# Patient Record
Sex: FEMALE | NOT HISPANIC OR LATINO | Employment: UNEMPLOYED | URBAN - METROPOLITAN AREA
[De-identification: names, ages, dates, MRNs, and addresses within clinical notes are randomized per-mention and may not be internally consistent; named-entity substitution may affect disease eponyms.]

---

## 2017-02-27 ENCOUNTER — HOSPITAL ENCOUNTER (OUTPATIENT)
Dept: ULTRASOUND IMAGING | Facility: CLINIC | Age: 27
Discharge: HOME | End: 2017-02-27
Attending: OBSTETRICS & GYNECOLOGY | Admitting: OBSTETRICS & GYNECOLOGY
Payer: MEDICAID

## 2017-02-27 ENCOUNTER — PRENATAL OFFICE VISIT (OUTPATIENT)
Dept: OBGYN | Facility: CLINIC | Age: 27
End: 2017-02-27
Payer: MEDICAID

## 2017-02-27 VITALS
HEIGHT: 61 IN | SYSTOLIC BLOOD PRESSURE: 93 MMHG | TEMPERATURE: 97.9 F | DIASTOLIC BLOOD PRESSURE: 59 MMHG | WEIGHT: 123 LBS | HEART RATE: 83 BPM | BODY MASS INDEX: 23.22 KG/M2

## 2017-02-27 DIAGNOSIS — Z34.02 PRENATAL CARE, FIRST PREGNANCY, SECOND TRIMESTER: Primary | ICD-10-CM

## 2017-02-27 DIAGNOSIS — Z34.02 PRENATAL CARE, FIRST PREGNANCY, SECOND TRIMESTER: ICD-10-CM

## 2017-02-27 PROBLEM — Z60.3 LANGUAGE BARRIER: Status: ACTIVE | Noted: 2017-02-27

## 2017-02-27 PROBLEM — Z75.8 LANGUAGE BARRIER: Status: ACTIVE | Noted: 2017-02-27

## 2017-02-27 PROBLEM — Z34.00 PRENATAL CARE, FIRST PREGNANCY: Status: ACTIVE | Noted: 2017-02-27

## 2017-02-27 LAB
ABO + RH BLD: NORMAL
ABO + RH BLD: NORMAL
ALBUMIN UR-MCNC: NEGATIVE MG/DL
AMORPH CRY #/AREA URNS HPF: ABNORMAL /HPF
APPEARANCE UR: NORMAL
BILIRUB UR QL STRIP: NEGATIVE
BLD GP AB SCN SERPL QL: NORMAL
BLOOD BANK CMNT PATIENT-IMP: NORMAL
COLOR UR AUTO: YELLOW
ERYTHROCYTE [DISTWIDTH] IN BLOOD BY AUTOMATED COUNT: 13.1 % (ref 10–15)
GLUCOSE UR STRIP-MCNC: NEGATIVE MG/DL
HCT VFR BLD AUTO: 36.8 % (ref 35–47)
HGB BLD-MCNC: 12.4 G/DL (ref 11.7–15.7)
HGB UR QL STRIP: NEGATIVE
KETONES UR STRIP-MCNC: NEGATIVE MG/DL
LEUKOCYTE ESTERASE UR QL STRIP: NEGATIVE
MCH RBC QN AUTO: 29.9 PG (ref 26.5–33)
MCHC RBC AUTO-ENTMCNC: 33.7 G/DL (ref 31.5–36.5)
MCV RBC AUTO: 89 FL (ref 78–100)
NITRATE UR QL: NEGATIVE
NON-SQ EPI CELLS #/AREA URNS LPF: ABNORMAL /LPF
PH UR STRIP: 7 PH (ref 5–7)
PLATELET # BLD AUTO: 214 10E9/L (ref 150–450)
RBC # BLD AUTO: 4.15 10E12/L (ref 3.8–5.2)
RBC #/AREA URNS AUTO: ABNORMAL /HPF (ref 0–2)
SP GR UR STRIP: 1.02 (ref 1–1.03)
SPECIMEN EXP DATE BLD: NORMAL
URN SPEC COLLECT METH UR: NORMAL
UROBILINOGEN UR STRIP-ACNC: 0.2 EU/DL (ref 0.2–1)
WBC # BLD AUTO: 8.1 10E9/L (ref 4–11)
WBC #/AREA URNS AUTO: ABNORMAL /HPF (ref 0–2)

## 2017-02-27 PROCEDURE — 87389 HIV-1 AG W/HIV-1&-2 AB AG IA: CPT | Performed by: OBSTETRICS & GYNECOLOGY

## 2017-02-27 PROCEDURE — 86780 TREPONEMA PALLIDUM: CPT | Performed by: OBSTETRICS & GYNECOLOGY

## 2017-02-27 PROCEDURE — 86900 BLOOD TYPING SEROLOGIC ABO: CPT | Performed by: OBSTETRICS & GYNECOLOGY

## 2017-02-27 PROCEDURE — 76805 OB US >/= 14 WKS SNGL FETUS: CPT

## 2017-02-27 PROCEDURE — 36415 COLL VENOUS BLD VENIPUNCTURE: CPT | Performed by: OBSTETRICS & GYNECOLOGY

## 2017-02-27 PROCEDURE — 87340 HEPATITIS B SURFACE AG IA: CPT | Performed by: OBSTETRICS & GYNECOLOGY

## 2017-02-27 PROCEDURE — 85027 COMPLETE CBC AUTOMATED: CPT | Performed by: OBSTETRICS & GYNECOLOGY

## 2017-02-27 PROCEDURE — 81001 URINALYSIS AUTO W/SCOPE: CPT | Performed by: OBSTETRICS & GYNECOLOGY

## 2017-02-27 PROCEDURE — 99202 OFFICE O/P NEW SF 15 MIN: CPT | Performed by: OBSTETRICS & GYNECOLOGY

## 2017-02-27 PROCEDURE — 86901 BLOOD TYPING SEROLOGIC RH(D): CPT | Performed by: OBSTETRICS & GYNECOLOGY

## 2017-02-27 PROCEDURE — 87491 CHLMYD TRACH DNA AMP PROBE: CPT | Performed by: OBSTETRICS & GYNECOLOGY

## 2017-02-27 PROCEDURE — 87591 N.GONORRHOEAE DNA AMP PROB: CPT | Performed by: OBSTETRICS & GYNECOLOGY

## 2017-02-27 PROCEDURE — 87086 URINE CULTURE/COLONY COUNT: CPT | Performed by: OBSTETRICS & GYNECOLOGY

## 2017-02-27 PROCEDURE — T1013 SIGN LANG/ORAL INTERPRETER: HCPCS | Mod: U3 | Performed by: OBSTETRICS & GYNECOLOGY

## 2017-02-27 PROCEDURE — 86850 RBC ANTIBODY SCREEN: CPT | Performed by: OBSTETRICS & GYNECOLOGY

## 2017-02-27 PROCEDURE — 86762 RUBELLA ANTIBODY: CPT | Performed by: OBSTETRICS & GYNECOLOGY

## 2017-02-27 PROCEDURE — G0145 SCR C/V CYTO,THINLAYER,RESCR: HCPCS | Performed by: OBSTETRICS & GYNECOLOGY

## 2017-02-27 NOTE — PROGRESS NOTES
"Mona is a 26 year old  @ 20.4 weeks here for new ob visit.  Had single U/S in [place] whebn she visited her sister, but no other significant prenatal care.  Estimated Date of Delivery: 2017 by LMP c/w 7.4 week U/S (copy available).      ROS: Ten point review of systems was reviewed and negative except the above.  Current Issues include: fatigue    OBhx: never pregnant  Gyne: Pap smears Normal  history of STD No STD history  Past Medical History   Diagnosis Date     NO ACTIVE PROBLEMS      Past Surgical History   Procedure Laterality Date     No history of surgery       Patient Active Problem List    Diagnosis Date Noted     Language barrier 2017     Priority: Medium     Speaks primarily Chinese.  Needs        Prenatal care, first pregnancy 2017     Priority: Medium      No Known Allergies    No current outpatient prescriptions on file prior to visit.  No current facility-administered medications on file prior to visit.     Past Medical History of Father of Baby:   No significant medical history    Physical Exam: BP 93/59 (BP Location: Right arm, Patient Position: Chair, Cuff Size: Adult Regular)  Pulse 83  Temp 97.9  F (36.6  C) (Oral)  Ht 5' 1.25\" (1.556 m)  Wt 123 lb (55.8 kg)  LMP 10/06/2016  Breastfeeding? No  BMI 23.05 kg/m2  General: Well developed, well nourished female  Skin: Normal  HEENT: Normal  Neck: Supple,no adenopathy,thyroid normal  Chest: Clear  Heart: Regular rate, rhythm,No murmur, rub, gallop  Breasts: Not examined   Abdomen: Benign,Soft, flat, non-tender,No masses, organomegaly,No inguinal nodes,Bowel sounds normoactive   Extremities: Normal  Neurological: Normal   Perineum: Normal   Vulva: Normal  Vagina: Normal mucosa, no discharge  Cervix: Nulliparous, closed, mobile,  no discharge  Uterus: @ Umbilicus  Adnexa: Not palpable  Rectum: deferred, Normal without lesion or mass   Bony Pelvis: Adequate     A/P 26 year old  at  20.4 weeks by LMP c/w 7 week " U/s    1. Discussed physician coverage, tertiary support, diet, exercise, weight gain, schedule of visits, routine and indicated ultrasounds, and childbirth education.    2. Options for  testing for chromosomal and birth defects were discussed with the patient including nuchal lucency/blood marker testing in the first trimester and quad screening and/or Level 2 ultrasound in the second trimester.  We discussed that these are screening tests and not diagnostic tests and that false positives and negatives are a distinct possibility.  We discussed that follow up diagnostic testing would include chorionic villus sampling or amniocentesis depending on gestational age.    3. Prenatal labs, pap, GC, Chlamydia    4. Prenatal Vitamins    5. Late prenatal care    Guadalupe Petersen M.D.

## 2017-02-27 NOTE — LETTER
[facility]  [address] [address]  [phone]      March 2, 2017    Mona Torres  [address] [address]          Dear Mona,    I am happy to inform you that your recent cervical cancer screening test (PAP smear) was normal.      Preventative screening such as this helps insure your health for years to come.  This test should be repeated in 3 years unless otherwise directed.    You will still need to return to the clinic every year for your annual exam and other preventive tests.    Please contact the clinic if you have further questions.      Sincerely,    Guadalupe Petersen MD/kt

## 2017-02-27 NOTE — NURSING NOTE
"Chief Complaint   Patient presents with     Prenatal Care     needs to get 1st ob lab drawn and 20 week ultrasound. Had early ultrasound 11/28/16 showing edc 7/14/17       Initial BP 93/59 (BP Location: Right arm, Patient Position: Chair, Cuff Size: Adult Regular)  Pulse 83  Temp 97.9  F (36.6  C) (Oral)  Ht 5' 1.25\" (1.556 m)  Wt 123 lb (55.8 kg)  LMP 10/06/2016  Breastfeeding? No  BMI 23.05 kg/m2 Estimated body mass index is 23.05 kg/(m^2) as calculated from the following:    Height as of this encounter: 5' 1.25\" (1.556 m).    Weight as of this encounter: 123 lb (55.8 kg).  Medication Reconciliation: complete   Ifrah Zuniga, FABIOLA      "

## 2017-02-27 NOTE — MR AVS SNAPSHOT
After Visit Summary   2/27/2017    Mona Torres    MRN: [id number]           Patient Information     Date Of Birth          1990        Visit Information        Provider Department      2/27/2017 9:15 AM Guadalupe Petersen MD; [employer] [employer]; [facility]        Today's Diagnoses     Prenatal care, first pregnancy, second trimester    -  1       Follow-ups after your visit        Your next 10 appointments already scheduled     Mar 28, 2017  2:45 PM CDT   LAB with [place] [facility]   [facility]    [address] [address]   [phone]           Patient must bring picture ID.  Patient should be prepared to give a urine specimen  Please do not eat 10-12 hours before your appointment if you are coming in fasting for labs on lipids, cholesterol, or glucose (sugar).  Pregnant women should follow their Care Team instructions. Water with medications is okay. Do not drink coffee or other fluids.   If you have concerns about taking  your medications, please ask at office or if scheduling via [employer], send a message by clicking on Secure Messaging, Message Your Care Team.            Mar 28, 2017  3:00 PM CDT   ESTABLISHED PRENATAL with Guadalupe Petersen MD   [facility]    [address] [address]   [phone]              Future tests that were ordered for you today     Open Future Orders        Priority Expected Expires Ordered    Glucose tolerance gest screen 1 hour Routine  7/27/2017 2/27/2017    OB hemoglobin Routine  7/27/2017 2/27/2017     OB > 14 Weeks Complete Single Routine  2/27/2018 2/27/2017            Who to contact     If you have questions or need follow up information about today's clinic visit or your schedule please contact [facility] directly at [phone].  Normal or non-critical lab and imaging results will be  "communicated to you by MyChart, letter or phone within 4 business days after the clinic has received the results. If you do not hear from us within 7 days, please contact the clinic through [employer] or phone. If you have a critical or abnormal lab result, we will notify you by phone as soon as possible.  Submit refill requests through [employer] or call your pharmacy and they will forward the refill request to us. Please allow 3 business days for your refill to be completed.          Additional Information About Your Visit        [employer] Information     [employer] lets you send messages to your doctor, view your test results, renew your prescriptions, schedule appointments and more. To sign up, go to [email or web address] . Click on \"Log in\" on the left side of the screen, which will take you to the Welcome page. Then click on \"Sign up Now\" on the right side of the page.     You will be asked to enter the access code listed below, as well as some personal information. Please follow the directions to create your username and password.     Your access code is: [email or web address]  Expires: 2017 10:59 AM     Your access code will  in 90 days. If you need help or a new code, please call your [facility] clinic or [phone].        Care EveryWhere ID     This is your Care EveryWhere ID. This could be used by other organizations to access your [facility] medical records  [id number]        Your Vitals Were     Pulse Temperature Height Last Period Breastfeeding? BMI (Body Mass Index)    83 97.9  F (36.6  C) (Oral) 5' 1.25\" (1.556 m) 10/06/2016 No 23.05 kg/m2       Blood Pressure from Last 3 Encounters:   17 93/59    Weight from Last 3 Encounters:   17 123 lb (55.8 kg)              We Performed the Following     *UA reflex to Microscopic     ABO/Rh type and screen     Anti Treponema     CBC with platelets     Chlamydia trachomatis PCR     Hepatitis B surface antigen     HIV Antigen Antibody Combo     Neisseria " gonorrhoeae PCR     Pap imaged thin layer screen reflex to HPV if ASCUS - recommend age 25 - 29     Rubella Antibody IgG Quantitative     Urine Culture Aerobic Bacterial        Primary Care Provider    None Specified       No primary provider on file.        Thank you!     Thank you for choosing [facility]  for your care. Our goal is always to provide you with excellent care. Hearing back from our patients is one way we can continue to improve our services. Please take a few minutes to complete the written survey that you may receive in the mail after your visit with us. Thank you!             Your Updated Medication List - Protect others around you: Learn how to safely use, store and throw away your medicines at www.disposemymeds.org.          This list is accurate as of: 2/27/17 10:59 AM.  Always use your most recent med list.                   Brand Name Dispense Instructions for use    FOLIC ACID PO      Take 1 mg by mouth daily

## 2017-02-28 LAB
C TRACH DNA SPEC QL NAA+PROBE: NORMAL
HBV SURFACE AG SERPL QL IA: NONREACTIVE
HIV 1+2 AB+HIV1 P24 AG SERPL QL IA: NORMAL
N GONORRHOEA DNA SPEC QL NAA+PROBE: NORMAL
RUBV IGG SERPL IA-ACNC: 29 IU/ML
SPECIMEN SOURCE: NORMAL
SPECIMEN SOURCE: NORMAL
T PALLIDUM IGG+IGM SER QL: NEGATIVE

## 2017-03-01 LAB
BACTERIA SPEC CULT: ABNORMAL
COPATH REPORT: NORMAL
MICRO REPORT STATUS: ABNORMAL
PAP: NORMAL
SPECIMEN SOURCE: ABNORMAL

## 2017-03-28 ENCOUNTER — PRENATAL OFFICE VISIT (OUTPATIENT)
Dept: OBGYN | Facility: CLINIC | Age: 27
End: 2017-03-28
Payer: MEDICAID

## 2017-03-28 VITALS
SYSTOLIC BLOOD PRESSURE: 99 MMHG | DIASTOLIC BLOOD PRESSURE: 56 MMHG | BODY MASS INDEX: 24.47 KG/M2 | HEART RATE: 81 BPM | HEIGHT: 61 IN | WEIGHT: 129.6 LBS

## 2017-03-28 DIAGNOSIS — Z34.02 PRENATAL CARE, FIRST PREGNANCY, SECOND TRIMESTER: ICD-10-CM

## 2017-03-28 DIAGNOSIS — Z34.02 PRENATAL CARE, FIRST PREGNANCY, SECOND TRIMESTER: Primary | ICD-10-CM

## 2017-03-28 LAB
GLUCOSE 1H P 50 G GLC PO SERPL-MCNC: 79 MG/DL (ref 60–129)
HGB BLD-MCNC: 12.8 G/DL (ref 11.7–15.7)

## 2017-03-28 PROCEDURE — 82950 GLUCOSE TEST: CPT | Performed by: OBSTETRICS & GYNECOLOGY

## 2017-03-28 PROCEDURE — 36415 COLL VENOUS BLD VENIPUNCTURE: CPT | Performed by: OBSTETRICS & GYNECOLOGY

## 2017-03-28 PROCEDURE — 00000218 ZZHCL STATISTIC OBHBG - HEMOGLOBIN: Performed by: OBSTETRICS & GYNECOLOGY

## 2017-03-28 PROCEDURE — 99212 OFFICE O/P EST SF 10 MIN: CPT | Performed by: OBSTETRICS & GYNECOLOGY

## 2017-03-28 NOTE — NURSING NOTE
"Chief Complaint   Patient presents with     Prenatal Care       Initial BP 99/56 (BP Location: Right arm, Patient Position: Chair, Cuff Size: Adult Regular)  Pulse 81  Ht 5' 1.25\" (1.556 m)  Wt 129 lb 9.6 oz (58.8 kg)  LMP 10/06/2016  Breastfeeding? No  BMI 24.29 kg/m2 Estimated body mass index is 24.29 kg/(m^2) as calculated from the following:    Height as of this encounter: 5' 1.25\" (1.556 m).    Weight as of this encounter: 129 lb 9.6 oz (58.8 kg).  Medication Reconciliation: complete   Ifrah Zuniga CMA      "

## 2017-03-28 NOTE — MR AVS SNAPSHOT
"              After Visit Summary   3/28/2017    Mona Torres    MRN: [id number]           Patient Information     Date Of Birth          1990        Visit Information        Provider Department      3/28/2017 3:00 PM Kalyan Pena; Guadalupe Petersen MD [facility]        Today's Diagnoses     Prenatal care, first pregnancy, second trimester    -  1       Follow-ups after your visit        Your next 10 appointments already scheduled     Apr 24, 2017 10:00 AM CDT   ESTABLISHED PRENATAL with Guadalupe Petersen MD   [facility]    [address] [address]   [phone]            May 23, 2017 11:15 AM CDT   ESTABLISHED PRENATAL with Guadalupe Petersen MD   [facility]    [address] [address]   [phone]              Who to contact     If you have questions or need follow up information about today's clinic visit or your schedule please contact [facility] directly at [phone].  Normal or non-critical lab and imaging results will be communicated to you by [employer], letter or phone within 4 business days after the clinic has received the results. If you do not hear from us within 7 days, please contact the clinic through [employer] or phone. If you have a critical or abnormal lab result, we will notify you by phone as soon as possible.  Submit refill requests through [employer] or call your pharmacy and they will forward the refill request to us. Please allow 3 business days for your refill to be completed.          Additional Information About Your Visit        [employer] Information     [employer] lets you send messages to your doctor, view your test results, renew your prescriptions, schedule appointments and more. To sign up, go to [email or web address] . Click on \"Log in\" on the left side of the screen, which will take you to the Welcome page. Then click on \"Sign up Now\" " "on the right side of the page.     You will be asked to enter the access code listed below, as well as some personal information. Please follow the directions to create your username and password.     Your access code is: [email or web address]  Expires: 2017 11:59 AM     Your access code will  in 90 days. If you need help or a new code, please call your [place] clinic or [phone].        Care EveryWhere ID     This is your Care EveryWhere ID. This could be used by other organizations to access your [place] medical records  [id number]        Your Vitals Were     Pulse Height Last Period Breastfeeding? BMI (Body Mass Index)       81 5' 1.25\" (1.556 m) 10/06/2016 No 24.29 kg/m2        Blood Pressure from Last 3 Encounters:   17 99/56   17 93/59    Weight from Last 3 Encounters:   17 129 lb 9.6 oz (58.8 kg)   17 123 lb (55.8 kg)              Today, you had the following     No orders found for display       Primary Care Provider    None Specified       No primary provider on file.        Thank you!     Thank you for choosing [facility]  for your care. Our goal is always to provide you with excellent care. Hearing back from our patients is one way we can continue to improve our services. Please take a few minutes to complete the written survey that you may receive in the mail after your visit with us. Thank you!             Your Updated Medication List - Protect others around you: Learn how to safely use, store and throw away your medicines at www.disposemymeds.org.          This list is accurate as of: 3/28/17  3:28 PM.  Always use your most recent med list.                   Brand Name Dispense Instructions for use    FOLIC ACID PO      Take 1 mg by mouth daily         "

## 2017-03-28 NOTE — PROGRESS NOTES
"CC: Here for routine prenatal visit @ 24w5d   HPI: + FM, no ctx, no LOF, no VB.  No complaints.     PE: BP 99/56 (BP Location: Right arm, Patient Position: Chair, Cuff Size: Adult Regular)  Pulse 81  Ht 5' 1.25\" (1.556 m)  Wt 129 lb 9.6 oz (58.8 kg)  LMP 10/06/2016  Breastfeeding? No  BMI 24.29 kg/m2   See OB flowsheet      U/S: WNL    A/P G1 @ 24w5d normal pregnancy    1. Routine prenatal care.  Reviewed aches/pains of pregnancy.  Reviewed interrupted sleep.  Discussed consideration for unisom or benadryl.  Declined.      RTC 4 weeks.      Guadalupe Petersen M.D.    "

## 2017-03-29 NOTE — PROGRESS NOTES
Will forward to [facility] pool for pt notification of normal result.    Renee Day   [facility]  RN

## 2017-04-11 ENCOUNTER — TELEPHONE (OUTPATIENT)
Dept: OBGYN | Facility: CLINIC | Age: 27
End: 2017-04-11

## 2017-04-11 NOTE — TELEPHONE ENCOUNTER
"Reason for call:  Patient reporting a symptom (someone on the phone with her interpreting her request)    Symptom or request: She needs to \"prove pregnancy, how many weeks\"  Requesting letter from MD - stating she was in for appt, she is pregnant and EDC.  States her  needs this.    Requesting a call when ready and she will   (Name pronounced  \"Lyn-Ah\")    Duration (how long have symptoms been present):     Have you been treated for this before? Yes    Additional comments: letter typed and given to Dr. Petersen to sign.    Phone Number patient can be reached at:  Home number on file [phone] (home)    Best Time:      Can we leave a detailed message on this number:  YES    Call taken on 4/11/2017 at 1:15 PM by Rivka Egan    "

## 2017-04-11 NOTE — TELEPHONE ENCOUNTER
Pt informed letter at  for her to .    -Rivka HAMPTON [facility]  [facility] [place]

## 2017-04-11 NOTE — LETTER
[facility]  [address] [place] [address]  [phone]    2017      RE:Mona Torres                                                                                                                                [address] [address]            To Whom It May Concern:      Mona Torres ( 1990) is a patient here at [facility].  She is currently pregnant with an estimated delivery date of 17.      Sincerely,      Guadalupe Petersen MD

## 2017-04-24 ENCOUNTER — PRENATAL OFFICE VISIT (OUTPATIENT)
Dept: OBGYN | Facility: CLINIC | Age: 27
End: 2017-04-24
Payer: COMMERCIAL

## 2017-04-24 VITALS
HEIGHT: 61 IN | HEART RATE: 85 BPM | WEIGHT: 136 LBS | SYSTOLIC BLOOD PRESSURE: 88 MMHG | BODY MASS INDEX: 25.68 KG/M2 | DIASTOLIC BLOOD PRESSURE: 52 MMHG

## 2017-04-24 DIAGNOSIS — Z34.02 PRENATAL CARE, FIRST PREGNANCY, SECOND TRIMESTER: ICD-10-CM

## 2017-04-24 PROCEDURE — 90715 TDAP VACCINE 7 YRS/> IM: CPT | Performed by: OBSTETRICS & GYNECOLOGY

## 2017-04-24 PROCEDURE — 99212 OFFICE O/P EST SF 10 MIN: CPT | Mod: 25 | Performed by: OBSTETRICS & GYNECOLOGY

## 2017-04-24 PROCEDURE — T1013 SIGN LANG/ORAL INTERPRETER: HCPCS | Mod: U3 | Performed by: OBSTETRICS & GYNECOLOGY

## 2017-04-24 PROCEDURE — 90471 IMMUNIZATION ADMIN: CPT | Performed by: OBSTETRICS & GYNECOLOGY

## 2017-04-24 NOTE — NURSING NOTE
"Chief Complaint   Patient presents with     Prenatal Care     go over diet        Initial BP (!) 88/52 (BP Location: Right arm, Patient Position: Chair, Cuff Size: Adult Regular)  Pulse 85  Ht 1.556 m (5' 1.25\")  Wt 61.7 kg (136 lb)  LMP 10/06/2016  BMI 25.49 kg/m2 Estimated body mass index is 25.49 kg/(m^2) as calculated from the following:    Height as of this encounter: 1.556 m (5' 1.25\").    Weight as of this encounter: 61.7 kg (136 lb).  Medication Reconciliation: complete     Joan Mercer CMA     "

## 2017-04-24 NOTE — PROGRESS NOTES
"CC: Here for routine prenatal visit @ 28w4d   HPI: + FM, no ctx, no LOF, no VB.  No complaints.     PE: BP (!) 88/52 (BP Location: Right arm, Patient Position: Chair, Cuff Size: Adult Regular)  Pulse 85  Ht 1.556 m (5' 1.25\")  Wt 61.7 kg (136 lb)  LMP 10/06/2016  BMI 25.49 kg/m2   See OB flowsheet    Labs WNL    A/P G1 @ 28w4d normal pregnancy    1. Routine prenatal care.  Tdap given today for prophylaxis.    RTC 2-4 weeks.      Guadalupe Petersen M.D.    "

## 2017-04-24 NOTE — MR AVS SNAPSHOT
"              After Visit Summary   4/24/2017    Mona Torres    MRN: [id number]           Patient Information     Date Of Birth          1990        Visit Information        Provider Department      4/24/2017 9:45 AM Robby Brooks Julie Lin, MD [facility]        Today's Diagnoses     Prenatal care, first pregnancy, second trimester           Follow-ups after your visit        Your next 10 appointments already scheduled     May 23, 2017 11:15 AM CDT   ESTABLISHED PRENATAL with Guadalupe Petersen MD   [facility]    [address] [address]   [phone]              Who to contact     If you have questions or need follow up information about today's clinic visit or your schedule please contact [facility] directly at [phone].  Normal or non-critical lab and imaging results will be communicated to you by MyChart, letter or phone within 4 business days after the clinic has received the results. If you do not hear from us within 7 days, please contact the clinic through MyChart or phone. If you have a critical or abnormal lab result, we will notify you by phone as soon as possible.  Submit refill requests through [employer] or call your pharmacy and they will forward the refill request to us. Please allow 3 business days for your refill to be completed.          Additional Information About Your Visit        MyChart Information     [employer] lets you send messages to your doctor, view your test results, renew your prescriptions, schedule appointments and more. To sign up, go to [email or web address] . Click on \"Log in\" on the left side of the screen, which will take you to the Welcome page. Then click on \"Sign up Now\" on the right side of the page.     You will be asked to enter the access code listed below, as well as some personal information. Please follow the directions to create your username and password.   " "  Your access code is: [email or web address]  Expires: 2017 11:59 AM     Your access code will  in 90 days. If you need help or a new code, please call your [facility] or [phone].        Care EveryWhere ID     This is your Care EveryWhere ID. This could be used by other organizations to access your [facility] medical records  [id number]        Your Vitals Were     Pulse Height Last Period BMI (Body Mass Index)          85 5' 1.25\" (1.556 m) 10/06/2016 25.49 kg/m2         Blood Pressure from Last 3 Encounters:   17 (!) 88/52   17 99/56   17 93/59    Weight from Last 3 Encounters:   17 136 lb (61.7 kg)   17 129 lb 9.6 oz (58.8 kg)   17 123 lb (55.8 kg)              We Performed the Following     TDAP VACCINE (ADACEL)     VACCINE ADMINISTRATION, INITIAL        Primary Care Provider    None Specified       No primary provider on file.        Thank you!     Thank you for choosing [facility]  for your care. Our goal is always to provide you with excellent care. Hearing back from our patients is one way we can continue to improve our services. Please take a few minutes to complete the written survey that you may receive in the mail after your visit with us. Thank you!             Your Updated Medication List - Protect others around you: Learn how to safely use, store and throw away your medicines at www.disposemymeds.org.          This list is accurate as of: 17 10:27 AM.  Always use your most recent med list.                   Brand Name Dispense Instructions for use    FOLIC ACID PO      Take 1 mg by mouth daily         "

## 2017-05-23 ENCOUNTER — PRENATAL OFFICE VISIT (OUTPATIENT)
Dept: OBGYN | Facility: CLINIC | Age: 27
End: 2017-05-23
Payer: COMMERCIAL

## 2017-05-23 VITALS
DIASTOLIC BLOOD PRESSURE: 69 MMHG | WEIGHT: 147.2 LBS | HEART RATE: 96 BPM | BODY MASS INDEX: 27.59 KG/M2 | SYSTOLIC BLOOD PRESSURE: 113 MMHG | RESPIRATION RATE: 16 BRPM

## 2017-05-23 DIAGNOSIS — Z34.02 PRENATAL CARE, FIRST PREGNANCY, SECOND TRIMESTER: ICD-10-CM

## 2017-05-23 PROCEDURE — 99207 ZZC PRENATAL VISIT: CPT | Performed by: OBSTETRICS & GYNECOLOGY

## 2017-05-23 NOTE — PROGRESS NOTES
CC: Here for routine prenatal visit @ 32w5d   HPI: + FM, no ctx, no LOF, no VB.  Having some pelvic discomfort.     PE: /69  Pulse 96  Resp 16  Wt 147 lb 3.2 oz (66.8 kg)  LMP 10/06/2016  BMI 27.59 kg/m2   See OB flowsheet    A/P G1 @ 32w5d normal pregnancy    1. Routine prenatal care.  Discussed aches/pains of pregnancy.  Pregnancy stretches given to patient.     RTC 2 weeks.      Guadalupe Petersen M.D.

## 2017-05-23 NOTE — MR AVS SNAPSHOT
After Visit Summary   5/23/2017    Mona Torres    MRN: [id number]           Patient Information     Date Of Birth          1990        Visit Information        Provider Department      5/23/2017 11:00 AM Guadalupe Petersen MD; [facility]        Today's Diagnoses     Prenatal care, first pregnancy, second trimester           Follow-ups after your visit        Your next 10 appointments already scheduled     Jun 05, 2017 10:30 AM CDT   ESTABLISHED PRENATAL with Guadalupe Petersen MD   [facility]    [address] [address]   [phone]            Jun 22, 2017  9:30 AM CDT   ESTABLISHED PRENATAL with Guadalupe Petersen MD   [facility]    [address] [address]   [phone]            Jun 27, 2017 10:00 AM CDT   ESTABLISHED PRENATAL with Guadalupe Petersen MD   [facility]    [address] [address]   [phone]            Jul 03, 2017 10:00 AM CDT   ESTABLISHED PRENATAL with Guadalupe Petersen MD   [facility]    [address] [address]   [phone]            Jul 10, 2017  9:15 AM CDT   ESTABLISHED PRENATAL with Guadalupe Petersen MD   [facility]    [address] [address]   [phone]            Jul 18, 2017 10:30 AM CDT   ESTABLISHED PRENATAL with Guadalupe Petersen MD   [facility]    [address] [address]   [phone]              Who to contact     If you have questions or need follow up information about today's clinic visit or your schedule please contact [facility] directly at [phone].  Normal or non-critical lab and imaging results will be communicated to you by  "MyChart, letter or phone within 4 business days after the clinic has received the results. If you do not hear from us within 7 days, please contact the clinic through Pol[employer] or phone. If you have a critical or abnormal lab result, we will notify you by phone as soon as possible.  Submit refill requests through Tellme or call your pharmacy and they will forward the refill request to us. Please allow 3 business days for your refill to be completed.          Additional Information About Your Visit        [facility] Information     Tellme lets you send messages to your doctor, view your test results, renew your prescriptions, schedule appointments and more. To sign up, go to [email or web address] . Click on \"Log in\" on the left side of the screen, which will take you to the Welcome page. Then click on \"Sign up Now\" on the right side of the page.     You will be asked to enter the access code listed below, as well as some personal information. Please follow the directions to create your username and password.     Your access code is: [email or web address]  Expires: 2017 11:59 AM     Your access code will  in 90 days. If you need help or a new code, please call your [place] clinic or [phone].        Care EveryWhere ID     This is your Care EveryWhere ID. This could be used by other organizations to access your [place] medical records  [id number]        Your Vitals Were     Pulse Respirations Last Period BMI (Body Mass Index)          96 16 10/06/2016 27.59 kg/m2         Blood Pressure from Last 3 Encounters:   17 113/69   17 (!) 88/52   17 99/56    Weight from Last 3 Encounters:   17 147 lb 3.2 oz (66.8 kg)   17 136 lb (61.7 kg)   17 129 lb 9.6 oz (58.8 kg)              Today, you had the following     No orders found for display       Primary Care Provider    None Specified       No primary provider on file.        Thank you!     Thank you for choosing [facility] [place]  for your care. Our goal is always to provide you with excellent care. Hearing back from our patients is one way we can continue to improve our services. Please take a few minutes to complete the written survey that you may receive in the mail after your visit with us. Thank you!             Your Updated Medication List - Protect others around you: Learn how to safely use, store and throw away your medicines at www.disposemymeds.org.          This list is accurate as of: 5/23/17 12:37 PM.  Always use your most recent med list.                   Brand Name Dispense Instructions for use    FOLIC ACID PO      Take 1 mg by mouth daily

## 2017-05-23 NOTE — NURSING NOTE
"Chief Complaint   Patient presents with     Prenatal Care     If she sits for a long time she has been getting pain in the low abdomen and in her back.       Initial /69  Pulse 96  Resp 16  Wt 147 lb 3.2 oz (66.8 kg)  LMP 10/06/2016  BMI 27.59 kg/m2 Estimated body mass index is 27.59 kg/(m^2) as calculated from the following:    Height as of 4/24/17: 5' 1.25\" (1.556 m).    Weight as of this encounter: 147 lb 3.2 oz (66.8 kg).  Medication Reconciliation: complete    "

## 2017-06-05 ENCOUNTER — PRENATAL OFFICE VISIT (OUTPATIENT)
Dept: OBGYN | Facility: CLINIC | Age: 27
End: 2017-06-05
Payer: COMMERCIAL

## 2017-06-05 VITALS
BODY MASS INDEX: 27.75 KG/M2 | WEIGHT: 147 LBS | HEART RATE: 93 BPM | HEIGHT: 61 IN | DIASTOLIC BLOOD PRESSURE: 63 MMHG | SYSTOLIC BLOOD PRESSURE: 103 MMHG

## 2017-06-05 DIAGNOSIS — Z34.03 PRENATAL CARE, FIRST PREGNANCY, THIRD TRIMESTER: ICD-10-CM

## 2017-06-05 PROCEDURE — 99207 ZZC PRENATAL VISIT: CPT | Performed by: OBSTETRICS & GYNECOLOGY

## 2017-06-05 RX ORDER — PRENATAL VIT/IRON FUM/FOLIC AC 27MG-0.8MG
1 TABLET ORAL DAILY
COMMUNITY
End: 2018-08-28

## 2017-06-05 NOTE — NURSING NOTE
"Initial /63 (BP Location: Left arm, Patient Position: Chair, Cuff Size: Adult Small)  Pulse 93  Ht 5' 1.25\" (1.556 m)  Wt 147 lb (66.7 kg)  LMP 10/06/2016  BMI 27.55 kg/m2 Estimated body mass index is 27.55 kg/(m^2) as calculated from the following:    Height as of this encounter: 5' 1.25\" (1.556 m).    Weight as of this encounter: 147 lb (66.7 kg). .      "

## 2017-06-05 NOTE — PROGRESS NOTES
"CC: Here for routine prenatal visit @ 34w4d   HPI: + FM, no ctx, no LOF, no VB.  No complaints.     PE: /63 (BP Location: Left arm, Patient Position: Chair, Cuff Size: Adult Small)  Pulse 93  Ht 5' 1.25\" (1.556 m)  Wt 147 lb (66.7 kg)  LMP 10/06/2016  BMI 27.55 kg/m2   See OB flowsheet    A/P G1 @ 34w4d normal pregnancy    1. Routine prenatal care.  Reviewed labor precautions and plans for GBS next visit.     RTC 2 weeks.      Guadalupe Petersen M.D.    "

## 2017-06-22 ENCOUNTER — PRENATAL OFFICE VISIT (OUTPATIENT)
Dept: OBGYN | Facility: CLINIC | Age: 27
End: 2017-06-22
Payer: COMMERCIAL

## 2017-06-22 VITALS
WEIGHT: 150.6 LBS | SYSTOLIC BLOOD PRESSURE: 98 MMHG | HEIGHT: 61 IN | DIASTOLIC BLOOD PRESSURE: 69 MMHG | HEART RATE: 92 BPM | BODY MASS INDEX: 28.43 KG/M2

## 2017-06-22 DIAGNOSIS — Z34.03 PRENATAL CARE, FIRST PREGNANCY, THIRD TRIMESTER: ICD-10-CM

## 2017-06-22 PROCEDURE — 99207 ZZC PRENATAL VISIT: CPT | Performed by: OBSTETRICS & GYNECOLOGY

## 2017-06-22 PROCEDURE — 87653 STREP B DNA AMP PROBE: CPT | Performed by: OBSTETRICS & GYNECOLOGY

## 2017-06-22 NOTE — PROGRESS NOTES
"CC: Here for routine prenatal visit @ 37w0d   HPI: + FM, no ctx, no LOF, no VB.  No complaints.     PE: BP 98/69 (BP Location: Left arm, Cuff Size: Adult Regular)  Pulse 92  Ht 5' 1.25\" (1.556 m)  Wt 150 lb 9.6 oz (68.3 kg)  LMP 10/06/2016  BMI 28.22 kg/m2   See OB flowsheet    GBS done    A/P G1 @ 37w0d normal pregnancy    1. Routine prenatal care.  Reviewed labor precautions    RTC 1 week    Guadalupe Petersen M.D.    "

## 2017-06-22 NOTE — NURSING NOTE
"Initial BP 98/69 (BP Location: Left arm, Cuff Size: Adult Regular)  Pulse 92  Ht 5' 1.25\" (1.556 m)  Wt 150 lb 9.6 oz (68.3 kg)  LMP 10/06/2016  BMI 28.22 kg/m2 Estimated body mass index is 28.22 kg/(m^2) as calculated from the following:    Height as of this encounter: 5' 1.25\" (1.556 m).    Weight as of this encounter: 150 lb 9.6 oz (68.3 kg). .      "

## 2017-06-22 NOTE — MR AVS SNAPSHOT
After Visit Summary   6/22/2017    Mona Torres    MRN: [id number]           Patient Information     Date Of Birth          1990        Visit Information        Provider Department      6/22/2017 9:15 AM Guadalupe Petersen MD; M[facility]IL[facility]        Today's Diagnoses     Prenatal care, first pregnancy, third trimester           Follow-ups after your visit        Your next 10 appointments already scheduled     Jun 27, 2017 10:00 AM CDT   ESTABLISHED PRENATAL with Guadalupe Petersen MD   [facility]    [address] [address]   [phone]            Jul 03, 2017 10:00 AM CDT   ESTABLISHED PRENATAL with Guadalupe Petersen MD   [facility]    [address] [address]   [phone]            Jul 10, 2017  9:15 AM CDT   ESTABLISHED PRENATAL with Guadalupe Petersen MD   [facility]    [address] [address]   [phone]            Jul 18, 2017 10:30 AM CDT   ESTABLISHED PRENATAL with Guadalupe Petersen MD   [facility]    [address] [address]   [phone]              Who to contact     If you have questions or need follow up information about today's clinic visit or your schedule please contact [facility] directly at [phone].  Normal or non-critical lab and imaging results will be communicated to you by MyChart, letter or phone within 4 business days after the clinic has received the results. If you do not hear from us within 7 days, please contact the clinic through FreshOfficehart or phone. If you have a critical or abnormal lab result, we will notify you by phone as soon as possible.  Submit refill requests through [employer] or call your pharmacy and they will forward the refill request to us. Please allow 3  "business days for your refill to be completed.          Additional Information About Your Visit        MyChart Information     [email or web address] lets you send messages to your doctor, view your test results, renew your prescriptions, schedule appointments and more. To sign up, go to [email or web address] . Click on \"Log in\" on the left side of the screen, which will take you to the Welcome page. Then click on \"Sign up Now\" on the right side of the page.     You will be asked to enter the access code listed below, as well as some personal information. Please follow the directions to create your username and password.     Your access code is: [email or web address]  Expires: 9/3/2017 11:04 AM     Your access code will  in 90 days. If you need help or a new code, please call your [place] clinic or [phone].        Care EveryWhere ID     This is your Care EveryWhere ID. This could be used by other organizations to access your [place] medical records  [id number]        Your Vitals Were     Pulse Height Last Period BMI (Body Mass Index)          92 5' 1.25\" (1.556 m) 10/06/2016 28.22 kg/m2         Blood Pressure from Last 3 Encounters:   17 98/69   17 103/63   17 113/69    Weight from Last 3 Encounters:   17 150 lb 9.6 oz (68.3 kg)   17 147 lb (66.7 kg)   17 147 lb 3.2 oz (66.8 kg)              We Performed the Following     Group B strep PCR        Primary Care Provider    None Specified       No primary provider on file.        Equal Access to Services     [facility]: Hadii titus Haas, wastevenda luqadaha, qaybciara winslowalgarcia wayne. So [facility] [phone].    ATENCIÓN: Si habla español, tiene a blankenship disposición servicios gratuitos de asistencia lingüística. Llame al [phone].    We comply with applicable federal civil rights laws and [place] laws. We do not discriminate on the basis of race, color, national origin, age, disability " sex, sexual orientation or gender identity.            Thank you!     Thank you for choosing [facility]  for your care. Our goal is always to provide you with excellent care. Hearing back from our patients is one way we can continue to improve our services. Please take a few minutes to complete the written survey that you may receive in the mail after your visit with us. Thank you!             Your Updated Medication List - Protect others around you: Learn how to safely use, store and throw away your medicines at www.disposemymeds.org.          This list is accurate as of: 6/22/17 10:24 AM.  Always use your most recent med list.                   Brand Name Dispense Instructions for use Diagnosis    FOLIC ACID PO      Take 1 mg by mouth daily        prenatal multivitamin  plus iron 27-0.8 MG Tabs per tablet      Take 1 tablet by mouth daily

## 2017-06-23 LAB
GP B STREP DNA SPEC QL NAA+PROBE: NORMAL
SPECIMEN SOURCE: NORMAL

## 2017-06-26 NOTE — PROGRESS NOTES
Will forward to [facility] pool for pt notification of normal result.    Renee Day   [facility]  RN

## 2017-06-27 ENCOUNTER — PRENATAL OFFICE VISIT (OUTPATIENT)
Dept: OBGYN | Facility: CLINIC | Age: 27
End: 2017-06-27
Payer: COMMERCIAL

## 2017-06-27 VITALS
WEIGHT: 151 LBS | HEART RATE: 91 BPM | DIASTOLIC BLOOD PRESSURE: 65 MMHG | BODY MASS INDEX: 28.51 KG/M2 | HEIGHT: 61 IN | SYSTOLIC BLOOD PRESSURE: 102 MMHG

## 2017-06-27 DIAGNOSIS — Z34.03 PRENATAL CARE, FIRST PREGNANCY, THIRD TRIMESTER: Primary | ICD-10-CM

## 2017-06-27 PROCEDURE — T1013 SIGN LANG/ORAL INTERPRETER: HCPCS | Mod: U3 | Performed by: OBSTETRICS & GYNECOLOGY

## 2017-06-27 PROCEDURE — 99207 ZZC PRENATAL VISIT: CPT | Performed by: OBSTETRICS & GYNECOLOGY

## 2017-06-27 NOTE — NURSING NOTE
"Initial /65 (BP Location: Right arm, Patient Position: Chair, Cuff Size: Adult Small)  Pulse 91  Ht 5' 1.25\" (1.556 m)  Wt 151 lb (68.5 kg)  LMP 10/06/2016  BMI 28.3 kg/m2 Estimated body mass index is 28.3 kg/(m^2) as calculated from the following:    Height as of this encounter: 5' 1.25\" (1.556 m).    Weight as of this encounter: 151 lb (68.5 kg). .      "

## 2017-06-27 NOTE — PROGRESS NOTES
"CC: Here for routine prenatal visit @ 37w5d   HPI: + FM, no ctx, no LOF, no VB.  No complaints.     PE: /65 (BP Location: Right arm, Patient Position: Chair, Cuff Size: Adult Small)  Pulse 91  Ht 5' 1.25\" (1.556 m)  Wt 151 lb (68.5 kg)  LMP 10/06/2016  BMI 28.3 kg/m2   See OB flowsheet    GBS negative    A/P G1 @ 37w5d normal pregnancy    1. Routine prenatal care.  Labor precautions reviewed    RTC 1 weeks.      Guadalupe Petersen M.D.    "

## 2017-06-27 NOTE — MR AVS SNAPSHOT
After Visit Summary   6/27/2017    Mona Torres    MRN: [id number]           Patient Information     Date Of Birth          1990        Visit Information        Provider Department      6/27/2017 10:00 AM Robby Brooks Julie Lin, MD [facility]        Today's Diagnoses     Prenatal care, first pregnancy, third trimester    -  1       Follow-ups after your visit        Your next 10 appointments already scheduled     Jul 03, 2017 10:00 AM CDT   ESTABLISHED PRENATAL with Guadalupe Petersen MD   [facility]    [address] [address]   [phone]            Jul 10, 2017  9:15 AM CDT   ESTABLISHED PRENATAL with Guadalupe Petersen MD   [facility]    [address] [address]   [phone]            Jul 18, 2017 10:30 AM CDT   ESTABLISHED PRENATAL with Guadalupe Petersen MD   [facility]    [address] [address]   [phone]              Who to contact     If you have questions or need follow up information about today's clinic visit or your schedule please contact [facility] directly at [phone].  Normal or non-critical lab and imaging results will be communicated to you by RunAlonghart, letter or phone within 4 business days after the clinic has received the results. If you do not hear from us within 7 days, please contact the clinic through MyChart or phone. If you have a critical or abnormal lab result, we will notify you by phone as soon as possible.  Submit refill requests through Glaukos or call your pharmacy and they will forward the refill request to us. Please allow 3 business days for your refill to be completed.          Additional Information About Your Visit        Glaukos Information     Glaukos lets you send messages to your doctor, view your test results, renew your  "prescriptions, schedule appointments and more. To sign up, go to [email or web address] . Click on \"Log in\" on the left side of the screen, which will take you to the Welcome page. Then click on \"Sign up Now\" on the right side of the page.     You will be asked to enter the access code listed below, as well as some personal information. Please follow the directions to create your username and password.     Your access code is: [email or web address]  Expires: 9/3/2017 11:04 AM     Your access code will  in 90 days. If you need help or a new code, please call your [place] clinic or [phone].        Care EveryWhere ID     This is your Care EveryWhere ID. This could be used by other organizations to access your [place] medical records  [id number]        Your Vitals Were     Pulse Height Last Period BMI (Body Mass Index)          91 5' 1.25\" (1.556 m) 10/06/2016 28.3 kg/m2         Blood Pressure from Last 3 Encounters:   17 102/65   17 98/69   17 103/63    Weight from Last 3 Encounters:   17 151 lb (68.5 kg)   17 150 lb 9.6 oz (68.3 kg)   17 147 lb (66.7 kg)              Today, you had the following     No orders found for display       Primary Care Provider    None Specified       No primary provider on file.        Equal Access to Services     [facility]RE[facility] : Hadii titus Haas, wastevenda john, qaoliverta kaalkana goodman, garcia villegas. So [facility] [phone].    ATENCIÓN: Si habla español, tiene a blankenship disposición servicios gratuitos de asistencia lingüística. Llame al [phone].    We comply with applicable federal civil rights laws and [place] laws. We do not discriminate on the basis of race, color, national origin, age, disability sex, sexual orientation or gender identity.            Thank you!     Thank you for choosing [facility] [place]  for your care. Our goal is always to provide you with excellent care. Hearing back from " our patients is one way we can continue to improve our services. Please take a few minutes to complete the written survey that you may receive in the mail after your visit with us. Thank you!             Your Updated Medication List - Protect others around you: Learn how to safely use, store and throw away your medicines at www.disposemymeds.org.          This list is accurate as of: 6/27/17 10:29 AM.  Always use your most recent med list.                   Brand Name Dispense Instructions for use Diagnosis    FOLIC ACID PO      Take 1 mg by mouth daily        prenatal multivitamin  plus iron 27-0.8 MG Tabs per tablet      Take 1 tablet by mouth daily

## 2017-06-30 ENCOUNTER — TELEPHONE (OUTPATIENT)
Dept: OBGYN | Facility: CLINIC | Age: 27
End: 2017-06-30

## 2017-07-01 NOTE — TELEPHONE ENCOUNTER
D: Mona Torres 1990   38w1d   Patient called due to  fluid leaking .    A: Contractions: none since  .  Fluid leaking: clear   Vaginal Bleeding: none  Fetal movement: unable   Pain:Denies  Headache:unable  R: Instructed patient to Come to [facility] for evaluation.  Difficulty with communication as patient speaks very little english. Patient stated that she didn't want to come in as she was having no pain but she would speak with her . Reinforced need to come in for evaluation.

## 2017-07-03 ENCOUNTER — PRENATAL OFFICE VISIT (OUTPATIENT)
Dept: OBGYN | Facility: CLINIC | Age: 27
End: 2017-07-03
Payer: COMMERCIAL

## 2017-07-03 ENCOUNTER — HOSPITAL ENCOUNTER (INPATIENT)
Facility: CLINIC | Age: 27
LOS: 3 days | Discharge: HOME | End: 2017-07-06
Attending: OBSTETRICS & GYNECOLOGY | Admitting: OBSTETRICS & GYNECOLOGY
Payer: COMMERCIAL

## 2017-07-03 VITALS
HEART RATE: 89 BPM | BODY MASS INDEX: 29.27 KG/M2 | HEIGHT: 61 IN | DIASTOLIC BLOOD PRESSURE: 66 MMHG | WEIGHT: 155 LBS | SYSTOLIC BLOOD PRESSURE: 102 MMHG

## 2017-07-03 DIAGNOSIS — O42.90 LEAKAGE OF AMNIOTIC FLUID: ICD-10-CM

## 2017-07-03 DIAGNOSIS — Z34.03 PRENATAL CARE, FIRST PREGNANCY, THIRD TRIMESTER: Primary | ICD-10-CM

## 2017-07-03 LAB
A1 MICROGLOB PLACENTAL VAG QL: POSITIVE
ABO + RH BLD: NORMAL
ABO + RH BLD: NORMAL
HGB BLD-MCNC: 13.1 G/DL (ref 11.7–15.7)
SPECIMEN EXP DATE BLD: NORMAL

## 2017-07-03 PROCEDURE — 12000031 ZZH R&B OB CRITICAL

## 2017-07-03 PROCEDURE — 25000125 ZZHC RX 250: Performed by: OBSTETRICS & GYNECOLOGY

## 2017-07-03 PROCEDURE — 86900 BLOOD TYPING SEROLOGIC ABO: CPT | Performed by: OBSTETRICS & GYNECOLOGY

## 2017-07-03 PROCEDURE — 84112 EVAL AMNIOTIC FLUID PROTEIN: CPT | Performed by: OBSTETRICS & GYNECOLOGY

## 2017-07-03 PROCEDURE — 10907ZC DRAINAGE OF AMNIOTIC FLUID, THERAPEUTIC FROM PRODUCTS OF CONCEPTION, VIA NATURAL OR ARTIFICIAL OPENING: ICD-10-PCS | Performed by: OBSTETRICS & GYNECOLOGY

## 2017-07-03 PROCEDURE — 99207 ZZC PRENATAL VISIT: CPT | Performed by: OBSTETRICS & GYNECOLOGY

## 2017-07-03 PROCEDURE — 85018 HEMOGLOBIN: CPT | Performed by: OBSTETRICS & GYNECOLOGY

## 2017-07-03 PROCEDURE — 86901 BLOOD TYPING SEROLOGIC RH(D): CPT | Performed by: OBSTETRICS & GYNECOLOGY

## 2017-07-03 PROCEDURE — 86780 TREPONEMA PALLIDUM: CPT | Performed by: OBSTETRICS & GYNECOLOGY

## 2017-07-03 PROCEDURE — 25000128 H RX IP 250 OP 636: Performed by: OBSTETRICS & GYNECOLOGY

## 2017-07-03 PROCEDURE — 59425 ANTEPARTUM CARE ONLY: CPT | Performed by: OBSTETRICS & GYNECOLOGY

## 2017-07-03 PROCEDURE — 36415 COLL VENOUS BLD VENIPUNCTURE: CPT | Performed by: OBSTETRICS & GYNECOLOGY

## 2017-07-03 RX ORDER — OXYCODONE AND ACETAMINOPHEN 5; 325 MG/1; MG/1
1 TABLET ORAL
Status: DISCONTINUED | OUTPATIENT
Start: 2017-07-03 | End: 2017-07-04

## 2017-07-03 RX ORDER — OXYTOCIN/0.9 % SODIUM CHLORIDE 30/500 ML
1-24 PLASTIC BAG, INJECTION (ML) INTRAVENOUS CONTINUOUS
Status: DISCONTINUED | OUTPATIENT
Start: 2017-07-03 | End: 2017-07-04

## 2017-07-03 RX ORDER — ACETAMINOPHEN 325 MG/1
650 TABLET ORAL EVERY 4 HOURS PRN
Status: DISCONTINUED | OUTPATIENT
Start: 2017-07-03 | End: 2017-07-04

## 2017-07-03 RX ORDER — IBUPROFEN 800 MG/1
800 TABLET, FILM COATED ORAL
Status: DISCONTINUED | OUTPATIENT
Start: 2017-07-03 | End: 2017-07-04

## 2017-07-03 RX ORDER — NALOXONE HYDROCHLORIDE 0.4 MG/ML
.1-.4 INJECTION, SOLUTION INTRAMUSCULAR; INTRAVENOUS; SUBCUTANEOUS
Status: DISCONTINUED | OUTPATIENT
Start: 2017-07-03 | End: 2017-07-04

## 2017-07-03 RX ORDER — ONDANSETRON 2 MG/ML
4 INJECTION INTRAMUSCULAR; INTRAVENOUS EVERY 6 HOURS PRN
Status: DISCONTINUED | OUTPATIENT
Start: 2017-07-03 | End: 2017-07-04

## 2017-07-03 RX ORDER — SODIUM CHLORIDE, SODIUM LACTATE, POTASSIUM CHLORIDE, CALCIUM CHLORIDE 600; 310; 30; 20 MG/100ML; MG/100ML; MG/100ML; MG/100ML
INJECTION, SOLUTION INTRAVENOUS CONTINUOUS
Status: DISCONTINUED | OUTPATIENT
Start: 2017-07-03 | End: 2017-07-04

## 2017-07-03 RX ORDER — OXYTOCIN/0.9 % SODIUM CHLORIDE 30/500 ML
100-340 PLASTIC BAG, INJECTION (ML) INTRAVENOUS CONTINUOUS PRN
Status: COMPLETED | OUTPATIENT
Start: 2017-07-03 | End: 2017-07-04

## 2017-07-03 RX ORDER — OXYTOCIN 10 [USP'U]/ML
10 INJECTION, SOLUTION INTRAMUSCULAR; INTRAVENOUS
Status: DISCONTINUED | OUTPATIENT
Start: 2017-07-03 | End: 2017-07-04

## 2017-07-03 RX ORDER — CARBOPROST TROMETHAMINE 250 UG/ML
250 INJECTION, SOLUTION INTRAMUSCULAR
Status: DISCONTINUED | OUTPATIENT
Start: 2017-07-03 | End: 2017-07-04

## 2017-07-03 RX ORDER — FENTANYL CITRATE 50 UG/ML
50-100 INJECTION, SOLUTION INTRAMUSCULAR; INTRAVENOUS
Status: DISCONTINUED | OUTPATIENT
Start: 2017-07-03 | End: 2017-07-04

## 2017-07-03 RX ORDER — LIDOCAINE 40 MG/G
CREAM TOPICAL
Status: DISCONTINUED | OUTPATIENT
Start: 2017-07-03 | End: 2017-07-04

## 2017-07-03 RX ORDER — METHYLERGONOVINE MALEATE 0.2 MG/ML
200 INJECTION INTRAVENOUS
Status: DISCONTINUED | OUTPATIENT
Start: 2017-07-03 | End: 2017-07-04

## 2017-07-03 RX ADMIN — FENTANYL CITRATE 50 MCG: 50 INJECTION, SOLUTION INTRAMUSCULAR; INTRAVENOUS at 16:22

## 2017-07-03 RX ADMIN — FENTANYL CITRATE 100 MCG: 50 INJECTION, SOLUTION INTRAMUSCULAR; INTRAVENOUS at 20:50

## 2017-07-03 RX ADMIN — SODIUM CHLORIDE, POTASSIUM CHLORIDE, SODIUM LACTATE AND CALCIUM CHLORIDE: 600; 310; 30; 20 INJECTION, SOLUTION INTRAVENOUS at 12:01

## 2017-07-03 RX ADMIN — OXYTOCIN-SODIUM CHLORIDE 0.9% IV SOLN 30 UNIT/500ML 1 MILLI-UNITS/MIN: 30-0.9/5 SOLUTION at 13:04

## 2017-07-03 NOTE — PROGRESS NOTES
"CC: Here for routine prenatal visit @ 38w4d   HPI: + FM, no ctx, ? LOF, no VB.  Phone note from weekend indicates she may have had ROM but did not come in for evaluation. Small amount of leaking now.  Not sure    PE: /66 (BP Location: Right arm, Patient Position: Chair, Cuff Size: Adult Small)  Pulse 89  Ht 5' 1.25\" (1.556 m)  Wt 155 lb (70.3 kg)  LMP 10/06/2016  BMI 29.05 kg/m2   See OB flowsheet    amnisure taken    A/P G1 @ 38w4d normal pregnancy    1. Routine prenatal care    RTC 1 week    Guadalupe Petersen M.D.      Addendum: positive amnisure--will send to [facility] for augmentation  "

## 2017-07-03 NOTE — H&P
[facility] Labor and Delivery History and Physical    Mona Torres MRN# [id number]   Age: 26 year old YOB: 1990     Date of Admission:  7/3/2017           Chief Complaint:   Mona Torres is a 26 year old female who is 38w4d pregnant and being admitted for SROM.  SROM on ?  amnisure positive in office.  GBS negative.           Pregnancy history:     OBSTETRIC HISTORY:    Obstetric History       T0      L0     SAB0   TAB0   Ectopic0   Multiple0   Live Births0       # Outcome Date GA Lbr Juan/2nd Weight Sex Delivery Anes PTL Lv   1 Current                   EDC: Estimated Date of Delivery: 17    Prenatal Labs:   Lab Results   Component Value Date    ABO O 2017    RH  Pos 2017    AS Neg 2017    HEPBANG Nonreactive 2017    CHPCRT  2017     Negative   Negative for C. trachomatis rRNA by transcription mediated amplification.   A negative result by transcription mediated amplification does not preclude the   presence of C. trachomatis infection because results are dependent on proper   and adequate collection, absence of inhibitors, and sufficient rRNA to be   detected.      GCPCRT  2017     Negative   Negative for N. gonorrhoeae rRNA by transcription mediated amplification.   A negative result by transcription mediated amplification does not preclude the   presence of N. gonorrhoeae infection because results are dependent on proper   and adequate collection, absence of inhibitors, and sufficient rRNA to be   detected.      TREPAB Negative 2017    HGB 12.8 2017       GBS Status:   Lab Results   Component Value Date    GBS  2017     Negative  No GBS DNA detected, presumed negative for GBS or number of bacteria may be   below the limit of detection of the assay.   Assay performed on incubated broth culture of specimen using Mc4 real-time   PCR.         Active Problem List  Patient Active Problem List   Diagnosis     Language  barrier     Prenatal care, first pregnancy     Prenatal care, first pregnancy in third trimester       Medication Prior to Admission  Prescriptions Prior to Admission   Medication Sig Dispense Refill Last Dose     Prenatal Vit-Fe Fumarate-FA (PRENATAL MULTIVITAMIN  PLUS IRON) 27-0.8 MG TABS per tablet Take 1 tablet by mouth daily   Taking     FOLIC ACID PO Take 1 mg by mouth daily   Taking   .        Maternal Past Medical History:     Past Medical History:   Diagnosis Date     NO ACTIVE PROBLEMS                        Family History:     Family History   Problem Relation Age of Onset     Hypertension Mother      Family history reviewed and updated in [facility]            Social History:     Social History   Substance Use Topics     Smoking status: Never Smoker     Smokeless tobacco: Not on file     Alcohol use No            Review of Systems:   The Review of Systems is negative other than noted in the HPI          Physical Exam:   Vitals were reviewed  Temp: 98.8  F (37.1  C) Temp src: Oral BP: 105/61 Pulse: 86   Resp: 16        Constitutional:   awake, alert, cooperative, no apparent distress, and appears stated age     Lungs:   No increased work of breathing, good air exchange, clear to auscultation bilaterally, no crackles or wheezing     Cardiovascular:   normal S1 and S2      Cervix:   Membranes: SROM   Dilation: 2   Effacement: 50%   Station:-2   Consistency: soft   Position: Posterior  Presentation:Cephalic  Fetal Heart Rate Tracing: reactive and reassuring, Tier 1 (normal)  Tocometer: external monitor                       Assessment:   Mona Torres is a 38w4d pregnant female admitted with SROM.          Plan:   Admit - see IP orders  Pain medication upon request  Labor augmentation with Pitocin. Discussed with Mona Torres, the following; indications; the agents and methods of labor augmentation, including risks, benefits, and alternative approaches; and the possible need for  birth. EFW is AGA.    The Labor  Induction:what you need to know information sheet was made available to her. Questions and concerns were addressed and patient agrees to above if necessary during the course of her labor.     services used (Mandarin Chinese) through [employer] on iPad.  Guadalupe Petersen MD

## 2017-07-03 NOTE — PROGRESS NOTES
Patient plans to feed her baby by Breast feeding . Reviewed benefits of breastfeeding including; protecting baby from ear infection, asthma, eczema, lung infections, obesity, gi infections, urinary infections and childhood diabetes. Also included mom's benefits of protecting against obesity, breast and ovarian cancer and osteoporosis. Encouraged cue based feeding to prevent encouragement, ensure a good milk supply and a contented baby.    Skin to skin contact was introduced including benefits of calming baby, regulating vital signs and infant glucose levels, bonding and facilitates breastfeeding. Encouraged to be skin to skin as often as possible with either parent as it helps to relax and comfort infant.    Reviewed rooming in practice so that she can learn baby feeding cues, how to handle and comfort her baby, enable demand feedings and allow baby to recognize her as mother.

## 2017-07-03 NOTE — IP AVS SNAPSHOT
MRN:[id number]                      After Visit Summary   7/3/2017    Mona Torres    MRN: [id number]           Thank you!     Thank you for choosing [facility] for your care. Our goal is always to provide you with excellent care. Hearing back from our patients is one way we can continue to improve our services. Please take a few minutes to complete the written survey that you may receive in the mail after you visit with us. Thank you!        Patient Information     Date Of Birth          1990        Designated Caregiver       Most Recent Value    Caregiver    Will someone help with your care after discharge? no      About your hospital stay     You were admitted on:  July 3, 2017 You last received care in the:  [facility]    You were discharged on:  July 6, 2017       Who to Call     For medical emergencies, please call 911.  For non-urgent questions about your medical care, please call your primary care provider or clinic, None          Attending Provider     Provider Specialty    Guadalupe Petersen MD OB/Gyn       Primary Care Provider    None Specified      Your next 10 appointments already scheduled     Aug 15, 2017  9:00 AM CDT   [facility]um with [facility] MD   [facility]    [address] [address]   [phone]              Further instructions from your care team       Postpartum Vaginal Delivery Instructions    Activity       Ask family and friends for help when you need it.    Do not place anything in your vagina for 6 weeks.    You are not restricted on other activities, but take it easy for a few weeks to allow your body to recover from delivery.  You are able to do any activities you feel up to that point.    No driving until you have stopped taking your pain medications (usually two weeks after delivery).     Call your health care provider if you have any of these symptoms:       Increased pain,  swelling, redness, or fluid around your stiches from an episiotomy or perineal tear.    A fever above 100.4 F (38 C) with or without chills when placing a thermometer under your tongue.    You soak a sanitary pad with blood within 1 hour, or you see blood clots larger than a golf ball.    Bleeding that lasts more than 6 weeks.    Vaginal discharge that smells bad.    Severe pain, cramping or tenderness in your lower belly area.    A need to urinate more frequently (use the toilet more often), more urgently (use the toilet very quickly), or it burns when you urinate.    Nausea and vomiting.    Redness, swelling or pain around a vein in your leg.    Problems breastfeeding or a red or painful area on your breast.    Chest pain and cough or are gasping for air.    Problems coping with sadness, anxiety, or depression.  If you have any concerns about hurting yourself or the baby, call your provider immediately.     You have questions or concerns after you return home.     Keep your hands clean:  Always wash your hands before touching your perineal area and stitches.  This helps reduce your risk of infection.  If your hands aren't dirty, you may use an alcohol hand-rub to clean your hands. Keep your nails clean and short.      If you have concerns/questions after returning home, contact the nurse triage line [phone] or your primary care clinic.      Pending Results     No orders found from 7/1/2017 to 7/4/2017.            Statement of Approval     Ordered          07/06/17 1349  I have reviewed and agree with all the recommendations and orders detailed in this document.  EFFECTIVE NOW     Approved and electronically signed by:  Isaac Arita MD             Admission Information     Date & Time Provider Department Dept. Phone    7/3/2017 Guadalupe Petersen MD [facility] [phone]      Your Vitals Were     Blood Pressure Pulse Temperature Respirations Last Period Pulse Oximetry    104/51 84  "97.8  F (36.6  C) (Oral) 18 10/06/2016 99%      MyChart Information     [employer] lets you send messages to your doctor, view your test results, renew your prescriptions, schedule appointments and more. To sign up, go to [email or web address] . Click on \"Log in\" on the left side of the screen, which will take you to the Welcome page. Then click on \"Sign up Now\" on the right side of the page.     You will be asked to enter the access code listed below, as well as some personal information. Please follow the directions to create your username and password.     Your access code is: [email or web address]  Expires: 9/3/2017 11:04 AM     Your access code will  in 90 days. If you need help or a new code, please call your [place] clinic or [phone].        Care EveryWhere ID     This is your Care EveryWhere ID. This could be used by other organizations to access your [place] medical records  [id number]        Equal Access to Services     [facility] : Hadii titus betts Soalda, wastevenda lujimmyadaha, qaybta kaalmaalexus goodman, garcia hernandez . So [facility] [phone].    ATENCIÓN: Si habla español, tiene a blankenship disposición servicios gratuitos de asistencia lingüística. Llame al [phone].    We comply with applicable federal civil rights laws and [place] laws. We do not discriminate on the basis of race, color, national origin, age, disability sex, sexual orientation or gender identity.               Review of your medicines      START taking        Dose / Directions    ibuprofen 400 MG tablet   Commonly known as:  ADVIL/MOTRIN   Used for:  Single liveborn infant delivered vaginally        Dose:  400-800 mg   Take 1-2 tablets (400-800 mg) by mouth every 6 hours as needed for other (cramping)   Quantity:  100 tablet   Refills:  0         CONTINUE these medicines which have NOT CHANGED        Dose / Directions    FOLIC ACID PO        Dose:  1 mg   Take 1 mg by mouth daily   Refills:  0       " prenatal multivitamin  plus iron 27-0.8 MG Tabs per tablet        Dose:  1 tablet   Take 1 tablet by mouth daily   Refills:  0            Where to get your medicines      These medications were sent to [facility] [place], [place] - [address]  [address] [address]     Phone:  [phone]     ibuprofen 400 MG tablet                Protect others around you: Learn how to safely use, store and throw away your medicines at www.disposemymeds.org.             Medication List: This is a list of all your medications and when to take them. Check marks below indicate your daily home schedule. Keep this list as a reference.      Medications           Morning Afternoon Evening Bedtime As Needed    FOLIC ACID PO   Take 1 mg by mouth daily                                ibuprofen 400 MG tablet   Commonly known as:  ADVIL/MOTRIN   Take 1-2 tablets (400-800 mg) by mouth every 6 hours as needed for other (cramping)   Last time this was given:  800 mg on 7/5/2017  5:19 PM                                prenatal multivitamin  plus iron 27-0.8 MG Tabs per tablet   Take 1 tablet by mouth daily   Last time this was given:  1 tablet on 7/6/2017  9:10 AM

## 2017-07-03 NOTE — PLAN OF CARE
Problem: Labor (Cervical Ripen, Induct, Augment) (Adult,Obstetrics,Pediatric)  Goal: Signs and Symptoms of Listed Potential Problems Will be Absent or Manageable (Labor)  Signs and symptoms of listed potential problems will be absent or manageable by discharge/transition of care (reference Labor (Cervical Ripen, Induct, Augment) (Adult,Obstetrics,Pediatric) CPG).   Patient requesting pain meds thru her IV, and stated that she didn't want her meds in her back yet. Called Dr. Petersen and orders received for fentanyl IV PRN.

## 2017-07-03 NOTE — NURSING NOTE
"Initial /66 (BP Location: Right arm, Patient Position: Chair, Cuff Size: Adult Small)  Pulse 89  Ht 5' 1.25\" (1.556 m)  Wt 155 lb (70.3 kg)  LMP 10/06/2016  BMI 29.05 kg/m2 Estimated body mass index is 29.05 kg/(m^2) as calculated from the following:    Height as of this encounter: 5' 1.25\" (1.556 m).    Weight as of this encounter: 155 lb (70.3 kg). .      "

## 2017-07-03 NOTE — PLAN OF CARE
Problem: Labor (Cervical Ripen, Induct, Augment) (Adult,Obstetrics,Pediatric)  Goal: Signs and Symptoms of Listed Potential Problems Will be Absent or Manageable (Labor)  Signs and symptoms of listed potential problems will be absent or manageable by discharge/transition of care (reference Labor (Cervical Ripen, Induct, Augment) (Adult,Obstetrics,Pediatric) CPG).  Assumed care of patient after introduction and bedside report received from KELVIN Michel RN. Patient comfortable and was on her cell phone.

## 2017-07-03 NOTE — PROVIDER NOTIFICATION
07/03/17 1802   Provider Notification   Provider Name/Title Dr. Kenny   Method of Notification At Bedside   Notification Reason Other (Comment)  (MD introducing herself with  nell.)

## 2017-07-03 NOTE — MR AVS SNAPSHOT
"              After Visit Summary   7/3/2017    Mona Torres    MRN: [id number]           Patient Information     Date Of Birth          1990        Visit Information        Provider Department      7/3/2017 10:00 AM Lore, Abel; Guadalupe Petersen MD [facility]        Today's Diagnoses     Prenatal care, first pregnancy, third trimester    -  1    Leakage of amniotic fluid           Follow-ups after your visit        Who to contact     If you have questions or need follow up information about today's clinic visit or your schedule please contact [facility] directly at [phone].  Normal or non-critical lab and imaging results will be communicated to you by [employer], letter or phone within 4 business days after the clinic has received the results. If you do not hear from us within 7 days, please contact the clinic through [employer] or phone. If you have a critical or abnormal lab result, we will notify you by phone as soon as possible.  Submit refill requests through [employer] or call your pharmacy and they will forward the refill request to us. Please allow 3 business days for your refill to be completed.          Additional Information About Your Visit        MyChart Information     [employer] lets you send messages to your doctor, view your test results, renew your prescriptions, schedule appointments and more. To sign up, go to [email or web address] . Click on \"Log in\" on the left side of the screen, which will take you to the Welcome page. Then click on \"Sign up Now\" on the right side of the page.     You will be asked to enter the access code listed below, as well as some personal information. Please follow the directions to create your username and password.     Your access code is: [email or web address]  Expires: 9/3/2017 11:04 AM     Your access code will  in 90 days. If you need help or a new code, please call your [facility] or [phone].        Care EveryWhere ID     " "This is your Care EveryWhere ID. This could be used by other organizations to access your [place] medical records  [id number]        Your Vitals Were     Pulse Height Last Period BMI (Body Mass Index)          89 5' 1.25\" (1.556 m) 10/06/2016 29.05 kg/m2         Blood Pressure from Last 3 Encounters:   07/03/17 102/66   06/27/17 102/65   06/22/17 98/69    Weight from Last 3 Encounters:   07/03/17 155 lb (70.3 kg)   06/27/17 151 lb (68.5 kg)   06/22/17 150 lb 9.6 oz (68.3 kg)              We Performed the Following     Rupture of membranes by Amnisure        Primary Care Provider    None Specified       No primary provider on file.        Equal Access to Services     TOM MIRELES : Hugo Haas, myron lopez, tyson winslowalkana goodman, garcia hernandez . So [facility] [phone].    ATENCIÓN: Si habla español, tiene a blankenship disposición servicios gratuitos de asistencia lingüística. Llame al [phone].    We comply with applicable federal civil rights laws and [place] laws. We do not discriminate on the basis of race, color, national origin, age, disability sex, sexual orientation or gender identity.            Thank you!     Thank you for choosing [facility]  for your care. Our goal is always to provide you with excellent care. Hearing back from our patients is one way we can continue to improve our services. Please take a few minutes to complete the written survey that you may receive in the mail after your visit with us. Thank you!             Your Updated Medication List - Protect others around you: Learn how to safely use, store and throw away your medicines at www.disposemymeds.org.          This list is accurate as of: 7/3/17 10:58 AM.  Always use your most recent med list.                   Brand Name Dispense Instructions for use Diagnosis    FOLIC ACID PO      Take 1 mg by mouth daily        prenatal multivitamin  plus iron 27-0.8 MG Tabs per tablet "      Take 1 tablet by mouth daily

## 2017-07-03 NOTE — PROGRESS NOTES
S: Admit to Inpatient for PROM and augmentation of labor  A: Vital Signs  Temp: 98.8  F (37.1  C)  Temp src: Oral  Resp: 16  Pulse: 86  BP: 105/61  Cervical Exam  Dilation: 2  Effacement (%): 50  Station: -2     FHR  Monitor: External US  Variability: Moderate  Baseline Rate (Fetus A): 140 bpm  Baseline Classification: Normal  Accelerations: Present  Decelerations: None  FHTs are category 1.  Uterine Activity  Monitor: [place]  Contraction Frequency (minutes): 3-4  Contraction Duration (seconds): 60-90  Contraction Quality: Not feeling contraction  Resting Tone Palpated: Soft  Admission on 07/03/2017   Component Date Value     ABO 07/03/2017 Pending      RH(D) 07/03/2017 Pending      Specimen Expires 07/03/2017 Pending    Prenatal Office Visit on 07/03/2017   Component Date Value     Amnisure 07/03/2017 Positive*     Dr. SHONNA Petersen informed of above and admission orders received.   R: Plan of care reviewed with patient. Oriented to room. Reviewed resource binder, [facility] book and paperwork to complete.

## 2017-07-03 NOTE — IP AVS SNAPSHOT
[facility]    [address] [address]    Phone:  [phone]    Fax:  [phone]                                       After Visit Summary   7/3/2017    Mona Torres    MRN: [id number]           After Visit Summary Signature Page     I have received my discharge instructions, and my questions have been answered. I have discussed any challenges I see with this plan with the nurse or doctor.    ..........................................................................................................................................  Patient/Patient Representative Signature      ..........................................................................................................................................  Patient Representative Print Name and Relationship to Patient    ..................................................               ................................................  Date                                            Time    ..........................................................................................................................................  Reviewed by Signature/Title    ...................................................              ..............................................  Date                                                            Time

## 2017-07-04 LAB — HGB BLD-MCNC: 9.9 G/DL (ref 11.7–15.7)

## 2017-07-04 PROCEDURE — 59410 OBSTETRICAL CARE: CPT | Performed by: OBSTETRICS & GYNECOLOGY

## 2017-07-04 PROCEDURE — 0UQGXZZ REPAIR VAGINA, EXTERNAL APPROACH: ICD-10-PCS | Performed by: OBSTETRICS & GYNECOLOGY

## 2017-07-04 PROCEDURE — 12000027 ZZH R&B OB

## 2017-07-04 PROCEDURE — 85018 HEMOGLOBIN: CPT | Performed by: OBSTETRICS & GYNECOLOGY

## 2017-07-04 PROCEDURE — 25000132 ZZH RX MED GY IP 250 OP 250 PS 637: Performed by: OBSTETRICS & GYNECOLOGY

## 2017-07-04 PROCEDURE — 72200001 ZZH LABOR CARE VAGINAL DELIVERY SINGLE

## 2017-07-04 PROCEDURE — 12000031 ZZH R&B OB CRITICAL

## 2017-07-04 PROCEDURE — 0KQM0ZZ REPAIR PERINEUM MUSCLE, OPEN APPROACH: ICD-10-PCS | Performed by: OBSTETRICS & GYNECOLOGY

## 2017-07-04 PROCEDURE — 36415 COLL VENOUS BLD VENIPUNCTURE: CPT | Performed by: OBSTETRICS & GYNECOLOGY

## 2017-07-04 PROCEDURE — 25000125 ZZHC RX 250: Performed by: OBSTETRICS & GYNECOLOGY

## 2017-07-04 RX ORDER — IBUPROFEN 400 MG/1
400-800 TABLET, FILM COATED ORAL EVERY 6 HOURS PRN
Status: DISCONTINUED | OUTPATIENT
Start: 2017-07-04 | End: 2017-07-06 | Stop reason: HOSPADM

## 2017-07-04 RX ORDER — AMOXICILLIN 250 MG
1-2 CAPSULE ORAL 2 TIMES DAILY
Status: DISCONTINUED | OUTPATIENT
Start: 2017-07-04 | End: 2017-07-06 | Stop reason: HOSPADM

## 2017-07-04 RX ORDER — BISACODYL 10 MG
10 SUPPOSITORY, RECTAL RECTAL DAILY PRN
Status: DISCONTINUED | OUTPATIENT
Start: 2017-07-06 | End: 2017-07-06 | Stop reason: HOSPADM

## 2017-07-04 RX ORDER — LANOLIN 100 %
OINTMENT (GRAM) TOPICAL
Status: DISCONTINUED | OUTPATIENT
Start: 2017-07-04 | End: 2017-07-06 | Stop reason: HOSPADM

## 2017-07-04 RX ORDER — HYDROCORTISONE 2.5 %
CREAM (GRAM) TOPICAL 3 TIMES DAILY PRN
Status: DISCONTINUED | OUTPATIENT
Start: 2017-07-04 | End: 2017-07-06 | Stop reason: HOSPADM

## 2017-07-04 RX ORDER — OXYTOCIN/0.9 % SODIUM CHLORIDE 30/500 ML
100 PLASTIC BAG, INJECTION (ML) INTRAVENOUS CONTINUOUS
Status: DISCONTINUED | OUTPATIENT
Start: 2017-07-04 | End: 2017-07-06 | Stop reason: HOSPADM

## 2017-07-04 RX ORDER — NALOXONE HYDROCHLORIDE 0.4 MG/ML
.1-.4 INJECTION, SOLUTION INTRAMUSCULAR; INTRAVENOUS; SUBCUTANEOUS
Status: DISCONTINUED | OUTPATIENT
Start: 2017-07-04 | End: 2017-07-06 | Stop reason: HOSPADM

## 2017-07-04 RX ORDER — OXYTOCIN/0.9 % SODIUM CHLORIDE 30/500 ML
340 PLASTIC BAG, INJECTION (ML) INTRAVENOUS CONTINUOUS PRN
Status: DISCONTINUED | OUTPATIENT
Start: 2017-07-04 | End: 2017-07-06 | Stop reason: HOSPADM

## 2017-07-04 RX ORDER — ACETAMINOPHEN 325 MG/1
650 TABLET ORAL EVERY 4 HOURS PRN
Status: DISCONTINUED | OUTPATIENT
Start: 2017-07-04 | End: 2017-07-06 | Stop reason: HOSPADM

## 2017-07-04 RX ORDER — PRENATAL VIT/IRON FUM/FOLIC AC 27MG-0.8MG
1 TABLET ORAL DAILY
Status: DISCONTINUED | OUTPATIENT
Start: 2017-07-04 | End: 2017-07-06 | Stop reason: HOSPADM

## 2017-07-04 RX ORDER — LIDOCAINE HYDROCHLORIDE 10 MG/ML
INJECTION, SOLUTION EPIDURAL; INFILTRATION; INTRACAUDAL; PERINEURAL
Status: DISPENSED
Start: 2017-07-04 | End: 2017-07-04

## 2017-07-04 RX ORDER — OXYTOCIN 10 [USP'U]/ML
10 INJECTION, SOLUTION INTRAMUSCULAR; INTRAVENOUS
Status: DISCONTINUED | OUTPATIENT
Start: 2017-07-04 | End: 2017-07-06 | Stop reason: HOSPADM

## 2017-07-04 RX ORDER — LIDOCAINE HYDROCHLORIDE 10 MG/ML
INJECTION, SOLUTION INFILTRATION; PERINEURAL
Status: DISPENSED
Start: 2017-07-04 | End: 2017-07-04

## 2017-07-04 RX ORDER — MISOPROSTOL 200 UG/1
400 TABLET ORAL
Status: DISCONTINUED | OUTPATIENT
Start: 2017-07-04 | End: 2017-07-06 | Stop reason: HOSPADM

## 2017-07-04 RX ADMIN — PRENATAL VIT W/ FE FUMARATE-FA TAB 27-0.8 MG 1 TABLET: 27-0.8 TAB at 08:24

## 2017-07-04 RX ADMIN — OXYTOCIN-SODIUM CHLORIDE 0.9% IV SOLN 30 UNIT/500ML 340 ML/HR: 30-0.9/5 SOLUTION at 00:31

## 2017-07-04 RX ADMIN — IBUPROFEN 800 MG: 400 TABLET ORAL at 08:24

## 2017-07-04 RX ADMIN — SENNOSIDES AND DOCUSATE SODIUM 1 TABLET: 8.6; 5 TABLET ORAL at 08:24

## 2017-07-04 NOTE — L&D DELIVERY NOTE
Delivery Summary    Mona Torres MRN# [id number]   Age: 26 year old YOB: 1990     Vaginal Delivery Summary    38w5d arrived with PROM unknown duration but >24 hours, GBS negative. cx /-2. On arrival pitocin was started for augmentation with max rate of 6 mU.  forebag was ruptured at 3cm and rapidly progressed to complete.  Had IV fentanyl x2 during labor.  Pushed for 2 hours using the  frequently to assist with coaching.  During 2nd hour of pushing, began having brisk vaginal bleeding from a laceration higher in vagina. Had 400 cc blood out prior to delivery.    Pushed to deliver viable female infant on 2017 at 0027 with spontaneous cry.   Anterior shoulder delivered with ease followed by posterior shoulder.   Put on mother's chest where delayed cord clamping was done.    Placenta spontaneous with controlled traction on the cord, intact, 3 vessels and Pitocin IV given with good tone.   EBL total of 700    Bilateral sulcus tears and a 2nd degree laceration infiltrated with 1% lidocaine and repaired using 3-0 Vicryl suture in the usual fashion.  Had arterial bleeder on pt left side and figure of X placed for hemostasis.  This was probably cause of excessive bleeding prior to delivery.  Mother and infant in stable condition.  Hemoglobin   Date Value Ref Range Status   2017 13.1 11.7 - 15.7 g/dL Final   ]  Will check repeat Hgb in am and discussed all of above with pt and spouse via ipad  after delivery.     ZOHRA JASON            Labor Event Times    Labor onset date:  7/3/17 Onset time:   8:02 PM   Dilation complete date:  7/3/17 Complete time:  10:29 PM   Start pushing date/time:  7/3/2017 2229            Labor Length    1st Stage (hrs):  2 (min):  27   2nd Stage (hrs):  1 (min):  57   3rd Stage (hrs):  0 (min):  6      Labor Events     labor?:  No    steroids:  None   Labor Type:  Augmentation   Predominate monitoring during 1st stage:   continuous electronic fetal monitoring      Antibiotics received during labor?:  No      Rupture date/time:     Rupture type:  Artificial Rupture of Membranes      Augmentation:  Oxytocin   Indications for augmentation:  Ineffective Contraction Pattern   1:1 continuous labor support provided by?:  RN Labor partogram used?:  no         Delivery/Placenta Date and Time    Delivery Date:  17 Delivery Time:  12:27 AM   Placenta Date/Time:  2017 12:33 AM   Oxytocin given at the time of delivery:  after delivery of baby      Vaginal Counts    Initial count performed by 2 team members:   Two Team Members   Juice FRAGA RN          Needles Suture [place] Sponges Instruments   Initial counts 2  5    Added to count  3 10    Final counts          Placed during labor Accounted for at the end of labor   NA    NA    NA       Final count performed by 2 team members:   Two Team Members   MIKO Villa RN         Final count correct?:  Yes         Apgars    Living status:  Living    1 Minute 5 Minute 10 Minute 15 Minute 20 Minute   Skin color: 1  1       Heart rate: 2  2       Reflex irritability: 2  2       Muscle tone: 1  2       Respiratory effort: 2  2       Total: 8  9          Apgars assigned by:  MIKO TURNER RN      Cord    Vessels:  3 Vessels Complications:  None   Cord Blood Disposition:  Lab Gases Sent?:  No         [place] Resuscitation    Methods:  None          Measurements    Weight:  106.2 oz    Head circumference:  0.33 m       Skin to Skin and Feeding Plan    Skin to skin initiation date/time: 17 0033   Skin to skin with:  Mother   Skin to skin end date/time:     How do you plan to feed your baby:  Breastfeeding      Labor Events and Shoulder Dystocia    Shoulder dystocia present?:  Neg            Delivery (Maternal) (Provider to Complete) (530831)    Episiotomy:  None   Perineal lacerations:  2nd Repaired?:  Yes   Sulcus laceration:  bilateral Repaired?:  Yes   Est. blood loss  (mL):  700         Mother's Information  Mother: Mona Torres  #[id number]    Start of Mother's Information     IO Blood Loss  07/03/17 2002 - 07/04/17 0126    Mom's I/O Activity            End of Mother's Information  Mother: Mona Torres  #[id number]            Delivery - Provider to Complete (277259)    Delivering clinician:  ZOHRA JASON   Attempted Delivery Types (Choose all that apply):  Spontaneous Vaginal Delivery   Delivery Type (Choose the 1 that will go to the Birth History):  Vaginal, Spontaneous Delivery                           Placenta    Delayed Cord Clamping:  Done   Date/Time:  7/4/2017 12:33 AM   Removal:  Spontaneous   Disposition:  Hospital disposal      Anesthesia    Method:  INTRAVENOUS REGIONAL, Local         Presentation and Position    Presentation:  Vertex    Occiput Anterior                    ZOHRA JASON MD

## 2017-07-04 NOTE — PROVIDER NOTIFICATION
07/03/17 2242   Provider Notification   Provider Name/Title Dr. Kenny    Method of Notification At Bedside   Notification Reason Other (Comment)  ( service)   Pushing with patient.

## 2017-07-04 NOTE — PROGRESS NOTES
Pt stated she did not feel the urge to use the restroom. Writer encouraged pt to keep drinking water and stay hydrated. Will continue to monitor.

## 2017-07-04 NOTE — PLAN OF CARE
Problem: Postpartum, Vaginal Delivery (Adult)  Goal: Signs and Symptoms of Listed Potential Problems Will be Absent or Manageable (Postpartum, Vaginal Delivery)  Signs and symptoms of listed potential problems will be absent or manageable by discharge/transition of care (reference Postpartum, Vaginal Delivery (Adult) CPG).  Outcome: Improving  S:Delivery  B:Spontaneous Labor,38w5d    Lab Results   Component Value Date     GBS   2017       Negative  No GBS DNA detected, presumed negative for GBS or number of bacteria may be   below the limit of detection of the assay.   Assay performed on incubated broth culture of specimen using [employer] real-time   PCR.       with antibiotic treatment not indicated 4 hours prior to delivery.  A: Patient delivered  lac 2nd degree at 0027 with Dr. Dr Escobar in attendance and baby placed on mother's abdomen for delayed cord clamping. Baby dried and stimulated. Baby placed  skin to skin @ 0027.. Apgars 8/9.  IV infusion of Oxytocin  infused. Placenta removal spontaneous. See Flowsheet for VS and PP checks. Labor care plan goals met, transition now to postpartum care.  R: Expect routine postpartum care. Anticipate first feeding within the hour or whenever infant displays feeding cues. Continue skin to skin. Prior discussion with mother indicates that feeding plan is Breast feeding . Educated mother on importance of exclusive breastfeeding, expected feeding readiness cues and encouraged her to observe for these cues while rooming in. Informed her that breastfeeding assistance would be provided.     Destiny Neely RN 2017 1:38 AM

## 2017-07-04 NOTE — PROGRESS NOTES
Now complete and pushing.  Only fentanyl x2 IV for pain medication.  Making progress.  Using  to help with pushing    EFM:   120 baseline, moderate variablility, + accels, early decelerations, Category I      Anticipate .    Maricarmen Kenny MD

## 2017-07-04 NOTE — PROVIDER NOTIFICATION
07/03/17 2002   Provider Notification   Provider Name/Title Dr. Kenny   Method of Notification At Bedside   Notification Reason SVE;Other (Comment)   Juliet  service utilized.  AROM 2007, large clear fluid noted.  Pt 3/90/-1.  Declines anything further for pain at this time.

## 2017-07-04 NOTE — PROGRESS NOTES
"At 0250 patient up to bathroom, only able to void about 25ml. Gaby cares, dermoplast and tucks used with new pads. Gown changed. Patient sitting on toilet and felt \"tired\" but was slightly dizzy. Additional NST to bathroom. Patient felt better, assisted to wheelchair and moved to post partum room 2046 in stable condition. Fundus firm at U, small bleeding no clots. Educated on comforting baby. Baby moved via crib to postpartum accompanied by RN and dad.   "

## 2017-07-04 NOTE — PLAN OF CARE
Problem: Labor (Cervical Ripen, Induct, Augment) (Adult,Obstetrics,Pediatric)  Goal: Signs and Symptoms of Listed Potential Problems Will be Absent or Manageable (Labor)  Signs and symptoms of listed potential problems will be absent or manageable by discharge/transition of care (reference Labor (Cervical Ripen, Induct, Augment) (Adult,Obstetrics,Pediatric) CPG).   Patient straight cathed right after delivery by MD

## 2017-07-04 NOTE — PLAN OF CARE
"Problem: Goal Outcome Summary  Goal: Goal Outcome Summary  Outcome: No Change  Patient up to bathroom with assist x1, needs help with marcus cares etc.  pad being used. VSS. Fundus firm at U, small bleeding. Ice, dermoplast and tucks to bottom. Patient drinking water well. Has not wanted medication for pain. Breastfeeding has been attempted and not going well as she is tired and her significant other is trying to \"help\" by moving around her breasts; RN attempts to assist. Educated on ways to soothe baby as well.      "

## 2017-07-04 NOTE — PROGRESS NOTES
Subjective:   Contractions: feeling stronger  Leakage of fluids: not much        Objective:  Patient Vitals for the past 8 hrs:   BP Temp Temp src Pulse Resp SpO2   17 1956 114/55 98.5  F (36.9  C) Oral - 16 -   17 1800 103/56 - - - 16 97 %   17 1730 109/59 - - - 16 97 %   17 1700 116/67 - - - 16 97 %   17 1630 113/64 97.7  F (36.5  C) Oral - 16 97 %   17 1600 107/61 - - 91 16 -   17 1530 111/67 - - 86 16 -   17 1400 114/57 - - - - -   17 1330 112/57 - - - - -   17 1300 110/60 - - - - -     Cervix: 3/90/-1  Membranes: AROM forebag  clear amniotic fluid    EFM:   130 baseline, moderate variablility, + accels, no decels, Category I  Tocometer: external monitor, frequency q 1-3 minutes and Pitocin @ 6 mU    Assessment:/Plan:   Labor: augmented with Pitocin  Discussed epidural PRN.  Anticipate .  Questions answered via ipad .     ZOHRA JASON

## 2017-07-04 NOTE — PROGRESS NOTES
on phone through pushing, delivery and after delivery. Pt and spouse educated on BF, safe sleep and baby safety via . Questions answered and concerns addressed. Pt declines pain medications at this time. Declines food as well.     Destiny Neely RN 7/4/2017 2:01 AM

## 2017-07-04 NOTE — PROGRESS NOTES
2315 report given to oncoming RN.   Pt continues with pushing efforts,  is at bedside.  Utilizing Flashtalking  service.

## 2017-07-05 LAB — T PALLIDUM IGG+IGM SER QL: NEGATIVE

## 2017-07-05 PROCEDURE — 25000132 ZZH RX MED GY IP 250 OP 250 PS 637: Performed by: OBSTETRICS & GYNECOLOGY

## 2017-07-05 PROCEDURE — 12000027 ZZH R&B OB

## 2017-07-05 PROCEDURE — 12000031 ZZH R&B OB CRITICAL

## 2017-07-05 RX ADMIN — PRENATAL VIT W/ FE FUMARATE-FA TAB 27-0.8 MG 1 TABLET: 27-0.8 TAB at 11:16

## 2017-07-05 RX ADMIN — IBUPROFEN 800 MG: 400 TABLET ORAL at 04:09

## 2017-07-05 RX ADMIN — IBUPROFEN 800 MG: 400 TABLET ORAL at 17:19

## 2017-07-05 RX ADMIN — SENNOSIDES AND DOCUSATE SODIUM 1 TABLET: 8.6; 5 TABLET ORAL at 11:16

## 2017-07-05 NOTE — PLAN OF CARE
Problem: Goal Outcome Summary  Goal: Goal Outcome Summary  Outcome: Improving  Pt assessment wnl, vss.  Plan: Will continue to monitor and asssess.  Pt denied need for pain medication.

## 2017-07-05 NOTE — PROGRESS NOTES
PPD # 1    S: ambulating and voiding.  No complaints  O: /67  Pulse 84  Temp 98.3  F (36.8  C) (Oral)  Resp 16  LMP 10/06/2016  SpO2 96%  Breastfeeding? Unknown   NAD  Abd: soft, nontender, fundus firm  Ext: calves nontender    Hemoglobin   Date Value Ref Range Status   2017 9.9 (L) 11.7 - 15.7 g/dL Final       A/P PPD # 1 s/p     Routine PP care.    Guadalupe Petersen M.D.

## 2017-07-05 NOTE — PLAN OF CARE
"Problem: Postpartum, Vaginal Delivery (Adult)  Goal: Signs and Symptoms of Listed Potential Problems Will be Absent or Manageable (Postpartum, Vaginal Delivery)  Signs and symptoms of listed potential problems will be absent or manageable by discharge/transition of care (reference Postpartum, Vaginal Delivery (Adult) CPG).   Outcome: Improving  Patient understands very simple English. Using mandarin Chinese  at bedside via Ipad. Spoke with  services. Plan is to have in person  come either later today or tomorrow for education and discharge teaching. Postpartum assessment WDL. VSS. Pain tolerable with Ibuprofen. Patient has been dropper feeding infant 5-10cc of formula. \"I feel like she is still hungry, I try to breastfeed but she just hoa and hoa. I want to breast feed but I don't think she is getting enough\" (via ). Encouraged to attempt to bring baby to breast before feeding any formula. Assisted patient with breastfeeding. Infant latches and de-latches frequently but does have good sucks and swallows when latched on. Encouraged patient to keep latching baby on when she comes unlatched. Reassured patient that it is normal and ok for infant to cry a little. Will continue to monitor and assist with cares as needed.          "

## 2017-07-05 NOTE — PLAN OF CARE
Problem: Postpartum, Vaginal Delivery (Adult)  Goal: Signs and Symptoms of Listed Potential Problems Will be Absent or Manageable (Postpartum, Vaginal Delivery)  Signs and symptoms of listed potential problems will be absent or manageable by discharge/transition of care (reference Postpartum, Vaginal Delivery (Adult) CPG).   Outcome: Improving  Pt speaks Mandarin and  has been used x 2 today via I pad.  She needs reassurance with her  especially with breast feeding and milk production.  It was given via .  Pt was up to void another time.  Voided large amount.  Assistance given with pericare and pad changing.  Pt is able to be up and ambulating in the room.  VSS.  Afebrile.  Pt stated that when her  arrives she will take a tub bath.   is currently getting one by Fatemeh RODRIGUEZ RN.  Mom watching.

## 2017-07-05 NOTE — PLAN OF CARE
Problem: Postpartum, Vaginal Delivery (Adult)  Goal: Signs and Symptoms of Listed Potential Problems Will be Absent or Manageable (Postpartum, Vaginal Delivery)  Signs and symptoms of listed potential problems will be absent or manageable by discharge/transition of care (reference Postpartum, Vaginal Delivery (Adult) CPG).   Outcome: No Change  VSS. Ambulates in room et doni well. Up majority of shift w/baby who cries frequently. Assist w/breastfeeding et comfort options. Able to hand express colostrum. Declines pumping. Discuss appropriate temp for baby(not too many clothes/blankets), positions when baby is gassy, et that it is normal for babies to cry sometimes. Support offered. (po) rx for perineal discomfort w/stated relief obtained.

## 2017-07-06 VITALS
HEART RATE: 84 BPM | OXYGEN SATURATION: 99 % | RESPIRATION RATE: 18 BRPM | DIASTOLIC BLOOD PRESSURE: 51 MMHG | TEMPERATURE: 97.8 F | SYSTOLIC BLOOD PRESSURE: 104 MMHG

## 2017-07-06 PROCEDURE — 25000132 ZZH RX MED GY IP 250 OP 250 PS 637: Performed by: OBSTETRICS & GYNECOLOGY

## 2017-07-06 RX ORDER — IBUPROFEN 400 MG/1
400-800 TABLET, FILM COATED ORAL EVERY 6 HOURS PRN
Qty: 100 TABLET | Refills: 0 | Status: SHIPPED | OUTPATIENT
Start: 2017-07-06 | End: 2018-08-28

## 2017-07-06 RX ADMIN — SENNOSIDES AND DOCUSATE SODIUM 2 TABLET: 8.6; 5 TABLET ORAL at 09:10

## 2017-07-06 RX ADMIN — PRENATAL VIT W/ FE FUMARATE-FA TAB 27-0.8 MG 1 TABLET: 27-0.8 TAB at 09:10

## 2017-07-06 NOTE — DISCHARGE SUMMARY
[facility] Discharge Summary    Mona Torres MRN# [id number]   Age: 26 year old YOB: 1990     Date of Admission:  7/3/2017  Date of Discharge::  7/6/2017  Admitting Physician:  Guadalupe Petersen MD  Discharge Physician:  Isaac Arita MD     Home clinic: [facility]          Admission Diagnoses:   Pregnancy  Prenatal care, first pregnancy in third trimester          Discharge Diagnosis:   Normal spontaneous vaginal delivery  Intrauterine pregnancy at 38+5 weeks gestation          Procedures:   Procedure(s): No additional procedures performed       No other procedures performed during this admission           Medications Prior to Admission:     Prescriptions Prior to Admission   Medication Sig Dispense Refill Last Dose     Prenatal Vit-Fe Fumarate-FA (PRENATAL MULTIVITAMIN  PLUS IRON) 27-0.8 MG TABS per tablet Take 1 tablet by mouth daily   7/2/2017 at pm     FOLIC ACID PO Take 1 mg by mouth daily   7/2/2017 at pm             Discharge Medications:     Current Discharge Medication List      START taking these medications    Details   ibuprofen (ADVIL/MOTRIN) 400 MG tablet Take 1-2 tablets (400-800 mg) by mouth every 6 hours as needed for other (cramping)  Qty: 100 tablet, Refills: 0    Associated Diagnoses: Single liveborn infant delivered vaginally         CONTINUE these medications which have NOT CHANGED    Details   Prenatal Vit-Fe Fumarate-FA (PRENATAL MULTIVITAMIN  PLUS IRON) 27-0.8 MG TABS per tablet Take 1 tablet by mouth daily      FOLIC ACID PO Take 1 mg by mouth daily                   Consultations:   No consultations were requested during this admission          Brief History of Labor:     Mona Torres MRN# [id number]   Age: 26 year old YOB: 1990      Vaginal Delivery Summary     38w5d arrived with PROM unknown duration but >24 hours, GBS negative. cx 2/50/-2. On arrival pitocin was started for augmentation with max rate of 6 mU.  forebag was ruptured at 3cm  and rapidly progressed to complete.  Had IV fentanyl x2 during labor.  Pushed for 2 hours using the  frequently to assist with coaching.  During 2nd hour of pushing, began having brisk vaginal bleeding from a laceration higher in vagina. Had 400 cc blood out prior to delivery.     Pushed to deliver viable female infant on July 4, 2017 at 0027 with spontaneous cry.   Anterior shoulder delivered with ease followed by posterior shoulder.   Put on mother's chest where delayed cord clamping was done.     Placenta spontaneous with controlled traction on the cord, intact, 3 vessels and Pitocin IV given with good tone.   EBL total of 700     Bilateral sulcus tears and a 2nd degree laceration infiltrated with 1% lidocaine and repaired using 3-0 Vicryl suture in the usual fashion.  Had arterial bleeder on pt left side and figure of X placed for hemostasis.  This was probably cause of excessive bleeding prior to delivery.  Mother and infant in stable condition.             Hospital Course:   The patient's hospital course was unremarkable.  On discharge, her pain was well controlled. Vaginal bleeding is similar to peak menstrual flow.  Voiding without difficulty.  Ambulating well and tolerating a normal diet.  No fever.  Breastfeeding well.  Infant is stable.  No bowel movement yet.*  She was discharged on post-partum day #2.  /51  Pulse 84  Temp 97.8  F (36.6  C) (Oral)  Resp 18  LMP 10/06/2016  SpO2 99%  Breastfeeding? Unknown  Exam:  Constitutional: healthy, alert and no distress  Head: Normocephalic. No masses, lesions, tenderness or abnormalities  Gastrointestinal: Abdomen soft, non-tender. BS normal. No masses, organomegaly  FF@U-3  : Deferred  Musculoskeletal: extremities normal- no gross deformities noted, gait normal and normal muscle tone  Skin: no suspicious lesions or rashes  Neurologic: Gait normal. Reflexes normal and symmetric. Sensation grossly WNL.  Psychiatric: mentation appears normal  and affect normal/bright        Post-partum hemoglobin:   Hemoglobin   Date Value Ref Range Status   07/04/2017 9.9 (L) 11.7 - 15.7 g/dL Final             Discharge Instructions and Follow-Up:   Discharge diet: Regular   Discharge activity: Pelvic rest: abstain from intercourse and do not use tampons for 6 week(s)   Discharge follow-up: Follow up with   in 6 weeks   Wound care: Drink plenty of fluids           Discharge Disposition:   Discharged to home      Attestation:  I have reviewed today's vital signs, notes, medications, labs and imaging.  Amount of time performed on this discharge summary: 15 minutes.   used    Isaac Arita MD

## 2017-07-06 NOTE — DISCHARGE INSTRUCTIONS

## 2017-07-06 NOTE — PLAN OF CARE
Problem: Discharge Planning  Goal: Discharge Planning (Adult, OB, Behavioral, Peds)  Outcome: Adequate for Discharge Date Met:  17  Pt discharged to home with . Interpretor via I pad was used to review discharge teaching.  Discharge instructions given reviewed and signed.  Pt verbalized understanding.  Appropriate questions asked.  Id's verified with 's.  Pt was able to ambulate to the door accompanied by staff.  Rx's were picked up at out patient pharmacy on the way to the car.

## 2017-07-06 NOTE — PLAN OF CARE
Problem: Postpartum, Vaginal Delivery (Adult)  Goal: Signs and Symptoms of Listed Potential Problems Will be Absent or Manageable (Postpartum, Vaginal Delivery)  Signs and symptoms of listed potential problems will be absent or manageable by discharge/transition of care (reference Postpartum, Vaginal Delivery (Adult) CPG).   Outcome: Improving  Data: Vital signs within normal limits. Postpartum checks within normal limits - see flow record. Patient eating and drinking normally. Patient able to empty bladder independently. Patient ambulating independently. No apparent signs of infection. perineum healing well. Patient Is performing self cares and Is able to care for infant. Positive attachment behaviors are observed with infant. Support persons are present.  Action:  Pain plan was discussed. Pt is not using pain medication at this time. Patient education done about hand hygiene, formula feeding,  cares and postpartum cares. See flow record.  Response:   Patient stated no pain during the night.  Plan: Anticipate discharge on 2017     .

## 2017-07-06 NOTE — PLAN OF CARE
Problem: Postpartum, Vaginal Delivery (Adult)  Goal: Signs and Symptoms of Listed Potential Problems Will be Absent or Manageable (Postpartum, Vaginal Delivery)  Signs and symptoms of listed potential problems will be absent or manageable by discharge/transition of care (reference Postpartum, Vaginal Delivery (Adult) CPG).   Outcome: Therapy, progress toward functional goals as expected  Postpartum assessment WDL. VSS. Pain tolerable. Ibuprofen offered but refused. Education on self-care and infant care given to patient and  with  via I-pad. Questions answered and encouraged. Bottle feeding baby every 3-4 hours and on demand. Will continue to monitor and assist with cares as needed.

## 2017-08-15 ENCOUNTER — PRENATAL OFFICE VISIT (OUTPATIENT)
Dept: OBGYN | Facility: CLINIC | Age: 27
End: 2017-08-15
Payer: COMMERCIAL

## 2017-08-15 VITALS
DIASTOLIC BLOOD PRESSURE: 76 MMHG | TEMPERATURE: 97.4 F | WEIGHT: 146.6 LBS | BODY MASS INDEX: 27.68 KG/M2 | HEART RATE: 78 BPM | HEIGHT: 61 IN | SYSTOLIC BLOOD PRESSURE: 110 MMHG

## 2017-08-15 DIAGNOSIS — R21 RASH AND NONSPECIFIC SKIN ERUPTION: ICD-10-CM

## 2017-08-15 PROBLEM — Z34.03 PRENATAL CARE, FIRST PREGNANCY IN THIRD TRIMESTER: Status: RESOLVED | Noted: 2017-07-03 | Resolved: 2017-08-15

## 2017-08-15 PROBLEM — Z34.00 PRENATAL CARE, FIRST PREGNANCY: Status: RESOLVED | Noted: 2017-02-27 | Resolved: 2017-08-15

## 2017-08-15 PROCEDURE — 99207 ZZC POST PARTUM EXAM: CPT | Performed by: OBSTETRICS & GYNECOLOGY

## 2017-08-15 RX ORDER — NYSTATIN AND TRIAMCINOLONE ACETONIDE 100000; 1 [USP'U]/G; MG/G
OINTMENT TOPICAL 2 TIMES DAILY
Qty: 15 G | Refills: 1 | Status: SHIPPED | OUTPATIENT
Start: 2017-08-15 | End: 2018-08-28

## 2017-08-15 ASSESSMENT — PATIENT HEALTH QUESTIONNAIRE - PHQ9: SUM OF ALL RESPONSES TO PHQ QUESTIONS 1-9: 0

## 2017-08-15 NOTE — PROGRESS NOTES
"Mona is here for a 6-week postpartum checkup.    She had a  of a liveborn baby girl, weight 6 pounds 10 oz.  The delivery was uncomplicated.  Since delivery, she has been breast feeding but also supplementing.  She has not had a normal menses.  She has not had intercourse.  Patient screened for postpartum depression and complaints are NEGATIVE. Screening has also been completed for intimate partner violence. She would like to discuss rash on her hands--not itchy.    Her last pap was  and was Normal    EXAM: /76 (BP Location: Right arm, Patient Position: Chair, Cuff Size: Adult Regular)  Pulse 78  Temp 97.4  F (36.3  C) (Oral)  Ht 5' 1.25\" (1.556 m)  Wt 146 lb 9.6 oz (66.5 kg)  LMP 10/06/2016  Breastfeeding? No  BMI 27.47 kg/m2    HEENT: grossly normal.  NECK: no lymphadenopathy or thyromegaly.  ABDOMEN: soft, non tender, good bowel sounds, without masses, rebound, guarding or tenderness.  EXTREMITIES: Warm to touch, no ankle edema or calf tenderness.    PELVIC:    External genitalia: normal without lesion, perineum well healed   Vagina: normal mucosa and rugae, normal discharge.  Cervix: normal without lesion.  Uterus: small, mobile, nontender.  Adnexa: no masses, no tenderness  Rectal: deferred, external hemorrhoids absent    PHQ-9 ([employer]) 8/15/2017   1.  Little interest or pleasure in doing things 0   2.  Feeling down, depressed, or hopeless 0   3.  Trouble falling or staying asleep, or sleeping too much 0   4.  Feeling tired or having little energy 0   5.  Poor appetite or overeating 0   6.  Feeling bad about yourself 0   7.  Trouble concentrating 0   8.  Moving slowly or restless 0   9.  Suicidal or self-harm thoughts 0   PHQ-9 Total Score 0     A/P  Routine Postpartum    1. Contraception: condoms  2. Annual due 2018    Guadalupe Petersen M.D.   "

## 2017-08-15 NOTE — MR AVS SNAPSHOT
"              After Visit Summary   8/15/2017    Mona Torrse    MRN: [id number]           Patient Information     Date Of Birth          1990        Visit Information        Provider Department      8/15/2017 8:45 AM Lore, Abel; Guadalupe Petersen MD [facility]        Today's Diagnoses     Routine postpartum follow-up    -  1    Rash and nonspecific skin eruption           Follow-ups after your visit        Who to contact     If you have questions or need follow up information about today's clinic visit or your schedule please contact [facility] directly at [phone].  Normal or non-critical lab and imaging results will be communicated to you by [employer], letter or phone within 4 business days after the clinic has received the results. If you do not hear from us within 7 days, please contact the clinic through [employer] or phone. If you have a critical or abnormal lab result, we will notify you by phone as soon as possible.  Submit refill requests through [employer] or call your pharmacy and they will forward the refill request to us. Please allow 3 business days for your refill to be completed.          Additional Information About Your Visit        MyChart Information     [employer] lets you send messages to your doctor, view your test results, renew your prescriptions, schedule appointments and more. To sign up, go to [email or web address] . Click on \"Log in\" on the left side of the screen, which will take you to the Welcome page. Then click on \"Sign up Now\" on the right side of the page.     You will be asked to enter the access code listed below, as well as some personal information. Please follow the directions to create your username and password.     Your access code is: [email or web address]  Expires: 9/3/2017 11:04 AM     Your access code will  in 90 days. If you need help or a new code, please call your [facility] or [phone].        Care EveryWhere ID     This is " "your Care EveryWhere ID. This could be used by other organizations to access your [facility] medical records  [id number]        Your Vitals Were     Pulse Temperature Height Last Period Breastfeeding? BMI (Body Mass Index)    78 97.4  F (36.3  C) (Oral) 5' 1.25\" (1.556 m) 10/06/2016 No 27.47 kg/m2       Blood Pressure from Last 3 Encounters:   08/15/17 110/76   07/06/17 104/51   07/03/17 102/66    Weight from Last 3 Encounters:   08/15/17 146 lb 9.6 oz (66.5 kg)   07/03/17 155 lb (70.3 kg)   06/27/17 151 lb (68.5 kg)              Today, you had the following     No orders found for display         Today's Medication Changes          These changes are accurate as of: 8/15/17  9:40 AM.  If you have any questions, ask your nurse or doctor.               Start taking these medicines.        Dose/Directions    nystatin-triamcinolone ointment   Commonly known as:  MYCOLOG   Used for:  Rash and nonspecific skin eruption   Started by:  Guadalupe Petersen MD        Apply topically 2 times daily   Quantity:  15 g   Refills:  1            Where to get your medicines      These medications were sent to [facility] [place] [id number] [facility]  [address] [place] [id number]     Phone:  [phone]     nystatin-triamcinolone ointment                Primary Care Provider    None Specified       No primary provider on file.        Equal Access to Services     TOM MIRELES : Hugo Haas, myron lopez, qayonny kaalgarcia [address]ne. So [facility] [phone].    ATENCIÓN: Si habla español, tiene a blankenship disposición servicios gratuitos de asistencia lingüística. Llame al [phone].    We comply with applicable federal civil rights laws and [place] laws. We do not discriminate on the basis of race, color, national origin, age, disability sex, sexual orientation or gender identity.            Thank you!     Thank you for choosing [facility] [place]  for your care. Our goal is always to provide you with excellent care. Hearing back from our patients is one way we can continue to improve our services. Please take a few minutes to complete the written survey that you may receive in the mail after your visit with us. Thank you!             Your Updated Medication List - Protect others around you: Learn how to safely use, store and throw away your medicines at www.disposemymeds.org.          This list is accurate as of: 8/15/17  9:40 AM.  Always use your most recent med list.                   Brand Name Dispense Instructions for use Diagnosis    FOLIC ACID PO      Take 1 mg by mouth daily        ibuprofen 400 MG tablet    ADVIL/MOTRIN    100 tablet    Take 1-2 tablets (400-800 mg) by mouth every 6 hours as needed for other (cramping)    Single liveborn infant delivered vaginally       nystatin-triamcinolone ointment    MYCOLOG    15 g    Apply topically 2 times daily    Rash and nonspecific skin eruption       prenatal multivitamin plus iron 27-0.8 MG Tabs per tablet      Take 1 tablet by mouth daily

## 2017-08-15 NOTE — NURSING NOTE
"Chief Complaint   Patient presents with     Post Partum Exam       Initial /76 (BP Location: Right arm, Patient Position: Chair, Cuff Size: Adult Regular)  Pulse 78  Temp 97.4  F (36.3  C) (Oral)  Ht 5' 1.25\" (1.556 m)  Wt 146 lb 9.6 oz (66.5 kg)  LMP 10/06/2016  Breastfeeding? No  BMI 27.47 kg/m2 Estimated body mass index is 27.47 kg/(m^2) as calculated from the following:    Height as of this encounter: 5' 1.25\" (1.556 m).    Weight as of this encounter: 146 lb 9.6 oz (66.5 kg).  Medication Reconciliation: complete   Ifrah Zuniga CMA      "

## 2018-08-23 ENCOUNTER — OFFICE VISIT (OUTPATIENT)
Dept: OBGYN | Facility: CLINIC | Age: 28
End: 2018-08-23
Payer: COMMERCIAL

## 2018-08-23 VITALS
SYSTOLIC BLOOD PRESSURE: 103 MMHG | WEIGHT: 136.2 LBS | DIASTOLIC BLOOD PRESSURE: 66 MMHG | HEART RATE: 74 BPM | BODY MASS INDEX: 25.53 KG/M2 | OXYGEN SATURATION: 99 %

## 2018-08-23 DIAGNOSIS — Z32.00 PREGNANCY EXAMINATION OR TEST, PREGNANCY UNCONFIRMED: Primary | ICD-10-CM

## 2018-08-23 DIAGNOSIS — N92.6 IRREGULAR MENSTRUAL CYCLE: ICD-10-CM

## 2018-08-23 LAB
B-HCG SERPL-ACNC: 43 IU/L (ref 0–5)
BETA HCG QUAL IFA URINE: NEGATIVE

## 2018-08-23 PROCEDURE — 99213 OFFICE O/P EST LOW 20 MIN: CPT | Performed by: NURSE PRACTITIONER

## 2018-08-23 PROCEDURE — 36415 COLL VENOUS BLD VENIPUNCTURE: CPT | Performed by: NURSE PRACTITIONER

## 2018-08-23 PROCEDURE — 84703 CHORIONIC GONADOTROPIN ASSAY: CPT | Performed by: NURSE PRACTITIONER

## 2018-08-23 PROCEDURE — 84702 CHORIONIC GONADOTROPIN TEST: CPT | Performed by: NURSE PRACTITIONER

## 2018-08-23 NOTE — PROGRESS NOTES
SUBJECTIVE:   Mona Torres is a 28 year old female who presents to clinic today for the following health issues:    Irregular menstrual bleeding and positive pregnancy test.  Patient had a  approximately 1 year ago. Once cycles resumed after delivery, they were regular. May 2 began having spotting daily through the entire month.  had a normal period flow for a few days and then all bleeding resolved.  or , began bleeding lightly again and on , passed a clot or piece of tissue-patient saved a picture on her phone. Was a round and somewhat elongated large clot that took up her entire palm.  Bled for a few days after that and then it all resolved. Bleeding again began . Had a positive pregnancy test x 2 this week. Denies any dizziness, heavy bleeding, cramping or pelvic pain, nausea, breast tenderness.  Not using contraception, pregnancy would be desired and welcome.  She is Rh positive    Problem list and histories reviewed & adjusted, as indicated.  Additional history: as documented    Patient Active Problem List   Diagnosis     Language barrier     Past Surgical History:   Procedure Laterality Date     NO HISTORY OF SURGERY         Social History   Substance Use Topics     Smoking status: Never Smoker     Smokeless tobacco: Never Used     Alcohol use No     Family History   Problem Relation Age of Onset     Hypertension Mother            Reviewed and updated as needed this visit by clinical staff  Tobacco  Allergies  Meds  Problems  Med Hx  Surg Hx  Fam Hx  Soc Hx        Reviewed and updated as needed this visit by Provider  Tobacco  Allergies  Meds  Problems  Med Hx  Surg Hx  Fam Hx  Soc Hx          ROS:  Constitutional, HEENT, cardiovascular, pulmonary, gi and gu systems are negative, except as otherwise noted.    OBJECTIVE:     /66  Pulse 74  Wt 136 lb 3.2 oz (61.8 kg)  SpO2 99%  Breastfeeding? No  BMI 25.53 kg/m2  Body mass index is 25.53 kg/(m^2).  GENERAL:  healthy, alert and no distress  RESP: lungs clear to auscultation - no rales, rhonchi or wheezes  CV: regular rate and rhythm, normal S1 S2, no S3 or S4, no murmur, click or rub, no peripheral edema and peripheral pulses strong  ABDOMEN: soft, nontender, no hepatosplenomegaly, no masses and bowel sounds normal  MS: no gross musculoskeletal defects noted, no edema  SKIN: no suspicious lesions or rashes  PSYCH: mentation appears normal, affect normal/bright    Diagnostic Test Results:  Results for orders placed or performed in visit on 08/23/18 (from the past 24 hour(s))   Beta HCG qual IFA urine   Result Value Ref Range    Beta HCG Qual IFA Urine Negative NEG^Negative          ASSESSMENT/PLAN:   1. Pregnancy examination or test, pregnancy unconfirmed  See below  - Beta HCG qual IFA urine  - HCG Quantitative Pregnancy, Blood ([id number])    2. Irregular menstrual cycle  Discussed negative UPT in clinic with patient and her . Reviewed her menstrual bleeding over the last few months. Plan Quant HCG today. If positive will plan to repeat in a few days to determine if it is rising or decreasing and then follow up accordingly.  If HCG negative today, consider pelvic ultrasound for evaluation of her bleeding. Warning signs to monitor for and report immediately discussed with patient and she verbalizes understanding. Patient is given an opportunity to ask questions and have them answered.  - HCG Quantitative Pregnancy, Blood ([id number])    SANTOS [facility]

## 2018-08-23 NOTE — MR AVS SNAPSHOT
After Visit Summary   8/23/2018    Mona Torres    MRN: [id number]           Patient Information     Date Of Birth          1990        Visit Information        Provider Department      8/23/2018 8:15 AM Fidelina Castro APRN CNP; PHONE,  [facility]        Today's Diagnoses     Pregnancy examination or test, pregnancy unconfirmed    -  1    Irregular menstrual cycle           Follow-ups after your visit        Your next 10 appointments already scheduled     Aug 28, 2018 11:30 AM CDT   SHORT with Sukumar Logan MD   [facility]    [address] [facility] [address] [phone]              Who to contact     If you have questions or need follow up information about today's clinic visit or your schedule please contact [facility] directly at [phone].  Normal or non-critical lab and imaging results will be communicated to you by MyChart, letter or phone within 4 business days after the clinic has received the results. If you do not hear from us within 7 days, please contact the clinic through MyChart or phone. If you have a critical or abnormal lab result, we will notify you by phone as soon as possible.  Submit refill requests through Cloopen or call your pharmacy and they will forward the refill request to us. Please allow 3 business days for your refill to be completed.          Additional Information About Your Visit        Care EveryWhere ID     This is your Care EveryWhere ID. This could be used by other organizations to access your [place] medical records  [id number]        Your Vitals Were     Pulse Pulse Oximetry Breastfeeding? BMI (Body Mass Index)          74 99% No 25.53 kg/m2         Blood Pressure from Last 3 Encounters:   08/23/18 103/66   08/15/17 110/76   07/06/17 104/51    Weight from Last 3 Encounters:   08/23/18 136 lb 3.2 oz (61.8 kg)   08/15/17 146 lb 9.6 oz (66.5 kg)    07/03/17 155 lb (70.3 kg)              We Performed the Following     Beta HCG qual IFA urine     HCG Quantitative Pregnancy, Blood ([id number])        Primary Care Provider Office Phone # Fax #    [facility] [phone] [address] [facility] [address]        Equal Access to Services     TOM MIRELES : Hadii aad ku hadasho Soomaali, waaxda luqadaha, qaybta kaalmada adeegyada, garcia samayoain petern ademay philipmiriam hernandez . So [facility] [phone].    ATENCIÓN: Si habla español, tiene a blankenship disposición servicios gratuitos de asistencia lingüística. Llame al [phone].    We comply with applicable federal civil rights laws and [place] laws. We do not discriminate on the basis of race, color, national origin, age, disability, sex, sexual orientation, or gender identity.            Thank you!     Thank you for choosing [facility]  for your care. Our goal is always to provide you with excellent care. Hearing back from our patients is one way we can continue to improve our services. Please take a few minutes to complete the written survey that you may receive in the mail after your visit with us. Thank you!             Your Updated Medication List - Protect others around you: Learn how to safely use, store and throw away your medicines at www.disposemymeds.org.          This list is accurate as of 8/23/18  9:08 AM.  Always use your most recent med list.                   Brand Name Dispense Instructions for use Diagnosis    FOLIC ACID PO      Take 1 mg by mouth daily        ibuprofen 400 MG tablet    ADVIL/MOTRIN    100 tablet    Take 1-2 tablets (400-800 mg) by mouth every 6 hours as needed for other (cramping)    Single liveborn infant delivered vaginally       nystatin-triamcinolone ointment    MYCOLOG    15 g    Apply topically 2 times daily    Rash and nonspecific skin eruption       prenatal multivitamin plus iron 27-0.8 MG Tabs per tablet      Take 1 tablet by mouth  daily

## 2018-08-24 ENCOUNTER — TELEPHONE (OUTPATIENT)
Dept: OBGYN | Facility: CLINIC | Age: 28
End: 2018-08-24

## 2018-08-24 NOTE — TELEPHONE ENCOUNTER
Notes Recorded by Fidelina aCstro APRN CNP on 8/24/2018 at 8:06 AM  Please call patient and notify her the HCG was positive, but low. As we had discussed at her visit, I would like to repeat this test-ideally tomorrow, but if she is not able to come to [place] or [place] tomorrow/Sunday, needs lab appointment Monday    Phone call placed to patient using [employer], client # [id number]. Gave results and orders per SANTOS Land, CNP. Patient agreed to follow plan. Scheduled for [place] lab on 08-25-18 at 1215. Maria Alejandra Mccain RN, BAN

## 2018-08-25 DIAGNOSIS — Z32.00 PREGNANCY EXAMINATION OR TEST, PREGNANCY UNCONFIRMED: ICD-10-CM

## 2018-08-25 PROCEDURE — 84702 CHORIONIC GONADOTROPIN TEST: CPT | Performed by: NURSE PRACTITIONER

## 2018-08-25 PROCEDURE — 36415 COLL VENOUS BLD VENIPUNCTURE: CPT | Performed by: NURSE PRACTITIONER

## 2018-08-27 ENCOUNTER — TELEPHONE (OUTPATIENT)
Dept: FAMILY MEDICINE | Facility: CLINIC | Age: 28
End: 2018-08-27

## 2018-08-27 LAB — B-HCG SERPL-ACNC: 37 IU/L (ref 0–5)

## 2018-08-27 NOTE — TELEPHONE ENCOUNTER
Notes Recorded by Fidelina Castro APRN CNP on 8/27/2018 at 1:17 PM  Please call patient and notify her that her pregnancy hormone level decreased from 43 to 37. Unfortunately, this is concerning for a pregnancy that is not viable/progressing normally as we discussed was a possibility at her visit. I would like to repeat this again in the next 1-2 days with a lab appointment and if it continues to drop, we should monitor it weekly until it is under 5.    Phone call placed to patient using [employer], client # [id number]. Gave results and orders per SANTOS Land CNP. This RN spent > 18 mins on the phone with patient attempting to explain to the best of my ability. Noted patient had an appt with Dr. Carson tomorrow morning for pregnancy test. Explained patient does not need to be seen by Dr. Logan but if she wants to see SANTOS Land CNP to discuss test results further, this RN can schedule. Patient said several times that she does not need anymore labs. Patient agreed to be seen by SANTOS Land CNP to discuss further and then have labs as ordered. Scheduled with SANTOS Land CNP at 1010 tomorrow.   Will route to SANTOS Land CNP as an FYI. Maria Alejandra Mccain RN, BAN

## 2018-08-28 ENCOUNTER — OFFICE VISIT (OUTPATIENT)
Dept: OBGYN | Facility: CLINIC | Age: 28
End: 2018-08-28
Payer: COMMERCIAL

## 2018-08-28 VITALS
SYSTOLIC BLOOD PRESSURE: 99 MMHG | HEIGHT: 62 IN | BODY MASS INDEX: 24.66 KG/M2 | HEART RATE: 81 BPM | WEIGHT: 134 LBS | OXYGEN SATURATION: 97 % | DIASTOLIC BLOOD PRESSURE: 68 MMHG | TEMPERATURE: 97.5 F

## 2018-08-28 DIAGNOSIS — O03.9 MISCARRIAGE: Primary | ICD-10-CM

## 2018-08-28 PROCEDURE — 99214 OFFICE O/P EST MOD 30 MIN: CPT | Performed by: NURSE PRACTITIONER

## 2018-08-28 PROCEDURE — 36415 COLL VENOUS BLD VENIPUNCTURE: CPT | Performed by: NURSE PRACTITIONER

## 2018-08-28 PROCEDURE — 84702 CHORIONIC GONADOTROPIN TEST: CPT | Performed by: NURSE PRACTITIONER

## 2018-08-28 ASSESSMENT — PAIN SCALES - GENERAL: PAINLEVEL: NO PAIN (0)

## 2018-08-28 NOTE — PROGRESS NOTES
"  SUBJECTIVE:   Mona Torres is a 28 year old female who presents to clinic today for the following health issu    Missed  follow up    Patient was seen last week for OB confirmation, urine HCG was negative. She had passed a large blood clot in early July, had some light bleeding for a few days and then again started bleeding 18 and has had some intermittent bleeding since then. Quant HCG was 43, repeated 2 days later and down to 37. Patient had many questions regarding results and appointment was scheduled.  Has had minimal spotting in the last few days. Denies camping, nausea, pelvic pain/cramping. She is Rh positive.     Via phone interpretor, patient's multiple questions reviewed including what if she could have done anything to prevent this miscarriage, if she can have another child, how she can prevent a miscarriage, when will her bleeding resolve, when can she start trying again, did dying her hair cause the miscarriage, should she start taking chinese herbal supplements to prevent miscarriage, etc.  She is Rh positive    Problem list and histories reviewed & adjusted, as indicated.  Additional history: as documented    Patient Active Problem List   Diagnosis     Language barrier     Past Surgical History:   Procedure Laterality Date     NO HISTORY OF SURGERY         Social History   Substance Use Topics     Smoking status: Never Smoker     Smokeless tobacco: Never Used     Alcohol use No     Family History   Problem Relation Age of Onset     Hypertension Mother            Reviewed and updated as needed this visit by clinical staff       Reviewed and updated as needed this visit by Provider         ROS:  Constitutional, HEENT, cardiovascular, pulmonary, gi and gu systems are negative, except as otherwise noted.    OBJECTIVE:     BP 99/68  Pulse 81  Temp 97.5  F (36.4  C) (Oral)  Ht 5' 2\" (1.575 m)  Wt 134 lb (60.8 kg)  SpO2 97%  BMI 24.51 kg/m2  Body mass index is 24.51 kg/(m^2).  GENERAL: " healthy, alert and no distress  RESP: lungs clear to auscultation - no rales, rhonchi or wheezes  CV: regular rate and rhythm, normal S1 S2, no S3 or S4, no murmur, click or rub, no peripheral edema and peripheral pulses strong  ABDOMEN: soft, nontender, no hepatosplenomegaly, no masses and bowel sounds normal   (female): declined by patient   MS: no gross musculoskeletal defects noted, no edema  SKIN: no suspicious lesions or rashes  PSYCH: mentation appears normal, affect normal/bright    ASSESSMENT/PLAN:   1. Miscarriage  Via phone interpretor, we had a lengthy discussion regarding all her questions. They were reviewed until they were all answered to her understanding. Patient reassured there were nothing she did to cause this or could have done to prevent this. Discussed miscarriage rate, likely causes for first trimester miscarriage. Check HCG today and if it continues decreasing, check weekly until normal. Until then we discussed pelvic rest, when she can start trying again.  Warning signs to monitor for and report such as heavy vaginal bleeding, abdominal pain discussed and patient verbalizes understand. Patient is given an opportunity to ask questions and have them answered. To call if her bleeding persists after her HCG is normal or if it becomes heavy at any time and then consider imaging if appropriate.  - HCG quantitative pregnancy    Approximately 25 minutes face to face time was spent with the patient today with greater than 50% spent in education and counseling regarding miscarriage.    SANTOS [facility]

## 2018-08-28 NOTE — NURSING NOTE
"Chief Complaint   Patient presents with     Miscarriage       Initial BP 99/68  Pulse 81  Temp 97.5  F (36.4  C) (Oral)  Ht 5' 2\" (1.575 m)  Wt 134 lb (60.8 kg)  SpO2 97%  BMI 24.51 kg/m2 Estimated body mass index is 24.51 kg/(m^2) as calculated from the following:    Height as of this encounter: 5' 2\" (1.575 m).    Weight as of this encounter: 134 lb (60.8 kg)..  BP completed using cuff size: stan Emmanuel CMA    "

## 2018-08-28 NOTE — MR AVS SNAPSHOT
"              After Visit Summary   8/28/2018    Mona Torres    MRN: [id number]           Patient Information     Date Of Birth          1990        Visit Information        Provider Department      8/28/2018 10:00 AM Fidelina Castro APRN CNP; PHONE,  [facility]        Today's Diagnoses     Miscarriage    -  1       Follow-ups after your visit        Who to contact     If you have questions or need follow up information about today's clinic visit or your schedule please contact [facility] directly at [phone].  Normal or non-critical lab and imaging results will be communicated to you by MyChart, letter or phone within 4 business days after the clinic has received the results. If you do not hear from us within 7 days, please contact the clinic through MyChart or phone. If you have a critical or abnormal lab result, we will notify you by phone as soon as possible.  Submit refill requests through [employer] or call your pharmacy and they will forward the refill request to us. Please allow 3 business days for your refill to be completed.          Additional Information About Your Visit        Care EveryWhere ID     This is your Care EveryWhere ID. This could be used by other organizations to access your [place] medical records  [id number]        Your Vitals Were     Pulse Temperature Height Pulse Oximetry BMI (Body Mass Index)       81 97.5  F (36.4  C) (Oral) 5' 2\" (1.575 m) 97% 24.51 kg/m2        Blood Pressure from Last 3 Encounters:   08/28/18 99/68   08/23/18 103/66   08/15/17 110/76    Weight from Last 3 Encounters:   08/28/18 134 lb (60.8 kg)   08/23/18 136 lb 3.2 oz (61.8 kg)   08/15/17 146 lb 9.6 oz (66.5 kg)              We Performed the Following     HCG quantitative pregnancy          Today's Medication Changes          These changes are accurate as of 8/28/18 10:53 AM.  If you have any questions, ask your nurse or doctor.               Stop taking these " medicines if you haven't already. Please contact your care team if you have questions.     FOLIC ACID PO   Stopped by:  Fidelina Castro APRN CNP           ibuprofen 400 MG tablet   Commonly known as:  ADVIL/MOTRIN   Stopped by:  Fidelina Castro APRN CNP           nystatin-triamcinolone ointment   Commonly known as:  MYCOLOG   Stopped by:  Fidelina Castro APRN CNP           prenatal multivitamin plus iron 27-0.8 MG Tabs per tablet   Stopped by:  Fidelina Castro APRN CNP                    Primary Care Provider Office Phone # Fax #    [facility] [phone] [address] [facility] [address]        Equal Access to Services     TOM MIRELES : Hugo persaudo Waldo, waaxda luqadaha, qaybta kaalmada adeegyada, garcia hernandez . So [facility] [phone].    ATENCIÓN: Si habla español, tiene a blankenship disposición servicios gratuitos de asistencia lingüística. [facility] [phone].    We comply with applicable federal civil rights laws and [place] laws. We do not discriminate on the basis of race, color, national origin, age, disability, sex, sexual orientation, or gender identity.            Thank you!     Thank you for choosing [facility]  for your care. Our goal is always to provide you with excellent care. Hearing back from our patients is one way we can continue to improve our services. Please take a few minutes to complete the written survey that you may receive in the mail after your visit with us. Thank you!             Your Updated Medication List - Protect others around you: Learn how to safely use, store and throw away your medicines at www.disposemymeds.org.      Notice  As of 8/28/2018 10:53 AM    You have not been prescribed any medications.

## 2018-08-29 LAB — B-HCG SERPL-ACNC: 34 IU/L (ref 0–5)

## 2018-09-05 ENCOUNTER — TELEPHONE (OUTPATIENT)
Dept: OBGYN | Facility: CLINIC | Age: 28
End: 2018-09-05

## 2018-09-05 DIAGNOSIS — O02.1 MISSED AB: Primary | ICD-10-CM

## 2018-09-05 DIAGNOSIS — Z32.00 PREGNANCY EXAMINATION OR TEST, PREGNANCY UNCONFIRMED: ICD-10-CM

## 2018-09-05 LAB — B-HCG SERPL-ACNC: 31 IU/L (ref 0–5)

## 2018-09-05 PROCEDURE — 36415 COLL VENOUS BLD VENIPUNCTURE: CPT | Performed by: NURSE PRACTITIONER

## 2018-09-05 PROCEDURE — 84702 CHORIONIC GONADOTROPIN TEST: CPT | Performed by: NURSE PRACTITIONER

## 2018-09-05 NOTE — TELEPHONE ENCOUNTER
Phone call placed to   [phone].  Ultrasound appointment scheduled for Sept 11th at 0800 per pt's request.  Pt requesting  to be on site.  Informed pt that we were not sure one would be on site but one will be available either via phone or on site. Pt informed to arrive with a full bladder. Pt verbalizes understanding via .  Appointment scheduled with SANTOS Land, CNP on Sept 13th at 0850. Both appointments scheduled for [place] [facility]. Pt verbalizes understanding via . Yesenia Araiza RN on 9/5/2018 at 4:11 PM

## 2018-09-11 ENCOUNTER — RADIANT APPOINTMENT (OUTPATIENT)
Dept: ULTRASOUND IMAGING | Facility: CLINIC | Age: 28
End: 2018-09-11
Attending: NURSE PRACTITIONER
Payer: COMMERCIAL

## 2018-09-11 ENCOUNTER — TELEPHONE (OUTPATIENT)
Dept: OBGYN | Facility: CLINIC | Age: 28
End: 2018-09-11

## 2018-09-11 DIAGNOSIS — O02.1 MISSED AB: ICD-10-CM

## 2018-09-11 LAB — RADIOLOGIST FLAGS: ABNORMAL

## 2018-09-11 PROCEDURE — 76801 OB US < 14 WKS SINGLE FETUS: CPT

## 2018-09-11 NOTE — TELEPHONE ENCOUNTER
Telephone call from Dr. Dougherty in Radiology with patient's ultrasound results-presence of right adnexal mass. Appears separate from ovary. Concerning for a possible right ectopic pregnancy. Nothing seen in the uterus.    Patient's HCG results have been slowly dropping. Last was 31.     Telephone call to patient using  interpretor services-[facility] interpretor. Explained her ultrasound results, reviewed her slowly decreasing HCG levels. Denies current pain or vaginal bleeding. Patient had multiple questions and these were answered for her.  Recommend we move up her follow up appointment to today, can work her in, but patient does not have transportation today. Requests appointment with MD tomorrow. We discussed follow up for possible ectopic pregnancy-patient cannot come in today and prefers to meet with provider before proceeding with any treatment or intervention. Unable to even come in for repeat HCG today.   If she does have transportation that becomes available today, can call and we can try to work her in. We reviewed all precautions to monitor for and follow. We reviewed symptoms for which she would need to proceed to ED immediately. Questions were answered and instructed to call back if she has further questions. Fidelina GRAHAM CNP

## 2018-09-12 ENCOUNTER — OFFICE VISIT (OUTPATIENT)
Dept: OBGYN | Facility: CLINIC | Age: 28
End: 2018-09-12
Payer: COMMERCIAL

## 2018-09-12 VITALS
DIASTOLIC BLOOD PRESSURE: 67 MMHG | BODY MASS INDEX: 24.87 KG/M2 | HEART RATE: 89 BPM | SYSTOLIC BLOOD PRESSURE: 101 MMHG | OXYGEN SATURATION: 97 % | WEIGHT: 136 LBS

## 2018-09-12 DIAGNOSIS — Z23 NEED FOR PROPHYLACTIC VACCINATION AND INOCULATION AGAINST INFLUENZA: ICD-10-CM

## 2018-09-12 DIAGNOSIS — O00.90 ECTOPIC PREGNANCY WITHOUT INTRAUTERINE PREGNANCY, UNSPECIFIED LOCATION: Primary | ICD-10-CM

## 2018-09-12 DIAGNOSIS — O03.9 MISCARRIAGE: ICD-10-CM

## 2018-09-12 LAB — B-HCG SERPL-ACNC: 20 IU/L (ref 0–5)

## 2018-09-12 PROCEDURE — 90471 IMMUNIZATION ADMIN: CPT | Performed by: OBSTETRICS & GYNECOLOGY

## 2018-09-12 PROCEDURE — 36415 COLL VENOUS BLD VENIPUNCTURE: CPT | Performed by: OBSTETRICS & GYNECOLOGY

## 2018-09-12 PROCEDURE — 99214 OFFICE O/P EST MOD 30 MIN: CPT | Mod: 25 | Performed by: OBSTETRICS & GYNECOLOGY

## 2018-09-12 PROCEDURE — 84702 CHORIONIC GONADOTROPIN TEST: CPT | Performed by: OBSTETRICS & GYNECOLOGY

## 2018-09-12 PROCEDURE — 90686 IIV4 VACC NO PRSV 0.5 ML IM: CPT | Performed by: OBSTETRICS & GYNECOLOGY

## 2018-09-12 NOTE — MR AVS SNAPSHOT
After Visit Summary   9/12/2018    Mona Torres    MRN: [id number]           Patient Information     Date Of Birth          1990        Visit Information        Provider Department      9/12/2018 9:15 AM Ronald Caballero MD; LANGUAGE [facility]        Today's Diagnoses     Ectopic pregnancy without intrauterine pregnancy, unspecified location    -  1       Follow-ups after your visit        Your next 10 appointments already scheduled     Sep 19, 2018  9:15 AM CDT   [facility] with [facility] LAB   [facility]    [address] [facility] [address]   [phone]           Please do not eat 10-12 hours before your appointment if you are coming in fasting for labs on lipids, cholesterol, or glucose (sugar). This does not apply to pregnant women. Water, hot tea and black coffee (with nothing added) are okay. Do not drink other fluids, diet soda or chew gum.            Sep 26, 2018  9:30 AM CDT   LAB with AN LAB   [facility]    [address] [facility] [address]   [phone]           Please do not eat 10-12 hours before your appointment if you are coming in fasting for labs on lipids, cholesterol, or glucose (sugar). This does not apply to pregnant women. Water, hot tea and black coffee (with nothing added) are okay. Do not drink other fluids, diet soda or chew gum.            Sep 28, 2018  8:30 AM CDT   Office Visit with Ronald Caballero MD   [facility] ([facility])    [address] [place] [address]   [phone]           Bring a current list of meds and any records pertaining to this visit. For Physicals, please bring immunization records and any forms needing to be filled out. Please arrive 10 minutes early to complete paperwork.              Future tests that were ordered for you today     Open Standing Orders        Priority Remaining  Interval Expires Ordered    HCG quantitative pregnancy Routine 6/6 weekly 9/12/2019 9/12/2018            Who to contact     If you have questions or need follow up information about today's clinic visit or your schedule please contact [facility] directly at [phone].  Normal or non-critical lab and imaging results will be communicated to you by MyChart, letter or phone within 4 business days after the clinic has received the results. If you do not hear from us within 7 days, please contact the clinic through MyChart or phone. If you have a critical or abnormal lab result, we will notify you by phone as soon as possible.  Submit refill requests through [employer] or call your pharmacy and they will forward the refill request to us. Please allow 3 business days for your refill to be completed.          Additional Information About Your Visit        Care EveryWhere ID     This is your Care EveryWhere ID. This could be used by other organizations to access your [place] medical records  [id number]        Your Vitals Were     Pulse Pulse Oximetry BMI (Body Mass Index)             89 97% 24.87 kg/m2          Blood Pressure from Last 3 Encounters:   09/12/18 101/67   08/28/18 99/68   08/23/18 103/66    Weight from Last 3 Encounters:   09/12/18 136 lb (61.7 kg)   08/28/18 134 lb (60.8 kg)   08/23/18 136 lb 3.2 oz (61.8 kg)              We Performed the Following     HCG quantitative pregnancy        Primary Care Provider Office Phone # Fax #    Fidelina SANTOS [facility] [phone] [address] [facility] [address]        Equal Access to Services     [facility]: Hadii titus lua hadasho Soalda, waaxda luqadaha, qaybta kaalmada garcia goodman . So [facility] [phone].    ATENCIÓN: Si habla español, tiene a blankenship disposición servicios gratuitos de asistencia lingüística. Llame al [phone].    We comply with applicable federal civil rights laws  and [place] laws. We do not discriminate on the basis of race, color, national origin, age, disability, sex, sexual orientation, or gender identity.            Thank you!     Thank you for choosing [facility]  for your care. Our goal is always to provide you with excellent care. Hearing back from our patients is one way we can continue to improve our services. Please take a few minutes to complete the written survey that you may receive in the mail after your visit with us. Thank you!             Your Updated Medication List - Protect others around you: Learn how to safely use, store and throw away your medicines at www.disposemymeds.org.      Notice  As of 9/12/2018 10:50 AM    You have not been prescribed any medications.

## 2018-09-12 NOTE — PROGRESS NOTES

## 2018-09-12 NOTE — PROGRESS NOTES
Mona Torres is a 28 year old female, . She presents today for possible ectopic pregnancy with her  (who is also interpreting).  She had some bleeding 2018, followed by intermittent bleeding since then.   She had quant HCG levels checked and the quant levels have been low with modest decrease in the levels.  She has not had any pelvic pain.  She has not passed any tissue, to her knowledge.   She had the pelvic ultrasound yesterday and she was told that she had an ectopic pregnancy.      The patient and I reviewed the following ultrasound report and the associated films.     ULTRASOUND OBSTETRIC LESS THAN 14 WEEKS SINGLE 2018 8:44 AM  HISTORY: Persistent low HCG levels.  TECHNIQUE: Transabdominal imaging only was performed. The patient refused transvaginal imaging. A telephone  was used during the exam.  COMPARISON: None.  FINDINGS: No intrauterine gestational sac is identified. No embryonic pole or embryonic cardiac activity is identified. The endometrial stripe measures 0.9 cm, and is within normal limits for a premenopausal patient. The ovaries are unremarkable. Blood flow is identified within both ovaries. There is a heterogeneous right adnexal mass measuring 4.5 x 4.8 x 4.3 cm, which appears to be separate from the right ovary. No free fluid is identified within the pelvis.  IMPRESSION:   1. No intrauterine pregnancy is identified.   2. A 4.8 cm heterogeneous right adnexal mass appears to be separate from the right ovary. Findings are considered suspicious for an ectopic pregnancy. Please clinically correlate. An additional differential consideration would be a spontaneous  in progress.      She has had low quant HCG levels.  Her quant HCG levels are as noted:    Component      Latest Ref Rng & Units 2018   HCG Quantitative Serum      0 - 5 IU/L 43 (H) 37 (H) 34 (H) 31 (H)       I attempted to reviewed the ultrasoud films with past  films.   The past pregnancy's ultrasound films are not able to be reviewed, but the report has been scanned into the EPIC chart.  The 20 week pregnancy films do not show any findings, but the 7 week ultrasound from that pregnancy reports a 3 cm posterior uterine fibroid.        Past Medical History:   Diagnosis Date     NO ACTIVE PROBLEMS        Past Surgical History:   Procedure Laterality Date     NO HISTORY OF SURGERY         Obstetric History       T1      L1     SAB1   TAB0   Ectopic0   Multiple0   Live Births1       # Outcome Date GA Lbr Juan/2nd Weight Sex Delivery Anes PTL Lv   2 SAB 18           1 Term 17 38w5d 02:27 :57 6 lb 10.2 oz (3.01 kg) F Vag-Spont [facility],Local N [place]      Name: JASON,BABYTara VIKRAM      Complications: Other Excessive Bleeding,Prolonged PROM (>18 hours)      Apgar1:  8                Apgar5: 9           No Known Allergies      No current outpatient prescriptions on file.       Social History     Social History     Marital status:      Spouse name: N/A     Number of children: 1     Years of education: N/A     Occupational History     Not on file.     Social History Main Topics     Smoking status: Never Smoker     Smokeless tobacco: Never Used     Alcohol use No     Drug use: No     Sexual activity: Yes     Partners: Male     Other Topics Concern     Not on file     Social History Narrative       Family History   Problem Relation Age of Onset     Hypertension Mother        Review of Systems:  10 point ROS of systems including Constitutional, Eyes, Respiratory, Cardiovascular, Gastroenterology, Genitourinary, Integumentary, Muscularskeletal, Psychiatric were all negative except for pertinent positives noted in my HPI and in the PMH.      Exam  /67 (BP Location: Right arm, Cuff Size: Adult Regular)  Pulse 89  Wt 136 lb (61.7 kg)  LMP 2018  SpO2 97%  BMI 24.87 kg/m2  General:  WNWD female, NAD  Alert  Oriented x 3  Gait:  Normal  Skin:   Normal skin turgor  HEENT:  NC/AT, EOMI  Abdomen:  Non-tender, non-distended.  Pelvic exam:  Not performed  Extremities:  No clubbing, no cyanosis and no edema.      Assessment  Miscarriage, uncertain type  Possible ectopic pregnancy      Plan  We discussed the ultrasound result and the quant HCG levels.  It is possible that she has an ectopic pregnancy, but the complex ovarian cystic structure might also be a corpus luteal cyst.   She has not had any pelvic pain.  She does not have known risk factors that have been associated with ectopic pregnancies.   With the quant HCG levels decreasing slowly, it is an option to watch the symptoms without surgical intervention.  We reviewed the medical and surgical managements of ectopic pregnancies.  We also reviewed the possible continued observational management and then intervene, if symptoms or if quants increase.   The quant level is low, so it is possible that the D&C and the laparoscopy might not show trophoblastic tissue with pathology evaluation.  An additional ultrasound might not show any changes from this past ultrasound (based on slowly decreasing hcg values).  We reviewed the associated risks and benefits and goals and limitations.   We reviewed that miscarriage occurs ~ 1 in 5 pregnancy events and that there was no direct event or prevention  that the patient could have avoided or performed.  Discussed that there are many etiologies for miscarriage, the most common being a genetic anomaly.  Reviewed that risk of miscarriage for next pregnancy is not elevated by the current event.  After the discussion, she has elected to proceed with observational management.  Ectopic pregnancy precautions reviewed.    Quant HCG levels ordered for future lab draws.   RTC 2 weeks for follow up, sooner if symptoms.     TT 30-35 min  CT and review of records, greater than 80%, as noted above in the HPI and in the Plan.     Ronald Caballero MD

## 2018-09-19 DIAGNOSIS — O00.90 ECTOPIC PREGNANCY WITHOUT INTRAUTERINE PREGNANCY, UNSPECIFIED LOCATION: ICD-10-CM

## 2018-09-19 LAB — B-HCG SERPL-ACNC: 11 IU/L (ref 0–5)

## 2018-09-19 PROCEDURE — 84702 CHORIONIC GONADOTROPIN TEST: CPT | Performed by: OBSTETRICS & GYNECOLOGY

## 2018-09-19 PROCEDURE — 36415 COLL VENOUS BLD VENIPUNCTURE: CPT | Performed by: OBSTETRICS & GYNECOLOGY

## 2018-09-26 DIAGNOSIS — O00.90 ECTOPIC PREGNANCY WITHOUT INTRAUTERINE PREGNANCY, UNSPECIFIED LOCATION: ICD-10-CM

## 2018-09-26 LAB — B-HCG SERPL-ACNC: 6 IU/L (ref 0–5)

## 2018-09-26 PROCEDURE — 36415 COLL VENOUS BLD VENIPUNCTURE: CPT | Performed by: OBSTETRICS & GYNECOLOGY

## 2018-09-26 PROCEDURE — 84702 CHORIONIC GONADOTROPIN TEST: CPT | Performed by: OBSTETRICS & GYNECOLOGY

## 2018-09-28 ENCOUNTER — OFFICE VISIT (OUTPATIENT)
Dept: OBGYN | Facility: CLINIC | Age: 28
End: 2018-09-28
Payer: COMMERCIAL

## 2018-09-28 VITALS
HEART RATE: 81 BPM | DIASTOLIC BLOOD PRESSURE: 70 MMHG | OXYGEN SATURATION: 98 % | BODY MASS INDEX: 25.17 KG/M2 | SYSTOLIC BLOOD PRESSURE: 106 MMHG | WEIGHT: 137.6 LBS

## 2018-09-28 DIAGNOSIS — O03.9 COMPLETE ABORTION: Primary | ICD-10-CM

## 2018-09-28 LAB — BETA HCG QUAL IFA URINE: NEGATIVE

## 2018-09-28 PROCEDURE — 99213 OFFICE O/P EST LOW 20 MIN: CPT | Performed by: OBSTETRICS & GYNECOLOGY

## 2018-09-28 PROCEDURE — 84703 CHORIONIC GONADOTROPIN ASSAY: CPT | Performed by: OBSTETRICS & GYNECOLOGY

## 2018-09-28 NOTE — MR AVS SNAPSHOT
After Visit Summary   2018    Mona Torres    MRN: [id number]           Patient Information     Date Of Birth          1990        Visit Information        Provider Department      2018 8:15 AM Ronald Caballero MD; L[facility]        Today's Diagnoses     Complete     -  1       Follow-ups after your visit        Follow-up notes from your care team     Return if symptoms worsen or fail to improve.      Who to contact     If you have questions or need follow up information about today's clinic visit or your schedule please contact [facility] directly at [phone].  Normal or non-critical lab and imaging results will be communicated to you by MyChart, letter or phone within 4 business days after the clinic has received the results. If you do not hear from us within 7 days, please contact the clinic through MyChart or phone. If you have a critical or abnormal lab result, we will notify you by phone as soon as possible.  Submit refill requests through [facility] or call your pharmacy and they will forward the refill request to us. Please allow 3 business days for your refill to be completed.          Additional Information About Your Visit        Care EveryWhere ID     This is your Care EveryWhere ID. This could be used by other organizations to access your [facility] medical records  [id number]        Your Vitals Were     Pulse Last Period Pulse Oximetry BMI (Body Mass Index)          81 2018 98% 25.17 kg/m2         Blood Pressure from Last 3 Encounters:   18 106/70   18 101/67   18 99/68    Weight from Last 3 Encounters:   18 137 lb 9.6 oz (62.4 kg)   18 136 lb (61.7 kg)   18 134 lb (60.8 kg)              We Performed the Following     Beta HCG qual IFA urine        Primary Care Provider Office Phone # Fax #    SANTOS Love [facility] [phone] [address] [address]  [facility] [address]        Equal Access to Services     [facility] : Hadii aad ku hadelisaleena Haas, wastevenalexus lopez, brittagarcia vincent. So [facility] [phone].    ATENCIÓN: Si habla español, tiene a blankenship disposición servicios gratuitos de asistencia lingüística. Llame al [phone].    We comply with applicable federal civil rights laws and [place] laws. We do not discriminate on the basis of race, color, national origin, age, disability, sex, sexual orientation, or gender identity.            Thank you!     Thank you for choosing [facility]  for your care. Our goal is always to provide you with excellent care. Hearing back from our patients is one way we can continue to improve our services. Please take a few minutes to complete the written survey that you may receive in the mail after your visit with us. Thank you!             Your Updated Medication List - Protect others around you: Learn how to safely use, store and throw away your medicines at www.disposemymeds.org.      Notice  As of 9/28/2018 11:59 PM    You have not been prescribed any medications.

## 2018-10-29 NOTE — PROGRESS NOTES
Mona Rosario is a 28 year old female, .  She presents today for follow up of possible ectopic or miscarriage.  She has had quant HCG values checked and they have been decreasing.  Her last quant on 2018 was 6.  She has not had any pelvic pain.  She has not passed tissue.    Her quant HCG are reviewed.     Component      Latest Ref Rng & Units 7/3/2017 2018 2018 2018   ABO       O      RH(D)       Pos      Specimen Expires       2017      HCG Quantitative Serum      0 - 5 IU/L  43 (H) 37 (H) 34 (H)     Component      Latest Ref Rng & Units 2018   ABO             RH(D)             Specimen Expires             HCG Quantitative Serum      0 - 5 IU/L 31 (H) 20 (H) 11 (H) 6 (H)     The patient's medical history, social history and family history are reviewed and no updates at this time.      Review of Systems:  10 point ROS of systems including Constitutional, Eyes, Respiratory, Cardiovascular, Gastroenterology, Genitourinary, Integumentary, Muscularskeletal, Psychiatric were all negative except for pertinent positives noted in my HPI and in the PMH.    Exam  /70 (BP Location: Right arm, Cuff Size: Adult Regular)  Pulse 81  Wt 137 lb 9.6 oz (62.4 kg)  LMP 2018  SpO2 98%  BMI 25.17 kg/m2  General:  WNWD female, NAD  Alert  Oriented x 3  Gait:  Normal  Skin:  Normal skin turgor  HEENT:  NC/AT, EOMI  Abdomen:  Non-tender, non-distended.  Pelvic exam:  Not performed  Extremities:  No clubbing, no cyanosis and no edema.      UPT today is negative      Assessment  Complete miscarriage.       Plan  The upt is ordered for today as she has not used a home pregnancy test.  The urine pregnancy test today is negative.   We reviewed information again, that we reviewed previously.  Miscarriages occurs ~ 1 in 5 pregnancy events and that there was no direct event or prevention  that the patient could have avoided or performed.  Discussed that there are many  etiologies for miscarriage, the most common being a genetic anomaly.  Reviewed that risk of miscarriage for next pregnancy is not elevated by the current event.  I would advise to wait 2 cycles before attempting pregnancy.  She might need longer for emotional healing.    Continue with folic acid supplementation (or prenatal vitamins), to help reduce NTD.       Ronald Caballero MD

## 2019-02-11 ENCOUNTER — OFFICE VISIT (OUTPATIENT)
Dept: FAMILY MEDICINE | Facility: CLINIC | Age: 29
End: 2019-02-11
Payer: COMMERCIAL

## 2019-02-11 VITALS
DIASTOLIC BLOOD PRESSURE: 64 MMHG | WEIGHT: 134 LBS | OXYGEN SATURATION: 99 % | HEART RATE: 79 BPM | TEMPERATURE: 98.1 F | SYSTOLIC BLOOD PRESSURE: 103 MMHG | RESPIRATION RATE: 18 BRPM | BODY MASS INDEX: 24.51 KG/M2

## 2019-02-11 DIAGNOSIS — N91.2 AMENORRHEA: Primary | ICD-10-CM

## 2019-02-11 LAB — BETA HCG QUAL IFA URINE: POSITIVE

## 2019-02-11 PROCEDURE — 84703 CHORIONIC GONADOTROPIN ASSAY: CPT | Performed by: FAMILY MEDICINE

## 2019-02-11 PROCEDURE — 99214 OFFICE O/P EST MOD 30 MIN: CPT | Performed by: FAMILY MEDICINE

## 2019-02-11 ASSESSMENT — PAIN SCALES - GENERAL: PAINLEVEL: NO PAIN (0)

## 2019-02-11 NOTE — PROGRESS NOTES
SUBJECTIVE:  28 year old.The patient has a history of    Patient's last menstrual period was 2018 (exact date). not sure of exact date    Periods are regular Patient is unsure of date.  Last bcp was more than a year.  OBJECTIVE:  no apparent distress  /64   Pulse 79   Temp 98.1  F (36.7  C) (Oral)   Resp 18   Wt 60.8 kg (134 lb)   LMP 2018 (Exact Date)   SpO2 99%   Breastfeeding? No   BMI 24.51 kg/m     preg test is pos  ASSESSMENT:   Pregnancy  PLAN: disccussed cats, travel,work, wt gain,medications, call messeges, nutrition, genetic testing and prenatal visit.  Re check  First ob   Over  half of theTotal time 25 minutes spent in cordination care. Discussed diagnoses, prognoses and treatment.

## 2019-02-11 NOTE — NURSING NOTE
"Chief Complaint   Patient presents with     Confirmation Of Pregnancy       Initial /64   Pulse 79   Temp 98.1  F (36.7  C) (Oral)   Resp 18   Wt 60.8 kg (134 lb)   LMP 09/04/2018   SpO2 99%   BMI 24.51 kg/m   Estimated body mass index is 24.51 kg/m  as calculated from the following:    Height as of 8/28/18: 1.575 m (5' 2\").    Weight as of this encounter: 60.8 kg (134 lb).  Medication Reconciliation: complete  Joy Calle M.A.    "

## 2019-02-11 NOTE — LETTER
[facility]        Positive Pregnancy Test Confirmation     Date: 2/11/2019    Name: Kyra Torres  [address] [place] [place] [facility] [address] [phone] (home)   YOB: 1990    Patient's last menstrual period was 12/26/2018 (exact date).     Date of Conception: 1/9/2018    First Day of the 3rd Trimester: 7/8/2019    Expected Date of Delivery: 10/3/219    Date of Positive Pregnancy Test:  No results found for: PREGTEST positive        Provider Signature:                                                                                               Sukumar Logan MD

## 2019-03-11 ENCOUNTER — PRENATAL OFFICE VISIT (OUTPATIENT)
Dept: OBGYN | Facility: CLINIC | Age: 29
End: 2019-03-11
Payer: COMMERCIAL

## 2019-03-11 ENCOUNTER — ANCILLARY PROCEDURE (OUTPATIENT)
Dept: ULTRASOUND IMAGING | Facility: CLINIC | Age: 29
End: 2019-03-11
Attending: NURSE PRACTITIONER
Payer: COMMERCIAL

## 2019-03-11 VITALS
HEART RATE: 95 BPM | WEIGHT: 132 LBS | OXYGEN SATURATION: 98 % | DIASTOLIC BLOOD PRESSURE: 72 MMHG | BODY MASS INDEX: 24.14 KG/M2 | TEMPERATURE: 97.6 F | SYSTOLIC BLOOD PRESSURE: 109 MMHG

## 2019-03-11 DIAGNOSIS — Z34.80 SUPERVISION OF OTHER NORMAL PREGNANCY, ANTEPARTUM: ICD-10-CM

## 2019-03-11 LAB
ABO + RH BLD: NORMAL
ABO + RH BLD: NORMAL
BASOPHILS # BLD AUTO: 0 10E9/L (ref 0–0.2)
BASOPHILS NFR BLD AUTO: 0.1 %
BLD GP AB SCN SERPL QL: NORMAL
BLOOD BANK CMNT PATIENT-IMP: NORMAL
DIFFERENTIAL METHOD BLD: NORMAL
EOSINOPHIL # BLD AUTO: 0.1 10E9/L (ref 0–0.7)
EOSINOPHIL NFR BLD AUTO: 0.6 %
ERYTHROCYTE [DISTWIDTH] IN BLOOD BY AUTOMATED COUNT: 13.3 % (ref 10–15)
HBV SURFACE AG SERPL QL IA: NONREACTIVE
HCT VFR BLD AUTO: 43.5 % (ref 35–47)
HGB BLD-MCNC: 14.9 G/DL (ref 11.7–15.7)
HIV 1+2 AB+HIV1 P24 AG SERPL QL IA: NONREACTIVE
LYMPHOCYTES # BLD AUTO: 1.7 10E9/L (ref 0.8–5.3)
LYMPHOCYTES NFR BLD AUTO: 21.8 %
MCH RBC QN AUTO: 28.9 PG (ref 26.5–33)
MCHC RBC AUTO-ENTMCNC: 34.3 G/DL (ref 31.5–36.5)
MCV RBC AUTO: 84 FL (ref 78–100)
MONOCYTES # BLD AUTO: 0.4 10E9/L (ref 0–1.3)
MONOCYTES NFR BLD AUTO: 5 %
NEUTROPHILS # BLD AUTO: 5.8 10E9/L (ref 1.6–8.3)
NEUTROPHILS NFR BLD AUTO: 72.5 %
PLATELET # BLD AUTO: 292 10E9/L (ref 150–450)
RBC # BLD AUTO: 5.16 10E12/L (ref 3.8–5.2)
RUBV IGG SERPL IA-ACNC: 26 IU/ML
SPECIMEN EXP DATE BLD: NORMAL
T PALLIDUM AB SER QL: NONREACTIVE
WBC # BLD AUTO: 8 10E9/L (ref 4–11)

## 2019-03-11 PROCEDURE — 86901 BLOOD TYPING SEROLOGIC RH(D): CPT | Performed by: NURSE PRACTITIONER

## 2019-03-11 PROCEDURE — 86850 RBC ANTIBODY SCREEN: CPT | Performed by: NURSE PRACTITIONER

## 2019-03-11 PROCEDURE — 87491 CHLMYD TRACH DNA AMP PROBE: CPT | Performed by: NURSE PRACTITIONER

## 2019-03-11 PROCEDURE — 99213 OFFICE O/P EST LOW 20 MIN: CPT | Performed by: NURSE PRACTITIONER

## 2019-03-11 PROCEDURE — 85025 COMPLETE CBC W/AUTO DIFF WBC: CPT | Performed by: NURSE PRACTITIONER

## 2019-03-11 PROCEDURE — 0064U ANTB TP TOTAL&RPR IA QUAL: CPT | Performed by: NURSE PRACTITIONER

## 2019-03-11 PROCEDURE — 86900 BLOOD TYPING SEROLOGIC ABO: CPT | Performed by: NURSE PRACTITIONER

## 2019-03-11 PROCEDURE — 36415 COLL VENOUS BLD VENIPUNCTURE: CPT | Performed by: NURSE PRACTITIONER

## 2019-03-11 PROCEDURE — 87591 N.GONORRHOEAE DNA AMP PROB: CPT | Performed by: NURSE PRACTITIONER

## 2019-03-11 PROCEDURE — 86762 RUBELLA ANTIBODY: CPT | Performed by: NURSE PRACTITIONER

## 2019-03-11 PROCEDURE — 87389 HIV-1 AG W/HIV-1&-2 AB AG IA: CPT | Performed by: NURSE PRACTITIONER

## 2019-03-11 PROCEDURE — 87086 URINE CULTURE/COLONY COUNT: CPT | Performed by: NURSE PRACTITIONER

## 2019-03-11 PROCEDURE — G0499 HEPB SCREEN HIGH RISK INDIV: HCPCS | Performed by: NURSE PRACTITIONER

## 2019-03-11 PROCEDURE — 76801 OB US < 14 WKS SINGLE FETUS: CPT

## 2019-03-11 NOTE — PROGRESS NOTES
Kyra is a 28 year old  @ likely 10 weeks here with  and interpretor for new OB visit. LMP was late December, unsure of exact date.    See OB questionnaire for pertinent components of HPI.    OBhx:  x 1  Possible ectopic x [id number] naturally  Gyne: Pap smears Normal  Past Medical History:   Diagnosis Date     NO ACTIVE PROBLEMS      Past Surgical History:   Procedure Laterality Date     NO HISTORY OF SURGERY       Patient Active Problem List    Diagnosis Date Noted     Supervision of other normal pregnancy, antepartum 2019     Priority: Medium     Language barrier 2017     Priority: Medium     Speaks primarily Chinese.  Needs         No Known Allergies  Current Outpatient Medications   Medication Sig Dispense Refill     FOLIC ACID PO Take 1 mg by mouth daily         Review of Systems:     CONSTITUTIONAL:NEGATIVE for fever, chills, change in weight  INTEGUMENTARY/SKIN: NEGATIVE for worrisome rashes, moles or lesions  EYES: NEGATIVE for vision changes or irritation  ENT/MOUTH: NEGATIVE for ear, mouth and throat problems  RESP:NEGATIVE for significant cough or SOB  BREAST: NEGATIVE for masses, tenderness or discharge  CV: NEGATIVE for chest pain, palpitations or peripheral edema  GI: NEGATIVE for abdominal pain, heartburn, or change in bowel habits and POSITIVE for nausea and vomiting-vomiting about 1-2 times daily. Overall keeping down solids and fluids. Usually has emesis either first thing in AM, sometimes after dinner.  :  Denies current vaginal bleeding, abnormal vaginal discharge  NEURO: NEGATIVE for weakness, dizziness or paresthesias  HEME/ALLERGY/IMMUNE: NEGATIVE for bleeding problems  PSYCHIATRIC: NEGATIVE for changes in mood or affect      Past Medical History of Father of Baby: No significant medical history  History Since Last Menstrual Period: Nausea and Vomiting    Physical Exam: /72   Pulse 95   Temp 97.6  F (36.4  C) (Oral)   Wt 59.9 kg (132  lb)   LMP 2018 (Exact Date)   SpO2 98%   BMI 24.14 kg/m    General: Well developed, well nourished female  Skin: Normal  HEENT: Normal  Neck: Supple,no adenopathy,thyroid normal  Chest: Clear to auscultation  Heart: Regular rate, rhythm,No murmur, rub, gallop  Abdomen: Benign and No masses, organomegaly    Extremities: Normal  Neurological: Normal   Perineum: Intact   Vulva: Normal genitalia  Vagina: Normal mucosa, no discharge  Cervix: Parous, closed, mobile, no discharge  Uterus: 10 weeks, Normal shape, position and consistency   Adnexa: Normal  Bony Pelvis: Adequate     A/P 28 year old  at approximately 10 weeks by dates  Discussed physician coverage, tertiary support, diet, exercise, weight gain, schedule of visits, routine and indicated ultrasounds, and childbirth education.  Prenatal labs: Prenatal Panel, GC, Chlamydia, Urine Culture.  Was taking folic acid supplement only, will change to prenatal vitamins  Discussed maternal quad screening between 15-20 weeks and reviewed benefits and limitations.  Ultrasound for dating.  Declines need for treatment of nausea and vomiting at this time. We did review relief measures she can try at home.     Fidelina GRAHAM CNP

## 2019-03-12 LAB
BACTERIA SPEC CULT: NORMAL
C TRACH DNA SPEC QL NAA+PROBE: NEGATIVE
N GONORRHOEA DNA SPEC QL NAA+PROBE: NEGATIVE
SPECIMEN SOURCE: NORMAL

## 2019-04-08 ENCOUNTER — PRENATAL OFFICE VISIT (OUTPATIENT)
Dept: OBGYN | Facility: CLINIC | Age: 29
End: 2019-04-08
Payer: COMMERCIAL

## 2019-04-08 VITALS
BODY MASS INDEX: 25.25 KG/M2 | WEIGHT: 137.2 LBS | DIASTOLIC BLOOD PRESSURE: 62 MMHG | TEMPERATURE: 98 F | SYSTOLIC BLOOD PRESSURE: 95 MMHG | OXYGEN SATURATION: 96 % | HEIGHT: 62 IN | HEART RATE: 83 BPM

## 2019-04-08 DIAGNOSIS — Z34.80 SUPERVISION OF OTHER NORMAL PREGNANCY, ANTEPARTUM: Primary | ICD-10-CM

## 2019-04-08 PROCEDURE — 99212 OFFICE O/P EST SF 10 MIN: CPT | Performed by: NURSE PRACTITIONER

## 2019-04-08 ASSESSMENT — PAIN SCALES - GENERAL: PAINLEVEL: NO PAIN (0)

## 2019-04-08 ASSESSMENT — MIFFLIN-ST. JEOR: SCORE: 1295.59

## 2019-04-08 NOTE — PROGRESS NOTES
Patient presents for routine prenatal visit. Prenatal flowsheet reviewed and updated as needed. Visit completed today with aid of telephone interpretor.  Denies vaginal bleeding, loss of fluid, contractions or cramping.  Patient without complaint. Screening ultrasound ordered. Discussed quad screening on return to clinic if desired.  Advice as per Anticipatory Guidance/Checklist updated.  PE: See OB vitals    Questions asked and answered. Next OB visit in 4 week(s) with Dr. Harris.    Fidelina GRAHAM CNP

## 2019-04-14 ENCOUNTER — MEDICAL CORRESPONDENCE (OUTPATIENT)
Dept: HEALTH INFORMATION MANAGEMENT | Facility: CLINIC | Age: 29
End: 2019-04-14

## 2019-05-06 ENCOUNTER — PRENATAL OFFICE VISIT (OUTPATIENT)
Dept: OBGYN | Facility: CLINIC | Age: 29
End: 2019-05-06
Payer: COMMERCIAL

## 2019-05-06 VITALS
SYSTOLIC BLOOD PRESSURE: 100 MMHG | HEART RATE: 81 BPM | WEIGHT: 139.2 LBS | DIASTOLIC BLOOD PRESSURE: 65 MMHG | BODY MASS INDEX: 25.46 KG/M2 | TEMPERATURE: 97.5 F | OXYGEN SATURATION: 98 %

## 2019-05-06 DIAGNOSIS — Z34.80 SUPERVISION OF OTHER NORMAL PREGNANCY, ANTEPARTUM: Primary | ICD-10-CM

## 2019-05-06 PROCEDURE — 81511 FTL CGEN ABNOR FOUR ANAL: CPT | Mod: 90 | Performed by: NURSE PRACTITIONER

## 2019-05-06 PROCEDURE — 99000 SPECIMEN HANDLING OFFICE-LAB: CPT | Performed by: NURSE PRACTITIONER

## 2019-05-06 PROCEDURE — 36415 COLL VENOUS BLD VENIPUNCTURE: CPT | Performed by: NURSE PRACTITIONER

## 2019-05-06 PROCEDURE — 99212 OFFICE O/P EST SF 10 MIN: CPT | Performed by: NURSE PRACTITIONER

## 2019-05-06 NOTE — PROGRESS NOTES
Patient presents for routine prenatal visit. Prenatal flowsheet reviewed and updated as needed.  Denies vaginal bleeding, loss of fluid, contractions or cramping.  Patient without complaint. Has not scheduled screening ultrasound-will call. I discussed with the patient the option of maternal serum screening for chromosomal abnormalities (such as Trisomy 18 and 21) and neural tube defects.  We discussed the screening nature of this test and the potential for false negative and false positive results and the possible need for additional testing including Level 2 ultrasound and amniocentesis. She is given the opportunity to ask questions and have them answered. She accepts testing.    Advice as per Anticipatory Guidance/Checklist updated.  PE: See OB vitals    Questions asked and answered. Next OB visit in 4 week(s) with Dr. Harris.    Fidelina GRAHAM CNP

## 2019-05-06 NOTE — LETTER
[facility]  [address] [facility] [address]  Phone: [phone]    05/08/19    Kyra Torres  [address] [place] [address]      Dear Kyra-    Your maternal quad screening test is negative. This means there is no increased risk for open neural tube defects, trisomy 18 or 21. Please let me know if you have any questions.     Sincerely,      SANTOS Love CNP

## 2019-05-08 LAB
# FETUSES US: NORMAL
# FETUSES: NORMAL
AFP ADJ MOM AMN: 0.54
AFP SERPL-MCNC: 27 NG/ML
AGE - REPORTED: 31.2 YR
CURRENT SMOKER: NO
CURRENT SMOKER: NO
DIABETES STATUS PATIENT: NO
FAMILY MEMBER DISEASES HX: NO
FAMILY MEMBER DISEASES HX: NO
GA METHOD: NORMAL
GA METHOD: NORMAL
GA: NORMAL WK
HCG MOM SERPL: 0.93
HCG SERPL-ACNC: NORMAL IU/L
HX OF HEREDITARY DISORDERS: NO
IDDM PATIENT QL: NO
INHIBIN A MOM SERPL: 0.57
INHIBIN A SERPL-MCNC: 94 PG/ML
INTEGRATED SCN PATIENT-IMP: NORMAL
IVF PREGNANCY: NO
LMP START DATE: NORMAL
MONOCHORIONIC TWINS: NO
PATHOLOGY STUDY: NORMAL
PREV FETUS DEFECT: NO
SERVICE CMNT-IMP: NO
SPECIMEN DRAWN SERPL: NORMAL
U ESTRIOL MOM SERPL: 0.87
U ESTRIOL SERPL-MCNC: 1.52 NG/ML
VALPROIC/CARBAMAZEPINE STATUS: NO
WEIGHT UNITS: NORMAL

## 2019-05-14 ENCOUNTER — ANCILLARY PROCEDURE (OUTPATIENT)
Dept: ULTRASOUND IMAGING | Facility: CLINIC | Age: 29
End: 2019-05-14
Attending: NURSE PRACTITIONER
Payer: COMMERCIAL

## 2019-05-14 DIAGNOSIS — Z34.80 SUPERVISION OF OTHER NORMAL PREGNANCY, ANTEPARTUM: ICD-10-CM

## 2019-05-14 PROCEDURE — 76805 OB US >/= 14 WKS SNGL FETUS: CPT

## 2019-05-20 ENCOUNTER — TELEPHONE (OUTPATIENT)
Dept: OBGYN | Facility: CLINIC | Age: 29
End: 2019-05-20

## 2019-05-20 ENCOUNTER — TRANSFERRED RECORDS (OUTPATIENT)
Dept: HEALTH INFORMATION MANAGEMENT | Facility: CLINIC | Age: 29
End: 2019-05-20

## 2019-05-21 PROBLEM — O35.9XX0 KNOWN FETAL ANOMALY, ANTEPARTUM: Status: ACTIVE | Noted: 2019-05-21

## 2019-05-23 ENCOUNTER — TRANSFERRED RECORDS (OUTPATIENT)
Dept: HEALTH INFORMATION MANAGEMENT | Facility: CLINIC | Age: 29
End: 2019-05-23

## 2019-05-24 ENCOUNTER — TRANSFERRED RECORDS (OUTPATIENT)
Dept: HEALTH INFORMATION MANAGEMENT | Facility: CLINIC | Age: 29
End: 2019-05-24

## 2019-06-11 ENCOUNTER — TRANSFERRED RECORDS (OUTPATIENT)
Dept: HEALTH INFORMATION MANAGEMENT | Facility: CLINIC | Age: 29
End: 2019-06-11

## 2019-06-18 ENCOUNTER — HOSPITAL ENCOUNTER (OUTPATIENT)
Dept: MRI IMAGING | Facility: CLINIC | Age: 29
Discharge: HOME | End: 2019-06-18
Attending: OBSTETRICS & GYNECOLOGY | Admitting: OBSTETRICS & GYNECOLOGY
Payer: COMMERCIAL

## 2019-06-18 ENCOUNTER — TELEPHONE (OUTPATIENT)
Dept: MATERNAL FETAL MEDICINE | Facility: CLINIC | Age: 29
End: 2019-06-18

## 2019-06-18 DIAGNOSIS — O35.9XX0 SUSPECTED FETAL ABNORMALITY AFFECTING MANAGEMENT OF MOTHER, SINGLE OR UNSPECIFIED FETUS: Primary | ICD-10-CM

## 2019-06-18 DIAGNOSIS — Q89.7 MULTIPLE CONGENITAL ANOMALIES: ICD-10-CM

## 2019-06-18 PROCEDURE — 74712 MRI FETAL SNGL/1ST GESTATION: CPT

## 2019-06-18 NOTE — TELEPHONE ENCOUNTER
Dr. Engle reviewed MRI results and would like patient seen in the next week or 2 for a consult to discuss MRI results. Patient called and offered appointment on 6/27/19, as Dr. Engle will be in clinic. She states that she can only come early in the morning. Appointment scheduled for 0800 on 6/27/19. Patient asked for map to be emailed to her at [email or web address]. Telephone conversation was performed using Mandarin  - [phone].

## 2019-06-20 ENCOUNTER — TRANSFERRED RECORDS (OUTPATIENT)
Dept: HEALTH INFORMATION MANAGEMENT | Facility: CLINIC | Age: 29
End: 2019-06-20

## 2019-06-20 ENCOUNTER — TELEPHONE (OUTPATIENT)
Dept: OBGYN | Facility: CLINIC | Age: 29
End: 2019-06-20

## 2019-06-20 NOTE — TELEPHONE ENCOUNTER
Await MFM report and then we can try to contact patient for follow up. She does have appointment next week scheduled with MFM as of now. Fidelina GRAHAM CNP

## 2019-06-20 NOTE — TELEPHONE ENCOUNTER
Update-    Torres's baby is abnormal weighing. Had MRI 2 days ago some facials with hypoplastic face.   They did not f/u with any appt for MFM- patient did not want to schedule.     Next they would like us to call patient to see what they are thinking for the pregnancy if she is planning on terminating or keeping baby.     I spoke with Fidelina to inform of phone call.    Anisa Shafer  Women's Health

## 2019-06-20 NOTE — TELEPHONE ENCOUNTER
Attempted to reach the number listed and it was not valid.  Attempted a different number for [facility] and just got a voice message.  Left message for someone to return call to the woman's clinic for updates on pt.    Vanessa Oropeza RN

## 2019-06-20 NOTE — TELEPHONE ENCOUNTER
[facility] Would like to give updates on patient    Please call to discuss

## 2019-06-21 ENCOUNTER — TELEPHONE (OUTPATIENT)
Dept: OBGYN | Facility: CLINIC | Age: 29
End: 2019-06-21

## 2019-06-21 NOTE — TELEPHONE ENCOUNTER
Did speak with Krysta at [facility]. She has given the patient scheduling information with a clinic in [place] and with patient's permission, she has sent the patient's records to this clinic.   Will give the patient the weekend to process her decision and I will then touch base with her early next week to see if she has any additional questions or concerns we can address for her. Fidelina GRAHAM CNP

## 2019-06-21 NOTE — TELEPHONE ENCOUNTER
RN called and spoke with patient through  services (#[id number]), I relayed Fidelina's message below. Patient states that her and her  have not yet made any decisions regarding termination of their pregnancy. Patient states she didn't know she had an appt with MFM next week and asked if I would cancel it. I said I could cancel it but then recommend that she schedule in clinic with us to discuss options. Patient opted to keep appt with MFM at this time and will continue to talk things through with her .     Patient verbalized understanding and agreed to plan.     Monica Villagomez RN on 6/21/2019 at 9:41 AM

## 2019-06-21 NOTE — TELEPHONE ENCOUNTER
Telephone call to patient via [facility] interpretor. Patient was confused as she thought that seeing MFM replaced her need for routine prenatal care. Explained they manage the higher risk part of the pregnancy, but as long as she remains pregnant, still needs routine care. She had multiple questions related to the tests completed, results. They are still undecided on how to proceed with pregnancy. She had multiple questions. Time on the phone spent answering the questions was over 20 minutes. Strongly encouraged her schedule appointment with our OB group and also follow up appointment with MFM per their recommendations and offered multiple times to assist in scheduling both appointments. At the end of the conversation, patient declined to schedule any follow up appointments and abruptly ended the conversation. Fidelina GRAHAM CNP

## 2019-06-21 NOTE — TELEPHONE ENCOUNTER
Writer faxed over vitals from last office visit to Krysta at [facility],    Patti Thorpe RN on 6/21/2019 at 2:18 PM

## 2019-06-21 NOTE — TELEPHONE ENCOUNTER
Fidelina, I received a call from Krysta, a genetic counselor with [facility].  Patient's fetus has multiple abnormalities.  After going over this with the patient, the patient has decided to terminate her pregnancy.  [facility] gave patient a phone number to call in [place] as patient is too far along to have one here.  If you have any questions, please call Krysta at [phone].  Lesley Hammond, [facility]  June 21, 2019 3:33 PM

## 2019-06-21 NOTE — TELEPHONE ENCOUNTER
I apologize-I should have noted this sooner-can we please confirm with patient that she is aware appointment with MFM is [place] (same area she had her fetal MRI) and not at  location? Thank you. Fidelina GRAHAM CNP

## 2019-06-21 NOTE — TELEPHONE ENCOUNTER
Can we attempt to contact patient regarding follow up? Looks like she has an appointment next week with MFM? Otherwise, would need to be seen by OB MD next week. Based on message I received, they had discussed termination with patient and  seemed to want to proceed in that direction but patient was unsure. They had discussed that at this gestational age, we would need to refer her out of state if they chose to proceed. MFM usually best to help with information on places they can turn to for this. Thank you. Fidelina GRAHAM CNP

## 2019-06-21 NOTE — TELEPHONE ENCOUNTER
Krysta from fetal medicine requesting the Most recent BP reading from last office visit    Please fax [phone]    Thank you

## 2019-06-21 NOTE — TELEPHONE ENCOUNTER
RN spoke with patient using Mandarin  services to verify that patient is aware that her appointment with MFM is at [place] and not [place]. Patient is requesting to speak to provider at this time to discuss this. RN tried to explain why she needs to follow up with MFM. Patient would like a call back from  services. Told her I would reach out to Fidelina to see if she is available to speak to her today.     Patient verbalized understanding and agreed to plan.       Monica Villagomez RN on 6/21/2019 at 10:27 AM

## 2019-06-24 NOTE — TELEPHONE ENCOUNTER
Telephone call to patient via  Interpretor services-[facility] interpretor. Patient did not answer, but via interpretor, message was left to please call if she had any questions we could help answer. We would not continue to call and would respect her need for grieving time and to process everything, but we are available if she needs anything. Fidelina GRAHAM CNP

## 2019-06-25 ENCOUNTER — TELEPHONE (OUTPATIENT)
Dept: MATERNAL FETAL MEDICINE | Facility: CLINIC | Age: 29
End: 2019-06-25

## 2019-06-25 NOTE — TELEPHONE ENCOUNTER
Notes in chart state that patient wishes to terminate her pregnancy (see previous notes). I called patient with Mandarin  to discuss. Patient sounded very teary. It was difficult to follow patients story but states she is planning a termination this week. Her  got on the phone and states they are at a clinic but couldn't state what clinic. I asked if they wanted to come on Thursday still for her appointment and her  said they wouldn't be in. I asked if she had follow up with a doctor and she said she will call if and when they want to follow up. I gave patients  PCC number and encouraged them to call with any questions.

## 2019-06-27 ENCOUNTER — TRANSFERRED RECORDS (OUTPATIENT)
Dept: HEALTH INFORMATION MANAGEMENT | Facility: CLINIC | Age: 29
End: 2019-06-27

## 2019-06-28 ENCOUNTER — TELEPHONE (OUTPATIENT)
Dept: OBGYN | Facility: CLINIC | Age: 29
End: 2019-06-28

## 2019-06-28 NOTE — TELEPHONE ENCOUNTER
Telephone call from Carmen Landau at  [facility] in [place], [place]. Patient was there this week for termination of her pregnancy. She ran me through patient's experience, which started with the first visit on 6/25/19 and relayed she had an uncomplicated delivery on 6/26/19. D&C was done after delivery to ensure no retained products.   Patient was seen the next day again for a check out visit and seemed to be recovering normally. She spent time with the baby and was able to bathe the baby. They recommend they try to wait 6 months before attempting pregnancy again. They instructed her to follow up here in clinic in 1-2 weeks. Patient traveling home today. She has mailed a copy of the visit to the clinic here. Fidelina GRAHAM CNP

## 2019-07-10 ENCOUNTER — OFFICE VISIT (OUTPATIENT)
Dept: OBGYN | Facility: CLINIC | Age: 29
End: 2019-07-10
Payer: COMMERCIAL

## 2019-07-10 VITALS
HEIGHT: 62 IN | SYSTOLIC BLOOD PRESSURE: 107 MMHG | OXYGEN SATURATION: 96 % | BODY MASS INDEX: 26.94 KG/M2 | TEMPERATURE: 97.9 F | WEIGHT: 146.4 LBS | HEART RATE: 93 BPM | DIASTOLIC BLOOD PRESSURE: 71 MMHG

## 2019-07-10 PROBLEM — Z34.80 SUPERVISION OF OTHER NORMAL PREGNANCY, ANTEPARTUM: Status: RESOLVED | Noted: 2019-03-11 | Resolved: 2019-07-10

## 2019-07-10 ASSESSMENT — MIFFLIN-ST. JEOR: SCORE: 1332.32

## 2019-07-10 ASSESSMENT — PAIN SCALES - GENERAL: PAINLEVEL: NO PAIN (0)

## 2019-07-10 ASSESSMENT — PATIENT HEALTH QUESTIONNAIRE - PHQ9: SUM OF ALL RESPONSES TO PHQ QUESTIONS 1-9: 3

## 2019-07-10 NOTE — PROGRESS NOTES
Subjective     Visit completed with aid of phone interpretor.  Kyra Torres is a 29 year old female who presents to clinic today for the following health issues:    HPI   Patient had an elective termination at 26 weeks in [place] 6/26/19. 20 screening ultrasound showed multiple anomalies including significant microcephalus. Patient underwent testing with [facility] including amniocentesis, labs, fetal MRI and ultimately chose to terminate the pregnancy. Due to her gestational age, patient was sent by [facility] to [place].   Provider there called me with update after patient was there and delivery was uncomplicated, patient did have a D&C afterwards as part of their routine care.   She is here today for a follow up/postpartum care.  She is having minimal bleeding.  Last week, passed one larger clot, approximately walnut sized and had an increase in bleeding for a few hours, this has resolved.  Denies fever, pelvic pain.  Feels her moods are ok. She still feels she is grieving, has a lot of questions about future pregnancy.    Declines contraception at this time. Aware her provider in [place] did recommend waiting 6 months before they attempt pregnancy.     Patient Active Problem List   Diagnosis     Language barrier     Known fetal anomaly, antepartum-See MFM report     Past Surgical History:   Procedure Laterality Date     D & C      Termination 6/2019-Delivery and then D&C       Social History     Tobacco Use     Smoking status: Never Smoker     Smokeless tobacco: Never Used   Substance Use Topics     Alcohol use: No     Family History   Problem Relation Age of Onset     Hypertension Mother          Reviewed and updated as needed this visit by Provider         Review of Systems   ROS COMP: Constitutional, HEENT, cardiovascular, pulmonary, gi and gu systems are negative, except as otherwise noted.      Objective    /71 (BP Location: Right arm, Patient Position: Sitting, Cuff Size: Adult Regular)   Pulse 93   Temp  "97.9  F (36.6  C) (Oral)   Ht 1.575 m (5' 2\")   Wt 66.4 kg (146 lb 6.4 oz)   LMP 12/26/2018 (Exact Date)   SpO2 96%   Breastfeeding? Unknown   BMI 26.78 kg/m       Physical Exam   GENERAL: healthy, alert and no distress  RESP: lungs clear to auscultation - no rales, rhonchi or wheezes  CV: regular rate and rhythm, normal S1 S2, no S3 or S4, no murmur, click or rub, no peripheral edema and peripheral pulses strong  ABDOMEN: soft, nontender, no hepatosplenomegaly, no masses and bowel sounds normal   (female): normal female external genitalia, normal urethral meatus, vaginal mucosa, normal cervix/adnexa/uterus without masses or discharge  MS: no gross musculoskeletal defects noted, no edema  SKIN: no suspicious lesions or rashes  PSYCH: mentation appears normal, affect normal/bright  PHQ-9: 3    Assessment & Plan     1. Postpartum exam  We reviewed her delivery, answered her multitude of questions related to future pregnancy. They do plan to start trying again at the end of the year. Discussed her PHQ-9, she feels she has been grieving appropriately, mourning her loss. Declines need for counseling services. She will continue on her PNV. We discussed activities now and when to follow up in clinic.    [facility]      "

## 2019-07-10 NOTE — Clinical Note
Penelope-I billed this as a postpartum visit-not sure if it should be an office visit instead? Thanks.

## 2020-07-06 ENCOUNTER — CARE COORDINATION (OUTPATIENT)
Dept: MATERNAL FETAL MEDICINE | Facility: CLINIC | Age: 30
End: 2020-07-06

## 2020-07-06 ENCOUNTER — PRENATAL OFFICE VISIT (OUTPATIENT)
Dept: OBGYN | Facility: CLINIC | Age: 30
End: 2020-07-06
Payer: MEDICAID

## 2020-07-06 ENCOUNTER — TRANSFERRED RECORDS (OUTPATIENT)
Dept: HEALTH INFORMATION MANAGEMENT | Facility: CLINIC | Age: 30
End: 2020-07-06

## 2020-07-06 ENCOUNTER — TELEPHONE (OUTPATIENT)
Dept: MATERNAL FETAL MEDICINE | Facility: CLINIC | Age: 30
End: 2020-07-06

## 2020-07-06 VITALS
RESPIRATION RATE: 18 BRPM | HEART RATE: 96 BPM | BODY MASS INDEX: 26.5 KG/M2 | SYSTOLIC BLOOD PRESSURE: 110 MMHG | DIASTOLIC BLOOD PRESSURE: 66 MMHG | HEIGHT: 62 IN | TEMPERATURE: 98.5 F | WEIGHT: 144 LBS

## 2020-07-06 DIAGNOSIS — Z34.81 ENCOUNTER FOR SUPERVISION OF OTHER NORMAL PREGNANCY IN FIRST TRIMESTER: Primary | ICD-10-CM

## 2020-07-06 DIAGNOSIS — O35.2XX0 PREVIOUS CHILD WITH CONGENITAL ANOMALY, CURRENTLY PREGNANT, ANTEPARTUM, NOT APPLICABLE OR UNSPECIFIED FETUS: ICD-10-CM

## 2020-07-06 PROBLEM — O35.9XX0 KNOWN FETAL ANOMALY, ANTEPARTUM: Status: RESOLVED | Noted: 2019-05-21 | Resolved: 2020-07-06

## 2020-07-06 LAB
ABO + RH BLD: NORMAL
ABO + RH BLD: NORMAL
ALBUMIN UR-MCNC: NEGATIVE MG/DL
AMPHETAMINES UR QL: NOT DETECTED NG/ML
APPEARANCE UR: CLEAR
BARBITURATES UR QL SCN: NOT DETECTED NG/ML
BENZODIAZ UR QL SCN: NOT DETECTED NG/ML
BILIRUB UR QL STRIP: NEGATIVE
BLD GP AB SCN SERPL QL: NORMAL
BLOOD BANK CMNT PATIENT-IMP: NORMAL
BUPRENORPHINE UR QL: NOT DETECTED NG/ML
CANNABINOIDS UR QL: NOT DETECTED NG/ML
COCAINE UR QL SCN: NOT DETECTED NG/ML
COLOR UR AUTO: YELLOW
D-METHAMPHET UR QL: NOT DETECTED NG/ML
ERYTHROCYTE [DISTWIDTH] IN BLOOD BY AUTOMATED COUNT: 12.8 % (ref 10–15)
GLUCOSE UR STRIP-MCNC: NEGATIVE MG/DL
HCT VFR BLD AUTO: 38.7 % (ref 35–47)
HGB BLD-MCNC: 13.5 G/DL (ref 11.7–15.7)
HGB UR QL STRIP: ABNORMAL
KETONES UR STRIP-MCNC: NEGATIVE MG/DL
LEUKOCYTE ESTERASE UR QL STRIP: NEGATIVE
MCH RBC QN AUTO: 30.3 PG (ref 26.5–33)
MCHC RBC AUTO-ENTMCNC: 34.9 G/DL (ref 31.5–36.5)
MCV RBC AUTO: 87 FL (ref 78–100)
METHADONE UR QL SCN: NOT DETECTED NG/ML
NITRATE UR QL: NEGATIVE
NON-SQ EPI CELLS #/AREA URNS LPF: ABNORMAL /LPF
OPIATES UR QL SCN: NOT DETECTED NG/ML
OXYCODONE UR QL SCN: NOT DETECTED NG/ML
PCP UR QL SCN: NOT DETECTED NG/ML
PH UR STRIP: 6.5 PH (ref 5–7)
PLATELET # BLD AUTO: 266 10E9/L (ref 150–450)
PROPOXYPH UR QL: NOT DETECTED NG/ML
RBC # BLD AUTO: 4.46 10E12/L (ref 3.8–5.2)
RBC #/AREA URNS AUTO: ABNORMAL /HPF
SOURCE: ABNORMAL
SP GR UR STRIP: 1.01 (ref 1–1.03)
SPECIMEN EXP DATE BLD: NORMAL
TRICYCLICS UR QL SCN: NOT DETECTED NG/ML
UROBILINOGEN UR STRIP-ACNC: 0.2 EU/DL (ref 0.2–1)
WBC # BLD AUTO: 7.4 10E9/L (ref 4–11)
WBC #/AREA URNS AUTO: ABNORMAL /HPF

## 2020-07-06 PROCEDURE — 86780 TREPONEMA PALLIDUM: CPT | Performed by: OBSTETRICS & GYNECOLOGY

## 2020-07-06 PROCEDURE — 80306 DRUG TEST PRSMV INSTRMNT: CPT | Performed by: OBSTETRICS & GYNECOLOGY

## 2020-07-06 PROCEDURE — 87491 CHLMYD TRACH DNA AMP PROBE: CPT | Performed by: OBSTETRICS & GYNECOLOGY

## 2020-07-06 PROCEDURE — 82105 ALPHA-FETOPROTEIN SERUM: CPT | Mod: 90 | Performed by: OBSTETRICS & GYNECOLOGY

## 2020-07-06 PROCEDURE — 81001 URINALYSIS AUTO W/SCOPE: CPT | Mod: 59 | Performed by: OBSTETRICS & GYNECOLOGY

## 2020-07-06 PROCEDURE — 86901 BLOOD TYPING SEROLOGIC RH(D): CPT | Performed by: OBSTETRICS & GYNECOLOGY

## 2020-07-06 PROCEDURE — 36415 COLL VENOUS BLD VENIPUNCTURE: CPT | Performed by: OBSTETRICS & GYNECOLOGY

## 2020-07-06 PROCEDURE — 87591 N.GONORRHOEAE DNA AMP PROB: CPT | Performed by: OBSTETRICS & GYNECOLOGY

## 2020-07-06 PROCEDURE — 99000 SPECIMEN HANDLING OFFICE-LAB: CPT | Performed by: OBSTETRICS & GYNECOLOGY

## 2020-07-06 PROCEDURE — 86850 RBC ANTIBODY SCREEN: CPT | Performed by: OBSTETRICS & GYNECOLOGY

## 2020-07-06 PROCEDURE — 99213 OFFICE O/P EST LOW 20 MIN: CPT | Mod: 25 | Performed by: OBSTETRICS & GYNECOLOGY

## 2020-07-06 PROCEDURE — G0499 HEPB SCREEN HIGH RISK INDIV: HCPCS | Performed by: OBSTETRICS & GYNECOLOGY

## 2020-07-06 PROCEDURE — 87389 HIV-1 AG W/HIV-1&-2 AB AG IA: CPT | Performed by: OBSTETRICS & GYNECOLOGY

## 2020-07-06 PROCEDURE — 40000791 ZZHCL STATISTIC VERIFI PRENATAL TRISOMY 21,18,13: Mod: 90 | Performed by: OBSTETRICS & GYNECOLOGY

## 2020-07-06 PROCEDURE — 86900 BLOOD TYPING SEROLOGIC ABO: CPT | Performed by: OBSTETRICS & GYNECOLOGY

## 2020-07-06 PROCEDURE — 86762 RUBELLA ANTIBODY: CPT | Performed by: OBSTETRICS & GYNECOLOGY

## 2020-07-06 PROCEDURE — 76815 OB US LIMITED FETUS(S): CPT | Performed by: OBSTETRICS & GYNECOLOGY

## 2020-07-06 PROCEDURE — 87086 URINE CULTURE/COLONY COUNT: CPT | Performed by: OBSTETRICS & GYNECOLOGY

## 2020-07-06 PROCEDURE — 85027 COMPLETE CBC AUTOMATED: CPT | Performed by: OBSTETRICS & GYNECOLOGY

## 2020-07-06 ASSESSMENT — MIFFLIN-ST. JEOR: SCORE: 1321.43

## 2020-07-06 NOTE — PROGRESS NOTES
Due to language barrier, interview held with chinese  services on line.Discussed physician coverage, tertiary support, diet, exercise, weight gain, schedule of visits, routine and indicated ultrasounds, childbirth education and antepartum testing for certain birth defects.  Encouraged patient to review contents of Prenatal Breastfeeding Education Toolkit. Offered opportunity to answer questions regarding the importance of skin to skin contact, early initiation of exclusive breastfeeding for the first six months and rooming in while in the hospital.    Discussed previous pregnancy with microcephaly and other anomalies incompatible with life, s/p termination at 26 weeks in [place]; pt very worried; taking Folic Acid 1mg daily; discussed NIPT with gender, AFP test and Level 2 sonogram    Group call support for deliveries was discussed, as well as tertiary support in event of high risk issues within [facility] system of [facility].    Discussed current state of COVID-19 in pregnancy, when to seek out emergency care, how to self care at home with milder symptoms.        Options for  testing for birth defects were discussed with the patient, generally including CVS testing, Quad screen serum testing, nuchal lucency/blood marker testing, genetic amniocentesis, Verifi testing  and/or level 2 ultrasound.    Current issues include: nausea, moderate    Past medical, surgical, social and family histories reviewed on OB questionaire and included on episode summary.  Pertinent review of systems items stated above. See OB questionaire for pertinent components of HPI.    Past Medical History:   Diagnosis Date     NO ACTIVE PROBLEMS      See epic chart    Past Surgical History:   Procedure Laterality Date     D & C      Termination 2019-Delivery and then D&C     See epic chart    Patient Active Problem List    Diagnosis Date Noted     Previous child with congenital anomaly, currently pregnant,  "antepartum, not applicable or unspecified fetus 07/06/2020     Priority: Medium     Encounter for supervision of other normal pregnancy in first trimester 03/11/2019     Priority: Medium     Last pregnancy complicated by microcephaly--underwent elective termination of pregnancy at 26 weeks in [place]       Language barrier 02/27/2017     Priority: Medium     Speaks primarily Chinese.  Needs          OBJECTIVE: /66 (BP Location: Left arm, Patient Position: Chair, Cuff Size: Adult Regular)   Pulse 96   Temp 98.5  F (36.9  C) (Tympanic)   Resp 18   Ht 1.575 m (5' 2\")   Wt 65.3 kg (144 lb)   LMP 03/20/2020 (LMP Unknown)   BMI 26.34 kg/m      GENERAL APPEARANCE: healthy, alert and no distress     ABDOMEN: uterus palpalble approx 16 weeks size  PELVIC:  EGBUS:  within normal limits  CERVIX:  Old blood in vault; closed  UTERUS:  Enlarged, 15 week size  ADNEXAE:  non-tender, no masses palpable, no cul de sac nodularity     NECK: no adenopathy, no asymmetry, masses, or scars and thyroid normal to palpation     RESP: lungs clear to auscultation , respiratory effort WNL     CV: regular rates and rhythm, normal S1 S2, no S3 or S4 and no murmur, click or rub -     SKIN: no suspicious lesions or rashes     PSYCH: mentation appears normal. and affect normal/bright, oriented to person/place/time     LYMPHATICS: No axillary, cervical, inguinal, or supraclavicular nodes    Transabdominal ultrasound performed;  Single viable intrauterine pregnancy demonstrated; anterior placenta without previa; fetal head appears normal; no obvious anomalies seen    CRL=9.13 cm c/w 15 weeks gestation    JIS=602 bpm    EDC by LMP 12/30/20  EDC by ultrasound 12/28/20    Fetal heart motion was visualized.    Reji unavailable to intake images so photos to be scanned into chart separately    Karishma Curry MD  [facility]          ASSESSMENT:    ICD-10-CM    1. Encounter for supervision of other normal pregnancy in first " trimester  Z34.81 Chlamydia trachomatis PCR     Neisseria gonorrhoeae PCR     CBC with platelets     ABO/Rh type and screen     Hepatitis B surface antigen     Rubella Antibody IgG Quantitative     Urine Culture Aerobic Bacterial     *UA reflex to Microscopic     HIV Antigen Antibody Combo     US OB <14 Weeks w Transvaginal Single     Urine Drugs of Abuse Screen Panel 13     Treponema Abs w Reflex to RPR and Titer     Alpha fetoprotein maternal screen   2. Previous child with congenital anomaly, currently pregnant, antepartum, not applicable or unspecified fetus  O35.2XX0 Alpha fetoprotein maternal screen         PLAN: see orders and OB problem list annotations  Recommend continue the Folic Acid 1mg daily  NIPT with gender, AFP test, level 2 sonogram  reassurred pt that on this preliminary scan, I see no evidence of microcephaly    Karishma Curry MD

## 2020-07-06 NOTE — NURSING NOTE
"Initial /66 (BP Location: Left arm, Patient Position: Chair, Cuff Size: Adult Regular)   Pulse 96   Temp 98.5  F (36.9  C) (Tympanic)   Resp 18   Ht 1.575 m (5' 2\")   Wt 65.3 kg (144 lb)   LMP 03/20/2020 (LMP Unknown)   BMI 26.34 kg/m   Estimated body mass index is 26.34 kg/m  as calculated from the following:    Height as of this encounter: 1.575 m (5' 2\").    Weight as of this encounter: 65.3 kg (144 lb). .      "

## 2020-07-06 NOTE — TELEPHONE ENCOUNTER
Phone call to Kyra with [employer]arin  ID [id number] regarding referral from Dr. Curry due to prior OB history. Patient agrees to schedule L2 US on 8/3 215pm. Address and phone number given over the phone.      Nadia Jimenez RN

## 2020-07-07 LAB
C TRACH DNA SPEC QL NAA+PROBE: NEGATIVE
HBV SURFACE AG SERPL QL IA: NONREACTIVE
HIV 1+2 AB+HIV1 P24 AG SERPL QL IA: NONREACTIVE
N GONORRHOEA DNA SPEC QL NAA+PROBE: NEGATIVE
RUBV IGG SERPL IA-ACNC: 23 IU/ML
SPECIMEN SOURCE: NORMAL
SPECIMEN SOURCE: NORMAL
T PALLIDUM AB SER QL: NONREACTIVE

## 2020-07-08 ENCOUNTER — TRANSFERRED RECORDS (OUTPATIENT)
Dept: HEALTH INFORMATION MANAGEMENT | Facility: CLINIC | Age: 30
End: 2020-07-08

## 2020-07-08 LAB
# FETUSES US: NORMAL
# FETUSES: NORMAL
AFP ADJ MOM AMN: 0.55
AFP SERPL-MCNC: 17 NG/ML
AGE - REPORTED: 32.5 YR
BACTERIA SPEC CULT: NORMAL
CURRENT SMOKER: NO
CURRENT SMOKER: NO
DIABETES STATUS PATIENT: NO
FAMILY MEMBER DISEASES HX: NO
FAMILY MEMBER DISEASES HX: NO
GA METHOD: NORMAL
GA METHOD: NORMAL
GA: NORMAL WK
IDDM PATIENT QL: NO
INTEGRATED SCN PATIENT-IMP: NORMAL
LMP START DATE: NORMAL
Lab: NORMAL
MONOCHORIONIC TWINS: NO
SERVICE CMNT-IMP: NO
SPECIMEN DRAWN SERPL: NORMAL
SPECIMEN SOURCE: NORMAL
VALPROIC/CARBAMAZEPINE STATUS: NO
WEIGHT UNITS: NORMAL

## 2020-07-13 ENCOUNTER — TELEPHONE (OUTPATIENT)
Dept: OBGYN | Facility: CLINIC | Age: 30
End: 2020-07-13

## 2020-07-13 NOTE — TELEPHONE ENCOUNTER
Had called interpretor service for Mandarin speaking pt.    Results received from [facility] testing in [place] triage.      Testing done:  Innatal Prenatal Screen    Action:  Spoke to patient and gave NORMAL results and routed to provider.     Gender:  Will ask patient if they wish to know the gender. Pt wishes to know the gender of her baby, this was given via this call.    Rosemarie Christianson RN

## 2020-07-24 LAB — NON INVASIVE PRENATAL TEST CELL FREE DNA: NORMAL

## 2020-08-03 ENCOUNTER — PRENATAL OFFICE VISIT (OUTPATIENT)
Dept: OBGYN | Facility: CLINIC | Age: 30
End: 2020-08-03

## 2020-08-03 VITALS
DIASTOLIC BLOOD PRESSURE: 66 MMHG | SYSTOLIC BLOOD PRESSURE: 106 MMHG | TEMPERATURE: 97.7 F | BODY MASS INDEX: 26.89 KG/M2 | WEIGHT: 147 LBS | HEART RATE: 94 BPM

## 2020-08-03 DIAGNOSIS — Z34.92 PRENATAL CARE, SECOND TRIMESTER: Primary | ICD-10-CM

## 2020-08-03 PROCEDURE — 99207 ZZC PRENATAL VISIT: CPT | Performed by: OBSTETRICS & GYNECOLOGY

## 2020-08-03 NOTE — NURSING NOTE
"Initial /66 (BP Location: Right arm, Patient Position: Chair, Cuff Size: Adult Regular)   Pulse 94   Temp 97.7  F (36.5  C) (Tympanic)   Wt 66.7 kg (147 lb)   LMP 03/20/2020 (LMP Unknown)   Breastfeeding No   BMI 26.89 kg/m   Estimated body mass index is 26.89 kg/m  as calculated from the following:    Height as of 7/6/20: 1.575 m (5' 2\").    Weight as of this encounter: 66.7 kg (147 lb). .    Tahmina Black MA    "

## 2020-08-03 NOTE — PROGRESS NOTES
"[facility]   OB/GYN Clinic    CC: Return OB     Subjective:    Kyra is a 30 year old  at 19w3d who presents for return OB visit. She reports feeling well. Denies uterine cramping, vaginal bleeding or leaking, dysuria. +fetal movement.     Objective:  /66 (BP Location: Right arm, Patient Position: Chair, Cuff Size: Adult Regular)   Pulse 94   Temp 97.7  F (36.5  C) (Tympanic)   Wt 66.7 kg (147 lb)   LMP 2020 (LMP Unknown)   Breastfeeding No   BMI 26.89 kg/m      Estimated body mass index is 26.89 kg/m  as calculated from the following:    Height as of 20: 1.575 m (5' 2\").    Weight as of this encounter: 66.7 kg (147 lb).    Physical Exam:  Gen: Pleasant, talkative female in no apparent distress   Respiratory: breathing comfortably on room air   Cardiac: Warm and well-perfused.   GI: Abd soft and non-tender, gravid  MSK: Grossly normal movement of all four extremities  Psych: mood and affect bright   Lower extremity: edema not present     Fetal dop tones: 140s bpm      Assessment/Plan:   30 year old  at 19w3d who presents for follow-up OB visit.   1) New OB lab; T&S, CBC, HIV, RPR, HepBsAg, Hep B antibody, rubella, GC/Chlam. Third tri labs at 28w  2) Genetics testing: NIPT WNL  3) anatomy scan scheduled with MFM consult given hx of termination at 26w for microcephaly, on folic acid 1 mg daily  4) Medication review: no changes, continue prenatal vitamin   5) Immunizations: flu shot when new available, Tdap at 28wks    Return to clinic in 4 weeks.     Edith Hammond MD   8/3/2020 10:20 AM     "

## 2020-08-07 ENCOUNTER — PRE VISIT (OUTPATIENT)
Dept: MATERNAL FETAL MEDICINE | Facility: CLINIC | Age: 30
End: 2020-08-07

## 2020-08-10 ENCOUNTER — HOSPITAL ENCOUNTER (OUTPATIENT)
Dept: ULTRASOUND IMAGING | Facility: CLINIC | Age: 30
End: 2020-08-10
Attending: OBSTETRICS & GYNECOLOGY
Payer: COMMERCIAL

## 2020-08-10 ENCOUNTER — OFFICE VISIT (OUTPATIENT)
Dept: MATERNAL FETAL MEDICINE | Facility: CLINIC | Age: 30
End: 2020-08-10
Attending: OBSTETRICS & GYNECOLOGY
Payer: COMMERCIAL

## 2020-08-10 DIAGNOSIS — Z36.89 ENCOUNTER FOR FETAL ANATOMIC SURVEY: Primary | ICD-10-CM

## 2020-08-10 DIAGNOSIS — Z36.89 ENCOUNTER FOR ULTRASOUND TO CHECK FETAL GROWTH: ICD-10-CM

## 2020-08-10 DIAGNOSIS — O26.90 PREGNANCY RELATED CONDITION, ANTEPARTUM: ICD-10-CM

## 2020-08-10 PROCEDURE — 76811 OB US DETAILED SNGL FETUS: CPT

## 2020-08-10 NOTE — PROGRESS NOTES
"Please see \"Imaging\" tab under \"Chart Review\" for details of today's US.    Shaista Sheldon, DO    "

## 2020-08-31 ENCOUNTER — PRENATAL OFFICE VISIT (OUTPATIENT)
Dept: OBGYN | Facility: CLINIC | Age: 30
End: 2020-08-31
Payer: COMMERCIAL

## 2020-08-31 VITALS
RESPIRATION RATE: 16 BRPM | WEIGHT: 154.4 LBS | HEART RATE: 92 BPM | SYSTOLIC BLOOD PRESSURE: 104 MMHG | HEIGHT: 62 IN | DIASTOLIC BLOOD PRESSURE: 63 MMHG | BODY MASS INDEX: 28.41 KG/M2

## 2020-08-31 DIAGNOSIS — Z34.82 PRENATAL CARE, SUBSEQUENT PREGNANCY IN SECOND TRIMESTER: Primary | ICD-10-CM

## 2020-08-31 PROCEDURE — 99207 ZZC PRENATAL VISIT: CPT | Performed by: OBSTETRICS & GYNECOLOGY

## 2020-08-31 ASSESSMENT — MIFFLIN-ST. JEOR: SCORE: 1363.6

## 2020-08-31 NOTE — PROGRESS NOTES
"[facility]   OB/GYN Clinic     CC: Return OB      Subjective:     Kyra is a 30 year old  at 23w3d who presents for return OB visit. She reports feeling well. Denies uterine cramping, vaginal bleeding or leaking, dysuria. +fetal movement.      Objective:  /63 (BP Location: Right arm, Patient Position: Chair, Cuff Size: Adult Regular)   Pulse 92   Resp 16   Ht 1.575 m (5' 2\")   Wt 70 kg (154 lb 6.4 oz)   LMP 2020 (LMP Unknown)   BMI 28.24 kg/m       Physical Exam:  Gen: Pleasant, talkative female in no apparent distress   Respiratory: breathing comfortably on room air   Cardiac: Warm and well-perfused.   GI: Abd soft and non-tender, gravid  MSK: Grossly normal movement of all four extremities  Psych: mood and affect bright   Lower extremity: edema not present      See OB flowsheet      Assessment/Plan:   30 year old  at 23w3d who presents for follow-up OB visit.   1) New OB labs nl. Plan 3rd tri labs at next visit.   2) Genetics testing: NIPT WNL, AFP nl   3) s/p L2 with MFM consult given hx of termination at 26w for microcephaly, on folic acid 1 mg daily  4) Medication review: no changes, continue prenatal vitamin   5) Immunizations: flu shot when new available, Tdap at 28wks  6) Marginal cord; serial growth planned, scheduled with MFM     Return to clinic in 4 weeks.      Cathleen Nagy MD  OB/GYN      "

## 2020-08-31 NOTE — PROGRESS NOTES
"Initial /63 (BP Location: Right arm, Patient Position: Chair, Cuff Size: Adult Regular)   Pulse 92   Resp 16   Ht 1.575 m (5' 2\")   Wt 70 kg (154 lb 6.4 oz)   LMP 03/20/2020 (LMP Unknown)   BMI 28.24 kg/m   Estimated body mass index is 28.24 kg/m  as calculated from the following:    Height as of this encounter: 1.575 m (5' 2\").    Weight as of this encounter: 70 kg (154 lb 6.4 oz). .      "

## 2020-09-21 ENCOUNTER — HOSPITAL ENCOUNTER (OUTPATIENT)
Dept: ULTRASOUND IMAGING | Facility: CLINIC | Age: 30
End: 2020-09-21
Attending: OBSTETRICS & GYNECOLOGY
Payer: COMMERCIAL

## 2020-09-21 ENCOUNTER — OFFICE VISIT (OUTPATIENT)
Dept: MATERNAL FETAL MEDICINE | Facility: CLINIC | Age: 30
End: 2020-09-21
Attending: OBSTETRICS & GYNECOLOGY
Payer: COMMERCIAL

## 2020-09-21 DIAGNOSIS — O43.199 MARGINAL INSERTION OF UMBILICAL CORD AFFECTING MANAGEMENT OF MOTHER: Primary | ICD-10-CM

## 2020-09-21 DIAGNOSIS — Z36.89 ENCOUNTER FOR ULTRASOUND TO CHECK FETAL GROWTH: ICD-10-CM

## 2020-09-21 PROCEDURE — 76816 OB US FOLLOW-UP PER FETUS: CPT

## 2020-09-21 NOTE — PROGRESS NOTES
"Please see \"Imaging\" tab under \"Chart Review\" for details of today's US at the [facility].    Bairon Jones MD  Maternal-Fetal Medicine      "

## 2020-09-28 ENCOUNTER — PRENATAL OFFICE VISIT (OUTPATIENT)
Dept: OBGYN | Facility: CLINIC | Age: 30
End: 2020-09-28
Payer: COMMERCIAL

## 2020-09-28 VITALS
SYSTOLIC BLOOD PRESSURE: 108 MMHG | DIASTOLIC BLOOD PRESSURE: 66 MMHG | RESPIRATION RATE: 18 BRPM | BODY MASS INDEX: 29.08 KG/M2 | WEIGHT: 159 LBS | TEMPERATURE: 96.9 F | HEART RATE: 101 BPM

## 2020-09-28 DIAGNOSIS — Z34.83 ENCOUNTER FOR SUPERVISION OF OTHER NORMAL PREGNANCY IN THIRD TRIMESTER: Primary | ICD-10-CM

## 2020-09-28 DIAGNOSIS — Z23 NEED FOR PROPHYLACTIC VACCINATION AND INOCULATION AGAINST INFLUENZA: ICD-10-CM

## 2020-09-28 DIAGNOSIS — Z34.92 PRENATAL CARE, SECOND TRIMESTER: ICD-10-CM

## 2020-09-28 LAB
ERYTHROCYTE [DISTWIDTH] IN BLOOD BY AUTOMATED COUNT: 13 % (ref 10–15)
GLUCOSE 1H P 50 G GLC PO SERPL-MCNC: 145 MG/DL (ref 60–129)
HCT VFR BLD AUTO: 38.3 % (ref 35–47)
HGB BLD-MCNC: 12.8 G/DL (ref 11.7–15.7)
MCH RBC QN AUTO: 29.7 PG (ref 26.5–33)
MCHC RBC AUTO-ENTMCNC: 33.4 G/DL (ref 31.5–36.5)
MCV RBC AUTO: 89 FL (ref 78–100)
PLATELET # BLD AUTO: 244 10E9/L (ref 150–450)
RBC # BLD AUTO: 4.31 10E12/L (ref 3.8–5.2)
WBC # BLD AUTO: 6.7 10E9/L (ref 4–11)

## 2020-09-28 PROCEDURE — 90715 TDAP VACCINE 7 YRS/> IM: CPT | Performed by: OBSTETRICS & GYNECOLOGY

## 2020-09-28 PROCEDURE — 86780 TREPONEMA PALLIDUM: CPT | Performed by: OBSTETRICS & GYNECOLOGY

## 2020-09-28 PROCEDURE — 90472 IMMUNIZATION ADMIN EACH ADD: CPT | Performed by: OBSTETRICS & GYNECOLOGY

## 2020-09-28 PROCEDURE — 90686 IIV4 VACC NO PRSV 0.5 ML IM: CPT | Performed by: OBSTETRICS & GYNECOLOGY

## 2020-09-28 PROCEDURE — 85027 COMPLETE CBC AUTOMATED: CPT | Performed by: OBSTETRICS & GYNECOLOGY

## 2020-09-28 PROCEDURE — 90471 IMMUNIZATION ADMIN: CPT | Performed by: OBSTETRICS & GYNECOLOGY

## 2020-09-28 PROCEDURE — 36415 COLL VENOUS BLD VENIPUNCTURE: CPT | Performed by: OBSTETRICS & GYNECOLOGY

## 2020-09-28 PROCEDURE — 82950 GLUCOSE TEST: CPT | Performed by: OBSTETRICS & GYNECOLOGY

## 2020-09-28 PROCEDURE — 99207 ZZC PRENATAL VISIT: CPT | Performed by: OBSTETRICS & GYNECOLOGY

## 2020-09-28 NOTE — NURSING NOTE
Prior to immunization administration, verified patients identity using patient s name and date of birth. Please see Immunization Activity for additional information.     Screening Questionnaire for Adult Immunization    Are you sick today?   No   Do you have allergies to medications, food, a vaccine component or latex?   No   Have you ever had a serious reaction after receiving a vaccination?   No   Do you have a long-term health problem with heart, lung, kidney, or metabolic disease (e.g., diabetes), asthma, a blood disorder, no spleen, complement component deficiency, a cochlear implant, or a spinal fluid leak?  Are you on long-term aspirin therapy?   No   Do you have cancer, leukemia, HIV/AIDS, or any other immune system problem?   No   Do you have a parent, brother, or sister with an immune system problem?   No   In the past 3 months, have you taken medications that affect  your immune system, such as prednisone, other steroids, or anticancer drugs; drugs for the treatment of rheumatoid arthritis, Crohn s disease, or psoriasis; or have you had radiation treatments?   No   Have you had a seizure, or a brain or other nervous system problem?   No   During the past year, have you received a transfusion of blood or blood    products, or been given immune (gamma) globulin or antiviral drug?   No   For women: Are you pregnant or is there a chance you could become       pregnant during the next month?   yes   Have you received any vaccinations in the past 4 weeks?   No     Immunization questionnaire answers were all negative.        Per orders of Dr. ryan, injection of adacel given by Meka Nguyen CMA. Patient instructed to remain in clinic for 15 minutes afterwards, and to report any adverse reaction to me immediately.       Screening performed by Meka Nguyen CMA on 9/28/2020 at 11:38 AM.

## 2020-09-28 NOTE — NURSING NOTE
"Initial /66 (BP Location: Right arm, Patient Position: Chair, Cuff Size: Adult Regular)   Pulse 101   Temp 96.9  F (36.1  C) (Tympanic)   Resp 18   Wt 72.1 kg (159 lb)   LMP 03/20/2020 (LMP Unknown)   BMI 29.08 kg/m   Estimated body mass index is 29.08 kg/m  as calculated from the following:    Height as of 8/31/20: 1.575 m (5' 2\").    Weight as of this encounter: 72.1 kg (159 lb). .      "

## 2020-09-28 NOTE — RESULT ENCOUNTER NOTE
Call to pt to notify of below.  Unable to reach.  Left message through interpretor services for pt to call back .    Renee Day   Ob/Gyn Clinic  RN

## 2020-09-29 LAB — T PALLIDUM AB SER QL: NONREACTIVE

## 2020-10-01 DIAGNOSIS — Z34.81 ENCOUNTER FOR SUPERVISION OF OTHER NORMAL PREGNANCY IN FIRST TRIMESTER: Primary | ICD-10-CM

## 2020-10-01 NOTE — RESULT ENCOUNTER NOTE
Pt notified of below through interpretor.  Pt reports understanding.  All questions answered.  Future lab order placed.  Reviewed testing guidelines and expectations.  Pt conveys understanding.  Pt scheduled appointment 10/12/2020    Renee Day   Ob/Gyn Clinic  [facility]

## 2020-10-12 ENCOUNTER — PRENATAL OFFICE VISIT (OUTPATIENT)
Dept: OBGYN | Facility: CLINIC | Age: 30
End: 2020-10-12
Payer: COMMERCIAL

## 2020-10-12 VITALS
DIASTOLIC BLOOD PRESSURE: 59 MMHG | TEMPERATURE: 97.6 F | SYSTOLIC BLOOD PRESSURE: 103 MMHG | BODY MASS INDEX: 29.08 KG/M2 | HEART RATE: 103 BPM | WEIGHT: 159 LBS

## 2020-10-12 DIAGNOSIS — Z34.93 PRENATAL CARE, THIRD TRIMESTER: Primary | ICD-10-CM

## 2020-10-12 DIAGNOSIS — Z34.81 ENCOUNTER FOR SUPERVISION OF OTHER NORMAL PREGNANCY IN FIRST TRIMESTER: ICD-10-CM

## 2020-10-12 LAB
GLUCOSE 1H P 100 G GLC PO SERPL-MCNC: 166 MG/DL (ref 60–179)
GLUCOSE 2H P 100 G GLC PO SERPL-MCNC: 124 MG/DL (ref 60–154)
GLUCOSE 3H P 100 G GLC PO SERPL-MCNC: 159 MG/DL (ref 60–139)
GLUCOSE P FAST SERPL-MCNC: 93 MG/DL (ref 60–94)

## 2020-10-12 PROCEDURE — 82952 GTT-ADDED SAMPLES: CPT | Performed by: OBSTETRICS & GYNECOLOGY

## 2020-10-12 PROCEDURE — 82951 GLUCOSE TOLERANCE TEST (GTT): CPT | Performed by: OBSTETRICS & GYNECOLOGY

## 2020-10-12 PROCEDURE — 99207 PR PRENATAL VISIT: CPT | Performed by: OBSTETRICS & GYNECOLOGY

## 2020-10-12 NOTE — NURSING NOTE
"Initial /59 (BP Location: Right arm, Patient Position: Chair, Cuff Size: Adult Regular)   Pulse 103   Temp 97.6  F (36.4  C) (Tympanic)   Wt 72.1 kg (159 lb)   LMP 03/20/2020 (LMP Unknown)   Breastfeeding No   BMI 29.08 kg/m   Estimated body mass index is 29.08 kg/m  as calculated from the following:    Height as of 8/31/20: 1.575 m (5' 2\").    Weight as of this encounter: 72.1 kg (159 lb). .    Tahmina Black MA    "

## 2020-10-12 NOTE — RESULT ENCOUNTER NOTE
Pt notified of below through interpretor.  Pt reports understanding.  Pt did not have further questions or concerns.    Renee Day   Ob/Gyn Clinic  [facility]

## 2020-10-12 NOTE — PROGRESS NOTES
[facility]   OB/GYN Clinic     CC: Return OB      Subjective:     Kyra is a 30 year old  at 29w3d who presents for return OB visit. She reports feeling well. Denies uterine cramping, vaginal bleeding or leaking, dysuria. +fetal movement.      Objective:  /59 (BP Location: Right arm, Patient Position: Chair, Cuff Size: Adult Regular)   Pulse 103   Temp 97.6  F (36.4  C) (Tympanic)   Wt 72.1 kg (159 lb)   LMP 2020 (LMP Unknown)   Breastfeeding No   BMI 29.08 kg/m       Physical Exam:  Gen: Pleasant, talkative female in no apparent distress   Respiratory: breathing comfortably on room air   Cardiac: Warm and well-perfused.   GI: Abd soft and non-tender, gravid  MSK: Grossly normal movement of all four extremities  Psych: mood and affect bright   Lower extremity: edema not present      Fetal dop tones: 140s bpm  FH 29      Assessment/Plan:   30 year old  at 29w3d who presents for follow-up OB visit.   1) New OB lab; T&S, CBC, HIV, RPR, HepBsAg, Hep B antibody, rubella, GC/Chlam WNL . Third tri labs with elevated GCT, GTT in process today  2) Genetics testing: NIPT WNL  3) anatomy scan with marginal cord, repeat growth WNL and marginal cord resolved  4) Medication review: no changes, continue prenatal vitamin   5) Immunizations: flu shot given, Tdap at 28wks     Return to clinic in 2 weeks.     Edith Hammond MD   10/12/2020 9:24 AM

## 2020-10-26 ENCOUNTER — PRENATAL OFFICE VISIT (OUTPATIENT)
Dept: OBGYN | Facility: CLINIC | Age: 30
End: 2020-10-26
Payer: COMMERCIAL

## 2020-10-26 VITALS
HEIGHT: 62 IN | SYSTOLIC BLOOD PRESSURE: 102 MMHG | TEMPERATURE: 97.3 F | WEIGHT: 159 LBS | HEART RATE: 99 BPM | BODY MASS INDEX: 29.26 KG/M2 | RESPIRATION RATE: 18 BRPM | DIASTOLIC BLOOD PRESSURE: 63 MMHG

## 2020-10-26 DIAGNOSIS — Z34.93 PRENATAL CARE, THIRD TRIMESTER: Primary | ICD-10-CM

## 2020-10-26 PROCEDURE — 99207 PR PRENATAL VISIT: CPT | Performed by: OBSTETRICS & GYNECOLOGY

## 2020-10-26 ASSESSMENT — MIFFLIN-ST. JEOR: SCORE: 1384.47

## 2020-10-26 NOTE — PROGRESS NOTES
"CC: Here for routine prenatal visit @ 31w3d   HPI:  Reports feeling baby lower at times and some BHC;no bleeding or LOF; due for next  Growth scan around time of next visit    PE: /63 (BP Location: Right arm, Patient Position: Chair, Cuff Size: Adult Regular)   Pulse 99   Temp 97.3  F (36.3  C) (Tympanic)   Resp 18   Ht 1.575 m (5' 2\")   Wt 72.1 kg (159 lb)   LMP 03/20/2020 (LMP Unknown)   BMI 29.08 kg/m     See OB flowsheet      A:  1. Prenatal care, third trimester    - US OB >14 Weeks Follow Up; Future at time of next visit    Routine prenatal care  RTC 2 weeks.      Karishma Crury M.D.     "

## 2020-10-26 NOTE — NURSING NOTE
"Initial /63 (BP Location: Right arm, Patient Position: Chair, Cuff Size: Adult Regular)   Pulse 99   Temp 97.3  F (36.3  C) (Tympanic)   Resp 18   Ht 1.575 m (5' 2\")   Wt 72.1 kg (159 lb)   LMP 03/20/2020 (LMP Unknown)   BMI 29.08 kg/m   Estimated body mass index is 29.08 kg/m  as calculated from the following:    Height as of this encounter: 1.575 m (5' 2\").    Weight as of this encounter: 72.1 kg (159 lb). .      "

## 2020-11-16 ENCOUNTER — HOSPITAL ENCOUNTER (OUTPATIENT)
Dept: ULTRASOUND IMAGING | Facility: CLINIC | Age: 30
Discharge: HOME | End: 2020-11-16
Attending: OBSTETRICS & GYNECOLOGY | Admitting: OBSTETRICS & GYNECOLOGY
Payer: COMMERCIAL

## 2020-11-16 ENCOUNTER — PRENATAL OFFICE VISIT (OUTPATIENT)
Dept: OBGYN | Facility: CLINIC | Age: 30
End: 2020-11-16
Payer: COMMERCIAL

## 2020-11-16 VITALS
BODY MASS INDEX: 29.7 KG/M2 | SYSTOLIC BLOOD PRESSURE: 115 MMHG | DIASTOLIC BLOOD PRESSURE: 67 MMHG | RESPIRATION RATE: 14 BRPM | HEART RATE: 100 BPM | HEIGHT: 62 IN | TEMPERATURE: 97.8 F | WEIGHT: 161.4 LBS

## 2020-11-16 DIAGNOSIS — Z34.83 PRENATAL CARE, SUBSEQUENT PREGNANCY IN THIRD TRIMESTER: Primary | ICD-10-CM

## 2020-11-16 DIAGNOSIS — Z34.93 PRENATAL CARE, THIRD TRIMESTER: ICD-10-CM

## 2020-11-16 PROCEDURE — 99207 PR PRENATAL VISIT: CPT | Performed by: OBSTETRICS & GYNECOLOGY

## 2020-11-16 PROCEDURE — 76816 OB US FOLLOW-UP PER FETUS: CPT

## 2020-11-16 ASSESSMENT — MIFFLIN-ST. JEOR: SCORE: 1395.36

## 2020-11-16 NOTE — NURSING NOTE
"Initial /67 (BP Location: Right arm, Patient Position: Chair, Cuff Size: Adult Regular)   Pulse 100   Temp 97.8  F (36.6  C) (Tympanic)   Resp 14   Ht 1.575 m (5' 2\")   Wt 73.2 kg (161 lb 6.4 oz)   LMP 03/20/2020 (LMP Unknown)   BMI 29.52 kg/m   Estimated body mass index is 29.52 kg/m  as calculated from the following:    Height as of this encounter: 1.575 m (5' 2\").    Weight as of this encounter: 73.2 kg (161 lb 6.4 oz). .    Yohana Tejeda LPN    "

## 2020-11-16 NOTE — RESULT ENCOUNTER NOTE
Pt notified of below through [employer] interpretor.  Pt reports understanding.  Pt does not have further questions or concerns.    Renee Day   Ob/Gyn Clinic  RN

## 2020-11-16 NOTE — PROGRESS NOTES
"[facility]   OB/GYN Clinic     CC: Return OB      Subjective:     Kyra is a 30 year old  at 34w3d who presents for return OB visit. She reports feeling well. Denies vaginal bleeding or leaking, dysuria. +fetal movement. Some irregular contractions.      Objective:  /67 (BP Location: Right arm, Patient Position: Chair, Cuff Size: Adult Regular)   Pulse 100   Temp 97.8  F (36.6  C) (Tympanic)   Resp 14   Ht 1.575 m (5' 2\")   Wt 73.2 kg (161 lb 6.4 oz)   LMP 2020 (LMP Unknown)   BMI 29.52 kg/m       Physical Exam:  Gen: Pleasant, talkative female in no apparent distress   Respiratory: breathing comfortably on room air   Cardiac: Warm and well-perfused.   GI: Abd soft and non-tender, gravid  MSK: Grossly normal movement of all four extremities  Psych: mood and affect bright   Lower extremity: edema not present      See OB flowsheet      Assessment/Plan:   30 year old  at 34w3d who presents for follow-up OB visit.   1) New OB labs nl. Failed GCT, passed GTT.   2) Genetics testing: NIPT WNL, AFP nl   3) s/p L2 with MFM consult given hx of termination at 26w for microcephaly, on folic acid 1 mg daily  4) Medication review: no changes, continue prenatal vitamin   5) Immunizations: s/p flu and tdap   6) Marginal cord; s/p serial growth planned, resolved marginal cord     Return to clinic in 2 weeks.      Cathleen Nagy MD  OB/GYN      "

## 2020-11-30 ENCOUNTER — PRENATAL OFFICE VISIT (OUTPATIENT)
Dept: OBGYN | Facility: CLINIC | Age: 30
End: 2020-11-30
Payer: COMMERCIAL

## 2020-11-30 VITALS
TEMPERATURE: 97.6 F | DIASTOLIC BLOOD PRESSURE: 67 MMHG | HEART RATE: 96 BPM | BODY MASS INDEX: 30.9 KG/M2 | SYSTOLIC BLOOD PRESSURE: 103 MMHG | HEIGHT: 62 IN | WEIGHT: 167.9 LBS | RESPIRATION RATE: 16 BRPM

## 2020-11-30 DIAGNOSIS — Z34.83 PRENATAL CARE, SUBSEQUENT PREGNANCY IN THIRD TRIMESTER: Primary | ICD-10-CM

## 2020-11-30 PROCEDURE — 99207 PR PRENATAL VISIT: CPT | Performed by: OBSTETRICS & GYNECOLOGY

## 2020-11-30 PROCEDURE — 87653 STREP B DNA AMP PROBE: CPT | Performed by: OBSTETRICS & GYNECOLOGY

## 2020-11-30 ASSESSMENT — MIFFLIN-ST. JEOR: SCORE: 1424.84

## 2020-11-30 NOTE — NURSING NOTE
"Initial /67 (BP Location: Right arm, Patient Position: Chair, Cuff Size: Adult Regular)   Pulse 96   Temp 97.6  F (36.4  C) (Tympanic)   Resp 16   Ht 1.575 m (5' 2\")   Wt 76.2 kg (167 lb 14.4 oz)   LMP 03/20/2020 (LMP Unknown)   Breastfeeding No   BMI 30.71 kg/m   Estimated body mass index is 30.71 kg/m  as calculated from the following:    Height as of this encounter: 1.575 m (5' 2\").    Weight as of this encounter: 76.2 kg (167 lb 14.4 oz). .    Ifrah Zuniga CMA    "

## 2020-12-01 LAB
GP B STREP DNA SPEC QL NAA+PROBE: NEGATIVE
SPECIMEN SOURCE: NORMAL

## 2020-12-07 ENCOUNTER — PRENATAL OFFICE VISIT (OUTPATIENT)
Dept: OBGYN | Facility: CLINIC | Age: 30
End: 2020-12-07
Payer: COMMERCIAL

## 2020-12-07 VITALS
BODY MASS INDEX: 30.91 KG/M2 | SYSTOLIC BLOOD PRESSURE: 115 MMHG | HEART RATE: 116 BPM | WEIGHT: 169 LBS | TEMPERATURE: 96.7 F | DIASTOLIC BLOOD PRESSURE: 74 MMHG

## 2020-12-07 DIAGNOSIS — Z34.93 PRENATAL CARE, THIRD TRIMESTER: Primary | ICD-10-CM

## 2020-12-07 LAB
SPECIMEN SOURCE: NORMAL
WET PREP SPEC: NORMAL

## 2020-12-07 PROCEDURE — 87210 SMEAR WET MOUNT SALINE/INK: CPT | Performed by: OBSTETRICS & GYNECOLOGY

## 2020-12-07 PROCEDURE — 99207 PR PRENATAL VISIT: CPT | Performed by: OBSTETRICS & GYNECOLOGY

## 2020-12-07 NOTE — PROGRESS NOTES
[facility]   OB/GYN Clinic     CC: Return OB      Subjective:     Kyra is a 30 year old  at 37w3d who presents for return OB visit. She reports feeling well. Denies vaginal bleeding or leaking, dysuria. +fetal movement. Some irregular contractions. Has had an increase in watery discharge, not constant, noticing here and there.     Objective:  /74 (BP Location: Right arm, Patient Position: Chair, Cuff Size: Adult Regular)   Pulse 116   Temp 96.7  F (35.9  C) (Tympanic)   Wt 76.7 kg (169 lb)   LMP 2020 (LMP Unknown)   Breastfeeding No   BMI 30.91 kg/m       Physical Exam:  Gen: Pleasant, talkative female in no apparent distress   Respiratory: breathing comfortably on room air   Cardiac: Warm and well-perfused.   GI: Abd soft and non-tender, gravid  MSK: Grossly normal movement of all four extremities  Psych: mood and affect bright   Lower extremity: edema not present      FH: 37  Doptones: 140s   SVE: 1-2/60/-2     Assessment/Plan:   30 year old  at 37w3d who presents for follow-up OB visit.   1) New OB labs nl. Failed GCT, passed GTT. GBS collected.  2) Genetics testing: NIPT WNL, AFP nl   3) s/p L2 with MFM consult given hx of termination at 26w for microcephaly, on folic acid 1 mg daily  4) Medication review: no changes, continue prenatal vitamin   5) Immunizations: s/p flu and tdap   6) Marginal cord; s/p serial growth planned, resolved marginal cord     Return to clinic in 1 weeks.     Edith Hammond MD   2020 1:47 PM

## 2020-12-07 NOTE — NURSING NOTE
"Initial /74 (BP Location: Right arm, Patient Position: Chair, Cuff Size: Adult Regular)   Pulse 116   Temp 96.7  F (35.9  C) (Tympanic)   Wt 76.7 kg (169 lb)   LMP 03/20/2020 (LMP Unknown)   Breastfeeding No   BMI 30.91 kg/m   Estimated body mass index is 30.91 kg/m  as calculated from the following:    Height as of 11/30/20: 1.575 m (5' 2\").    Weight as of this encounter: 76.7 kg (169 lb). .    Tahmina Black MA    "

## 2020-12-14 ENCOUNTER — PRENATAL OFFICE VISIT (OUTPATIENT)
Dept: OBGYN | Facility: CLINIC | Age: 30
End: 2020-12-14
Payer: COMMERCIAL

## 2020-12-14 VITALS
BODY MASS INDEX: 31.28 KG/M2 | HEART RATE: 106 BPM | DIASTOLIC BLOOD PRESSURE: 70 MMHG | SYSTOLIC BLOOD PRESSURE: 112 MMHG | TEMPERATURE: 96.7 F | WEIGHT: 171 LBS

## 2020-12-14 DIAGNOSIS — Z34.93 PRENATAL CARE, THIRD TRIMESTER: Primary | ICD-10-CM

## 2020-12-14 PROCEDURE — 99207 PR PRENATAL VISIT: CPT | Performed by: OBSTETRICS & GYNECOLOGY

## 2020-12-14 PROCEDURE — 59426 ANTEPARTUM CARE ONLY: CPT | Performed by: OBSTETRICS & GYNECOLOGY

## 2020-12-14 NOTE — NURSING NOTE
"Initial /70 (BP Location: Right arm, Patient Position: Chair, Cuff Size: Adult Regular)   Pulse 106   Temp 96.7  F (35.9  C) (Tympanic)   Wt 77.6 kg (171 lb)   LMP 03/20/2020 (LMP Unknown)   BMI 31.28 kg/m   Estimated body mass index is 31.28 kg/m  as calculated from the following:    Height as of 11/30/20: 1.575 m (5' 2\").    Weight as of this encounter: 77.6 kg (171 lb). .    Tahmina Black MA    "

## 2020-12-14 NOTE — PROGRESS NOTES
[facility]   OB/GYN Clinic     CC: Return OB      Subjective:     Kyra is a 30 year old  at 38w3d who presents for return OB visit. She reports feeling well. Denies vaginal bleeding or leaking, dysuria. +fetal movement. Some irregular contractions.      Objective:  /70 (BP Location: Right arm, Patient Position: Chair, Cuff Size: Adult Regular)   Pulse 106   Temp 96.7  F (35.9  C) (Tympanic)   Wt 77.6 kg (171 lb)   LMP 2020 (LMP Unknown)   BMI 31.28 kg/m       Physical Exam:  Gen: Pleasant, talkative female in no apparent distress   Respiratory: breathing comfortably on room air   Cardiac: Warm and well-perfused.   GI: Abd soft and non-tender, gravid  MSK: Grossly normal movement of all four extremities  Psych: mood and affect bright   Lower extremity: edema not present      FH: 38  Doptones: 140s  SVE: declined    Assessment/Plan:   30 year old  at 38w3d who presents for follow-up OB visit.   1) New OB labs nl. Failed GCT, passed GTT. GBS neg  2) Genetics testing: NIPT WNL, AFP nl   3) s/p L2 with MFM consult given hx of termination at 26w for microcephaly, on folic acid 1 mg daily  4) Medication review: no changes, continue prenatal vitamin   5) Immunizations: s/p flu and tdap   6) Marginal cord; s/p serial growth, resolved marginal cord     Return to clinic in 1 weeks.      Edith Hammnod MD   2020 10:07 AM

## 2020-12-16 ENCOUNTER — ANESTHESIA (OUTPATIENT)
Dept: OBGYN | Facility: CLINIC | Age: 30
End: 2020-12-16
Payer: COMMERCIAL

## 2020-12-16 ENCOUNTER — HOSPITAL ENCOUNTER (INPATIENT)
Facility: CLINIC | Age: 30
LOS: 3 days | Discharge: HOME | End: 2020-12-19
Attending: OBSTETRICS & GYNECOLOGY | Admitting: OBSTETRICS & GYNECOLOGY
Payer: COMMERCIAL

## 2020-12-16 ENCOUNTER — NURSE TRIAGE (OUTPATIENT)
Dept: NURSING | Facility: CLINIC | Age: 30
End: 2020-12-16

## 2020-12-16 ENCOUNTER — ANESTHESIA (OUTPATIENT)
Dept: SURGERY | Facility: CLINIC | Age: 30
End: 2020-12-16
Payer: COMMERCIAL

## 2020-12-16 ENCOUNTER — ANESTHESIA EVENT (OUTPATIENT)
Dept: SURGERY | Facility: CLINIC | Age: 30
End: 2020-12-16
Payer: COMMERCIAL

## 2020-12-16 ENCOUNTER — ANESTHESIA EVENT (OUTPATIENT)
Dept: OBGYN | Facility: CLINIC | Age: 30
End: 2020-12-16
Payer: COMMERCIAL

## 2020-12-16 DIAGNOSIS — D62 ANEMIA DUE TO BLOOD LOSS, ACUTE: ICD-10-CM

## 2020-12-16 DIAGNOSIS — Z98.890 S/P EXPLORATORY LAPAROTOMY: ICD-10-CM

## 2020-12-16 LAB
ABO + RH BLD: NORMAL
APTT PPP: 27 SEC (ref 22–37)
APTT PPP: 29 SEC (ref 22–37)
BLD GP AB SCN SERPL QL: NORMAL
BLD PROD TYP BPU: NORMAL
BLD UNIT ID BPU: 0
BLOOD BANK CMNT PATIENT-IMP: NORMAL
BLOOD PRODUCT CODE: NORMAL
BPU ID: NORMAL
ERYTHROCYTE [DISTWIDTH] IN BLOOD BY AUTOMATED COUNT: 13.8 % (ref 10–15)
ERYTHROCYTE [DISTWIDTH] IN BLOOD BY AUTOMATED COUNT: 13.8 % (ref 10–15)
ERYTHROCYTE [DISTWIDTH] IN BLOOD BY AUTOMATED COUNT: 15.1 % (ref 10–15)
FIBRINOGEN PPP-MCNC: 279 MG/DL (ref 200–420)
FIBRINOGEN PPP-MCNC: 324 MG/DL (ref 200–420)
HCT VFR BLD AUTO: 24.5 % (ref 35–47)
HCT VFR BLD AUTO: 30.3 % (ref 35–47)
HCT VFR BLD AUTO: 39.5 % (ref 35–47)
HGB BLD-MCNC: 10 G/DL (ref 11.7–15.7)
HGB BLD-MCNC: 13.4 G/DL (ref 11.7–15.7)
HGB BLD-MCNC: 8.4 G/DL (ref 11.7–15.7)
INR PPP: 1.2 (ref 0.86–1.14)
INR PPP: 1.21 (ref 0.86–1.14)
LABORATORY COMMENT REPORT: NORMAL
MCH RBC QN AUTO: 29.9 PG (ref 26.5–33)
MCH RBC QN AUTO: 30 PG (ref 26.5–33)
MCH RBC QN AUTO: 30.1 PG (ref 26.5–33)
MCHC RBC AUTO-ENTMCNC: 33 G/DL (ref 31.5–36.5)
MCHC RBC AUTO-ENTMCNC: 33.9 G/DL (ref 31.5–36.5)
MCHC RBC AUTO-ENTMCNC: 34.3 G/DL (ref 31.5–36.5)
MCV RBC AUTO: 87 FL (ref 78–100)
MCV RBC AUTO: 89 FL (ref 78–100)
MCV RBC AUTO: 91 FL (ref 78–100)
NUM BPU REQUESTED: 2
NUM BPU REQUESTED: 5
PLATELET # BLD AUTO: 176 10E9/L (ref 150–450)
PLATELET # BLD AUTO: 178 10E9/L (ref 150–450)
PLATELET # BLD AUTO: 228 10E9/L (ref 150–450)
RBC # BLD AUTO: 2.81 10E12/L (ref 3.8–5.2)
RBC # BLD AUTO: 3.33 10E12/L (ref 3.8–5.2)
RBC # BLD AUTO: 4.45 10E12/L (ref 3.8–5.2)
SARS-COV-2 RNA SPEC QL NAA+PROBE: NEGATIVE
SARS-COV-2 RNA SPEC QL NAA+PROBE: NORMAL
SPECIMEN EXP DATE BLD: NORMAL
SPECIMEN EXP DATE BLD: NORMAL
SPECIMEN SOURCE: NORMAL
SPECIMEN SOURCE: NORMAL
T PALLIDUM AB SER QL: NONREACTIVE
TRANSFUSION STATUS PATIENT QL: NORMAL
WBC # BLD AUTO: 11.8 10E9/L (ref 4–11)
WBC # BLD AUTO: 16.1 10E9/L (ref 4–11)
WBC # BLD AUTO: 9.1 10E9/L (ref 4–11)

## 2020-12-16 PROCEDURE — 250N000009 HC RX 250: Performed by: OBSTETRICS & GYNECOLOGY

## 2020-12-16 PROCEDURE — 250N000011 HC RX IP 250 OP 636: Performed by: OBSTETRICS & GYNECOLOGY

## 2020-12-16 PROCEDURE — 0W3R0ZZ CONTROL BLEEDING IN GENITOURINARY TRACT, OPEN APPROACH: ICD-10-PCS | Performed by: OBSTETRICS & GYNECOLOGY

## 2020-12-16 PROCEDURE — 86900 BLOOD TYPING SEROLOGIC ABO: CPT | Performed by: OBSTETRICS & GYNECOLOGY

## 2020-12-16 PROCEDURE — 360N000015 HC SURGERY LEVEL 2 EA 15 ADDTL MIN: Performed by: OBSTETRICS & GYNECOLOGY

## 2020-12-16 PROCEDURE — 761N000007 HC RECOVERY PHASE 2 EACH 15 MINS: Performed by: OBSTETRICS & GYNECOLOGY

## 2020-12-16 PROCEDURE — 59410 OBSTETRICAL CARE: CPT | Performed by: OBSTETRICS & GYNECOLOGY

## 2020-12-16 PROCEDURE — P9016 RBC LEUKOCYTES REDUCED: HCPCS | Performed by: OBSTETRICS & GYNECOLOGY

## 2020-12-16 PROCEDURE — 0W3R7ZZ CONTROL BLEEDING IN GENITOURINARY TRACT, VIA NATURAL OR ARTIFICIAL OPENING: ICD-10-PCS | Performed by: OBSTETRICS & GYNECOLOGY

## 2020-12-16 PROCEDURE — 250N000003 HC SEVOFLURANE, EA 15 MIN: Performed by: OBSTETRICS & GYNECOLOGY

## 2020-12-16 PROCEDURE — 0UCG7ZZ EXTIRPATION OF MATTER FROM VAGINA, VIA NATURAL OR ARTIFICIAL OPENING: ICD-10-PCS | Performed by: OBSTETRICS & GYNECOLOGY

## 2020-12-16 PROCEDURE — 258N000003 HC RX IP 258 OP 636: Performed by: NURSE ANESTHETIST, CERTIFIED REGISTERED

## 2020-12-16 PROCEDURE — P9059 PLASMA, FRZ BETWEEN 8-24HOUR: HCPCS | Performed by: OBSTETRICS & GYNECOLOGY

## 2020-12-16 PROCEDURE — 250N000011 HC RX IP 250 OP 636: Performed by: NURSE ANESTHETIST, CERTIFIED REGISTERED

## 2020-12-16 PROCEDURE — 120N000001 HC R&B MED SURG/OB

## 2020-12-16 PROCEDURE — 0WQNXZZ REPAIR FEMALE PERINEUM, EXTERNAL APPROACH: ICD-10-PCS | Performed by: OBSTETRICS & GYNECOLOGY

## 2020-12-16 PROCEDURE — 85730 THROMBOPLASTIN TIME PARTIAL: CPT | Performed by: OBSTETRICS & GYNECOLOGY

## 2020-12-16 PROCEDURE — 86901 BLOOD TYPING SEROLOGIC RH(D): CPT | Performed by: OBSTETRICS & GYNECOLOGY

## 2020-12-16 PROCEDURE — 59899 UNLISTED PX MAT CARE&DLVR: CPT | Mod: 80 | Performed by: OBSTETRICS & GYNECOLOGY

## 2020-12-16 PROCEDURE — 272N000001 HC OR GENERAL SUPPLY STERILE: Performed by: OBSTETRICS & GYNECOLOGY

## 2020-12-16 PROCEDURE — 370N000002 HC ANESTHESIA TECHNICAL FEE, EACH ADDTL 15 MIN: Performed by: OBSTETRICS & GYNECOLOGY

## 2020-12-16 PROCEDURE — 49000 EXPLORATION OF ABDOMEN: CPT | Performed by: OBSTETRICS & GYNECOLOGY

## 2020-12-16 PROCEDURE — 85730 THROMBOPLASTIN TIME PARTIAL: CPT | Performed by: NURSE ANESTHETIST, CERTIFIED REGISTERED

## 2020-12-16 PROCEDURE — 86780 TREPONEMA PALLIDUM: CPT | Performed by: OBSTETRICS & GYNECOLOGY

## 2020-12-16 PROCEDURE — 85027 COMPLETE CBC AUTOMATED: CPT | Performed by: OBSTETRICS & GYNECOLOGY

## 2020-12-16 PROCEDURE — 250N000013 HC RX MED GY IP 250 OP 250 PS 637: Performed by: OBSTETRICS & GYNECOLOGY

## 2020-12-16 PROCEDURE — 85384 FIBRINOGEN ACTIVITY: CPT | Performed by: NURSE ANESTHETIST, CERTIFIED REGISTERED

## 2020-12-16 PROCEDURE — 85027 COMPLETE CBC AUTOMATED: CPT | Performed by: NURSE ANESTHETIST, CERTIFIED REGISTERED

## 2020-12-16 PROCEDURE — 36415 COLL VENOUS BLD VENIPUNCTURE: CPT | Performed by: NURSE ANESTHETIST, CERTIFIED REGISTERED

## 2020-12-16 PROCEDURE — 86923 COMPATIBILITY TEST ELECTRIC: CPT | Performed by: OBSTETRICS & GYNECOLOGY

## 2020-12-16 PROCEDURE — 761N000003 HC RECOVERY PHASE 1 LEVEL 2 FIRST HR: Performed by: OBSTETRICS & GYNECOLOGY

## 2020-12-16 PROCEDURE — 10D17Z9 MANUAL EXTRACTION OF PRODUCTS OF CONCEPTION, RETAINED, VIA NATURAL OR ARTIFICIAL OPENING: ICD-10-PCS | Performed by: OBSTETRICS & GYNECOLOGY

## 2020-12-16 PROCEDURE — 88307 TISSUE EXAM BY PATHOLOGIST: CPT | Mod: TC | Performed by: OBSTETRICS & GYNECOLOGY

## 2020-12-16 PROCEDURE — 258N000003 HC RX IP 258 OP 636: Performed by: OBSTETRICS & GYNECOLOGY

## 2020-12-16 PROCEDURE — 59899 UNLISTED PX MAT CARE&DLVR: CPT | Performed by: OBSTETRICS & GYNECOLOGY

## 2020-12-16 PROCEDURE — P9037 PLATE PHERES LEUKOREDU IRRAD: HCPCS | Performed by: OBSTETRICS & GYNECOLOGY

## 2020-12-16 PROCEDURE — 250N000013 HC RX MED GY IP 250 OP 250 PS 637: Performed by: NURSE ANESTHETIST, CERTIFIED REGISTERED

## 2020-12-16 PROCEDURE — 86850 RBC ANTIBODY SCREEN: CPT | Performed by: OBSTETRICS & GYNECOLOGY

## 2020-12-16 PROCEDURE — 360N000014 HC SURGERY LEVEL 2 1ST 30 MIN: Performed by: OBSTETRICS & GYNECOLOGY

## 2020-12-16 PROCEDURE — 250N000009 HC RX 250: Performed by: NURSE ANESTHETIST, CERTIFIED REGISTERED

## 2020-12-16 PROCEDURE — 88307 TISSUE EXAM BY PATHOLOGIST: CPT | Mod: 26 | Performed by: PATHOLOGY

## 2020-12-16 PROCEDURE — 360N000020 HC SURGERY LEVEL 3 1ST 30 MIN: Performed by: OBSTETRICS & GYNECOLOGY

## 2020-12-16 PROCEDURE — 250N000009 HC RX 250

## 2020-12-16 PROCEDURE — 36415 COLL VENOUS BLD VENIPUNCTURE: CPT | Performed by: OBSTETRICS & GYNECOLOGY

## 2020-12-16 PROCEDURE — C1726 CATH, BAL DIL, NON-VASCULAR: HCPCS | Performed by: OBSTETRICS & GYNECOLOGY

## 2020-12-16 PROCEDURE — 761N000004 HC RECOVERY PHASE 1 LEVEL 2 EA ADDTL HR: Performed by: OBSTETRICS & GYNECOLOGY

## 2020-12-16 PROCEDURE — 271N000001 HC OR GENERAL SUPPLY NON-STERILE: Performed by: OBSTETRICS & GYNECOLOGY

## 2020-12-16 PROCEDURE — 85384 FIBRINOGEN ACTIVITY: CPT | Performed by: OBSTETRICS & GYNECOLOGY

## 2020-12-16 PROCEDURE — 999N001063 HC STATISTIC THAWING COMPONENT: Performed by: OBSTETRICS & GYNECOLOGY

## 2020-12-16 PROCEDURE — 85610 PROTHROMBIN TIME: CPT | Performed by: OBSTETRICS & GYNECOLOGY

## 2020-12-16 PROCEDURE — 49000 EXPLORATION OF ABDOMEN: CPT | Mod: 80 | Performed by: OBSTETRICS & GYNECOLOGY

## 2020-12-16 PROCEDURE — 85610 PROTHROMBIN TIME: CPT | Performed by: NURSE ANESTHETIST, CERTIFIED REGISTERED

## 2020-12-16 PROCEDURE — U0003 INFECTIOUS AGENT DETECTION BY NUCLEIC ACID (DNA OR RNA); SEVERE ACUTE RESPIRATORY SYNDROME CORONAVIRUS 2 (SARS-COV-2) (CORONAVIRUS DISEASE [COVID-19]), AMPLIFIED PROBE TECHNIQUE, MAKING USE OF HIGH THROUGHPUT TECHNOLOGIES AS DESCRIBED BY CMS-2020-01-R: HCPCS | Performed by: OBSTETRICS & GYNECOLOGY

## 2020-12-16 PROCEDURE — 370N000001 HC ANESTHESIA TECHNICAL FEE, 1ST 30 MIN: Performed by: OBSTETRICS & GYNECOLOGY

## 2020-12-16 PROCEDURE — 360N000021 HC SURGERY LEVEL 3 EA 15 ADDTL MIN: Performed by: OBSTETRICS & GYNECOLOGY

## 2020-12-16 PROCEDURE — 0UQMXZZ REPAIR VULVA, EXTERNAL APPROACH: ICD-10-PCS | Performed by: OBSTETRICS & GYNECOLOGY

## 2020-12-16 RX ORDER — ALBUTEROL SULFATE 0.83 MG/ML
2.5 SOLUTION RESPIRATORY (INHALATION) EVERY 4 HOURS PRN
Status: DISCONTINUED | OUTPATIENT
Start: 2020-12-16 | End: 2020-12-16

## 2020-12-16 RX ORDER — NALOXONE HYDROCHLORIDE 0.4 MG/ML
0.4 INJECTION, SOLUTION INTRAMUSCULAR; INTRAVENOUS; SUBCUTANEOUS
Status: DISCONTINUED | OUTPATIENT
Start: 2020-12-16 | End: 2020-12-16

## 2020-12-16 RX ORDER — NALOXONE HYDROCHLORIDE 0.4 MG/ML
0.2 INJECTION, SOLUTION INTRAMUSCULAR; INTRAVENOUS; SUBCUTANEOUS
Status: ACTIVE | OUTPATIENT
Start: 2020-12-16 | End: 2020-12-17

## 2020-12-16 RX ORDER — SODIUM CHLORIDE 9 MG/ML
INJECTION, SOLUTION INTRAVENOUS CONTINUOUS PRN
Status: DISCONTINUED | OUTPATIENT
Start: 2020-12-16 | End: 2020-12-16

## 2020-12-16 RX ORDER — SODIUM CHLORIDE, SODIUM LACTATE, POTASSIUM CHLORIDE, CALCIUM CHLORIDE 600; 310; 30; 20 MG/100ML; MG/100ML; MG/100ML; MG/100ML
INJECTION, SOLUTION INTRAVENOUS CONTINUOUS
Status: DISCONTINUED | OUTPATIENT
Start: 2020-12-16 | End: 2020-12-16

## 2020-12-16 RX ORDER — ONDANSETRON 4 MG/1
4 TABLET, ORALLY DISINTEGRATING ORAL EVERY 30 MIN PRN
Status: DISCONTINUED | OUTPATIENT
Start: 2020-12-16 | End: 2020-12-16

## 2020-12-16 RX ORDER — NALBUPHINE HYDROCHLORIDE 10 MG/ML
2.5-5 INJECTION, SOLUTION INTRAMUSCULAR; INTRAVENOUS; SUBCUTANEOUS EVERY 6 HOURS PRN
Status: DISCONTINUED | OUTPATIENT
Start: 2020-12-16 | End: 2020-12-16

## 2020-12-16 RX ORDER — CARBOPROST TROMETHAMINE 250 UG/ML
INJECTION, SOLUTION INTRAMUSCULAR
Status: COMPLETED
Start: 2020-12-16 | End: 2020-12-16

## 2020-12-16 RX ORDER — NALOXONE HYDROCHLORIDE 0.4 MG/ML
0.2 INJECTION, SOLUTION INTRAMUSCULAR; INTRAVENOUS; SUBCUTANEOUS
Status: DISCONTINUED | OUTPATIENT
Start: 2020-12-16 | End: 2020-12-16

## 2020-12-16 RX ORDER — OXYTOCIN 10 [USP'U]/ML
INJECTION, SOLUTION INTRAMUSCULAR; INTRAVENOUS PRN
Status: DISCONTINUED | OUTPATIENT
Start: 2020-12-16 | End: 2020-12-16

## 2020-12-16 RX ORDER — OXYTOCIN 10 [USP'U]/ML
10 INJECTION, SOLUTION INTRAMUSCULAR; INTRAVENOUS
Status: DISCONTINUED | OUTPATIENT
Start: 2020-12-16 | End: 2020-12-19 | Stop reason: HOSPADM

## 2020-12-16 RX ORDER — HYDROCORTISONE 2.5 %
CREAM (GRAM) TOPICAL 3 TIMES DAILY PRN
Status: DISCONTINUED | OUTPATIENT
Start: 2020-12-16 | End: 2020-12-19 | Stop reason: HOSPADM

## 2020-12-16 RX ORDER — CARBOPROST TROMETHAMINE 250 UG/ML
250 INJECTION, SOLUTION INTRAMUSCULAR
Status: COMPLETED | OUTPATIENT
Start: 2020-12-16 | End: 2020-12-16

## 2020-12-16 RX ORDER — MISOPROSTOL 200 UG/1
TABLET ORAL PRN
Status: DISCONTINUED | OUTPATIENT
Start: 2020-12-16 | End: 2020-12-16

## 2020-12-16 RX ORDER — KETAMINE HYDROCHLORIDE 10 MG/ML
INJECTION, SOLUTION INTRAMUSCULAR; INTRAVENOUS PRN
Status: DISCONTINUED | OUTPATIENT
Start: 2020-12-16 | End: 2020-12-16

## 2020-12-16 RX ORDER — OXYTOCIN/0.9 % SODIUM CHLORIDE 30/500 ML
100-340 PLASTIC BAG, INJECTION (ML) INTRAVENOUS CONTINUOUS PRN
Status: COMPLETED | OUTPATIENT
Start: 2020-12-16 | End: 2020-12-16

## 2020-12-16 RX ORDER — FENTANYL CITRATE 50 UG/ML
25-50 INJECTION, SOLUTION INTRAMUSCULAR; INTRAVENOUS
Status: DISCONTINUED | OUTPATIENT
Start: 2020-12-16 | End: 2020-12-16

## 2020-12-16 RX ORDER — ONDANSETRON 2 MG/ML
4 INJECTION INTRAMUSCULAR; INTRAVENOUS EVERY 30 MIN PRN
Status: DISCONTINUED | OUTPATIENT
Start: 2020-12-16 | End: 2020-12-16

## 2020-12-16 RX ORDER — FENTANYL CITRATE 50 UG/ML
INJECTION, SOLUTION INTRAMUSCULAR; INTRAVENOUS PRN
Status: DISCONTINUED | OUTPATIENT
Start: 2020-12-16 | End: 2020-12-16

## 2020-12-16 RX ORDER — ACETAMINOPHEN 325 MG/1
975 TABLET ORAL ONCE
Status: DISCONTINUED | OUTPATIENT
Start: 2020-12-16 | End: 2020-12-16

## 2020-12-16 RX ORDER — AMOXICILLIN 250 MG
1 CAPSULE ORAL 2 TIMES DAILY
Status: DISCONTINUED | OUTPATIENT
Start: 2020-12-16 | End: 2020-12-19 | Stop reason: HOSPADM

## 2020-12-16 RX ORDER — FENTANYL CITRATE 50 UG/ML
50 INJECTION, SOLUTION INTRAMUSCULAR; INTRAVENOUS ONCE
Status: COMPLETED | OUTPATIENT
Start: 2020-12-16 | End: 2020-12-16

## 2020-12-16 RX ORDER — METHYLERGONOVINE MALEATE 0.2 MG/1
200 TABLET ORAL 3 TIMES DAILY
Status: DISCONTINUED | OUTPATIENT
Start: 2020-12-16 | End: 2020-12-17

## 2020-12-16 RX ORDER — METOPROLOL TARTRATE 1 MG/ML
1-2 INJECTION, SOLUTION INTRAVENOUS EVERY 5 MIN PRN
Status: DISCONTINUED | OUTPATIENT
Start: 2020-12-16 | End: 2020-12-16

## 2020-12-16 RX ORDER — DEXAMETHASONE SODIUM PHOSPHATE 4 MG/ML
INJECTION, SOLUTION INTRA-ARTICULAR; INTRALESIONAL; INTRAMUSCULAR; INTRAVENOUS; SOFT TISSUE PRN
Status: DISCONTINUED | OUTPATIENT
Start: 2020-12-16 | End: 2020-12-16

## 2020-12-16 RX ORDER — MISOPROSTOL 200 UG/1
800 TABLET ORAL
Status: DISCONTINUED | OUTPATIENT
Start: 2020-12-16 | End: 2020-12-19 | Stop reason: HOSPADM

## 2020-12-16 RX ORDER — METHYLERGONOVINE MALEATE 0.2 MG/ML
200 INJECTION INTRAVENOUS
Status: DISCONTINUED | OUTPATIENT
Start: 2020-12-16 | End: 2020-12-19 | Stop reason: HOSPADM

## 2020-12-16 RX ORDER — CEFAZOLIN SODIUM 1 G/3ML
INJECTION, POWDER, FOR SOLUTION INTRAMUSCULAR; INTRAVENOUS PRN
Status: DISCONTINUED | OUTPATIENT
Start: 2020-12-16 | End: 2020-12-16

## 2020-12-16 RX ORDER — NALOXONE HYDROCHLORIDE 0.4 MG/ML
0.4 INJECTION, SOLUTION INTRAMUSCULAR; INTRAVENOUS; SUBCUTANEOUS
Status: ACTIVE | OUTPATIENT
Start: 2020-12-16 | End: 2020-12-17

## 2020-12-16 RX ORDER — LIDOCAINE HYDROCHLORIDE 10 MG/ML
INJECTION, SOLUTION INFILTRATION; PERINEURAL PRN
Status: DISCONTINUED | OUTPATIENT
Start: 2020-12-16 | End: 2020-12-16

## 2020-12-16 RX ORDER — AMOXICILLIN 250 MG
2 CAPSULE ORAL 2 TIMES DAILY
Status: DISCONTINUED | OUTPATIENT
Start: 2020-12-16 | End: 2020-12-19 | Stop reason: HOSPADM

## 2020-12-16 RX ORDER — IBUPROFEN 800 MG/1
800 TABLET, FILM COATED ORAL
Status: DISCONTINUED | OUTPATIENT
Start: 2020-12-16 | End: 2020-12-16

## 2020-12-16 RX ORDER — CARBOPROST TROMETHAMINE 250 UG/ML
INJECTION, SOLUTION INTRAMUSCULAR PRN
Status: DISCONTINUED | OUTPATIENT
Start: 2020-12-16 | End: 2020-12-16 | Stop reason: HOSPADM

## 2020-12-16 RX ORDER — PHENYLEPHRINE HYDROCHLORIDE 10 MG/ML
INJECTION INTRAVENOUS PRN
Status: DISCONTINUED | OUTPATIENT
Start: 2020-12-16 | End: 2020-12-16

## 2020-12-16 RX ORDER — OXYTOCIN/0.9 % SODIUM CHLORIDE 30/500 ML
100 PLASTIC BAG, INJECTION (ML) INTRAVENOUS CONTINUOUS
Status: DISCONTINUED | OUTPATIENT
Start: 2020-12-16 | End: 2020-12-17

## 2020-12-16 RX ORDER — METHYLERGONOVINE MALEATE 0.2 MG/1
200 TABLET ORAL 3 TIMES DAILY
Status: DISCONTINUED | OUTPATIENT
Start: 2020-12-16 | End: 2020-12-16

## 2020-12-16 RX ORDER — TRANEXAMIC ACID 10 MG/ML
1 INJECTION, SOLUTION INTRAVENOUS EVERY 30 MIN PRN
Status: DISCONTINUED | OUTPATIENT
Start: 2020-12-16 | End: 2020-12-16

## 2020-12-16 RX ORDER — FENTANYL CITRATE 50 UG/ML
50 INJECTION, SOLUTION INTRAMUSCULAR; INTRAVENOUS
Status: DISCONTINUED | OUTPATIENT
Start: 2020-12-16 | End: 2020-12-19 | Stop reason: HOSPADM

## 2020-12-16 RX ORDER — BISACODYL 10 MG
10 SUPPOSITORY, RECTAL RECTAL DAILY PRN
Status: DISCONTINUED | OUTPATIENT
Start: 2020-12-18 | End: 2020-12-19 | Stop reason: HOSPADM

## 2020-12-16 RX ORDER — IBUPROFEN 600 MG/1
600 TABLET, FILM COATED ORAL EVERY 6 HOURS PRN
Status: DISCONTINUED | OUTPATIENT
Start: 2020-12-16 | End: 2020-12-19 | Stop reason: HOSPADM

## 2020-12-16 RX ORDER — OXYTOCIN 10 [USP'U]/ML
INJECTION, SOLUTION INTRAMUSCULAR; INTRAVENOUS
Status: DISCONTINUED
Start: 2020-12-16 | End: 2020-12-16 | Stop reason: HOSPADM

## 2020-12-16 RX ORDER — OXYTOCIN 10 [USP'U]/ML
10 INJECTION, SOLUTION INTRAMUSCULAR; INTRAVENOUS
Status: DISCONTINUED | OUTPATIENT
Start: 2020-12-16 | End: 2020-12-16

## 2020-12-16 RX ORDER — OXYTOCIN/0.9 % SODIUM CHLORIDE 30/500 ML
PLASTIC BAG, INJECTION (ML) INTRAVENOUS PRN
Status: DISCONTINUED | OUTPATIENT
Start: 2020-12-16 | End: 2020-12-16

## 2020-12-16 RX ORDER — ACETAMINOPHEN 325 MG/1
650 TABLET ORAL EVERY 4 HOURS PRN
Status: DISCONTINUED | OUTPATIENT
Start: 2020-12-16 | End: 2020-12-16

## 2020-12-16 RX ORDER — ACETAMINOPHEN 325 MG/1
650 TABLET ORAL EVERY 4 HOURS PRN
Status: DISCONTINUED | OUTPATIENT
Start: 2020-12-16 | End: 2020-12-19 | Stop reason: HOSPADM

## 2020-12-16 RX ORDER — TRANEXAMIC ACID 10 MG/ML
1 INJECTION, SOLUTION INTRAVENOUS EVERY 30 MIN PRN
Status: DISCONTINUED | OUTPATIENT
Start: 2020-12-16 | End: 2020-12-19 | Stop reason: HOSPADM

## 2020-12-16 RX ORDER — OXYCODONE HYDROCHLORIDE 5 MG/1
5 TABLET ORAL EVERY 4 HOURS PRN
Status: DISCONTINUED | OUTPATIENT
Start: 2020-12-16 | End: 2020-12-17

## 2020-12-16 RX ORDER — OXYTOCIN/0.9 % SODIUM CHLORIDE 30/500 ML
PLASTIC BAG, INJECTION (ML) INTRAVENOUS
Status: DISCONTINUED
Start: 2020-12-16 | End: 2020-12-16 | Stop reason: HOSPADM

## 2020-12-16 RX ORDER — ONDANSETRON 2 MG/ML
INJECTION INTRAMUSCULAR; INTRAVENOUS PRN
Status: DISCONTINUED | OUTPATIENT
Start: 2020-12-16 | End: 2020-12-16

## 2020-12-16 RX ORDER — OXYTOCIN/0.9 % SODIUM CHLORIDE 30/500 ML
340 PLASTIC BAG, INJECTION (ML) INTRAVENOUS CONTINUOUS PRN
Status: DISCONTINUED | OUTPATIENT
Start: 2020-12-16 | End: 2020-12-17

## 2020-12-16 RX ORDER — SODIUM CHLORIDE, SODIUM LACTATE, POTASSIUM CHLORIDE, CALCIUM CHLORIDE 600; 310; 30; 20 MG/100ML; MG/100ML; MG/100ML; MG/100ML
INJECTION, SOLUTION INTRAVENOUS CONTINUOUS PRN
Status: DISCONTINUED | OUTPATIENT
Start: 2020-12-16 | End: 2020-12-16

## 2020-12-16 RX ORDER — METHYLERGONOVINE MALEATE 0.2 MG/ML
200 INJECTION INTRAVENOUS
Status: COMPLETED | OUTPATIENT
Start: 2020-12-16 | End: 2020-12-16

## 2020-12-16 RX ORDER — EPHEDRINE SULFATE 50 MG/ML
5 INJECTION, SOLUTION INTRAMUSCULAR; INTRAVENOUS; SUBCUTANEOUS
Status: DISCONTINUED | OUTPATIENT
Start: 2020-12-16 | End: 2020-12-16

## 2020-12-16 RX ORDER — PROPOFOL 10 MG/ML
INJECTION, EMULSION INTRAVENOUS CONTINUOUS PRN
Status: DISCONTINUED | OUTPATIENT
Start: 2020-12-16 | End: 2020-12-16

## 2020-12-16 RX ORDER — CEFAZOLIN SODIUM 2 G/100ML
2 INJECTION, SOLUTION INTRAVENOUS EVERY 8 HOURS
Status: COMPLETED | OUTPATIENT
Start: 2020-12-16 | End: 2020-12-17

## 2020-12-16 RX ORDER — ONDANSETRON 2 MG/ML
4 INJECTION INTRAMUSCULAR; INTRAVENOUS EVERY 6 HOURS PRN
Status: DISCONTINUED | OUTPATIENT
Start: 2020-12-16 | End: 2020-12-16

## 2020-12-16 RX ORDER — PROPOFOL 10 MG/ML
INJECTION, EMULSION INTRAVENOUS PRN
Status: DISCONTINUED | OUTPATIENT
Start: 2020-12-16 | End: 2020-12-16

## 2020-12-16 RX ORDER — MODIFIED LANOLIN
OINTMENT (GRAM) TOPICAL
Status: DISCONTINUED | OUTPATIENT
Start: 2020-12-16 | End: 2020-12-19 | Stop reason: HOSPADM

## 2020-12-16 RX ADMIN — FENTANYL CITRATE 50 MCG: 50 INJECTION, SOLUTION INTRAMUSCULAR; INTRAVENOUS at 08:50

## 2020-12-16 RX ADMIN — IBUPROFEN 600 MG: 600 TABLET ORAL at 21:49

## 2020-12-16 RX ADMIN — SUGAMMADEX 200 MG: 100 INJECTION, SOLUTION INTRAVENOUS at 16:45

## 2020-12-16 RX ADMIN — PROPOFOL 140 MG: 10 INJECTION, EMULSION INTRAVENOUS at 15:31

## 2020-12-16 RX ADMIN — LIDOCAINE HYDROCHLORIDE 50 MG: 10 INJECTION, SOLUTION INFILTRATION; PERINEURAL at 15:31

## 2020-12-16 RX ADMIN — ROCURONIUM BROMIDE 10 MG: 10 INJECTION INTRAVENOUS at 15:30

## 2020-12-16 RX ADMIN — DEXAMETHASONE SODIUM PHOSPHATE 8 MG: 4 INJECTION, SOLUTION INTRA-ARTICULAR; INTRALESIONAL; INTRAMUSCULAR; INTRAVENOUS; SOFT TISSUE at 11:30

## 2020-12-16 RX ADMIN — METHYLERGONOVINE 200 MCG: 0.2 TABLET ORAL at 23:31

## 2020-12-16 RX ADMIN — Medication 100 ML/HR: at 20:11

## 2020-12-16 RX ADMIN — FENTANYL CITRATE 50 MCG: 50 INJECTION, SOLUTION INTRAMUSCULAR; INTRAVENOUS at 11:30

## 2020-12-16 RX ADMIN — ROCURONIUM BROMIDE 25 MG: 10 INJECTION INTRAVENOUS at 16:11

## 2020-12-16 RX ADMIN — CARBOPROST TROMETHAMINE 250 MCG: 250 INJECTION, SOLUTION INTRAMUSCULAR at 11:49

## 2020-12-16 RX ADMIN — CARBOPROST TROMETHAMINE 250 MCG: 250 INJECTION, SOLUTION INTRAMUSCULAR at 15:16

## 2020-12-16 RX ADMIN — TRANEXAMIC ACID 1 G: 10 INJECTION, SOLUTION INTRAVENOUS at 11:51

## 2020-12-16 RX ADMIN — SODIUM CHLORIDE 500 ML: 9 INJECTION, SOLUTION INTRAVENOUS at 14:25

## 2020-12-16 RX ADMIN — DEXAMETHASONE SODIUM PHOSPHATE 8 MG: 4 INJECTION, SOLUTION INTRA-ARTICULAR; INTRALESIONAL; INTRAMUSCULAR; INTRAVENOUS; SOFT TISSUE at 16:45

## 2020-12-16 RX ADMIN — FENTANYL CITRATE 50 MCG: 50 INJECTION, SOLUTION INTRAMUSCULAR; INTRAVENOUS at 16:41

## 2020-12-16 RX ADMIN — FENTANYL CITRATE 50 MCG: 50 INJECTION, SOLUTION INTRAMUSCULAR; INTRAVENOUS at 11:25

## 2020-12-16 RX ADMIN — FENTANYL CITRATE 100 MCG: 50 INJECTION, SOLUTION INTRAMUSCULAR; INTRAVENOUS at 15:31

## 2020-12-16 RX ADMIN — FENTANYL CITRATE 50 MCG: 50 INJECTION, SOLUTION INTRAMUSCULAR; INTRAVENOUS at 16:11

## 2020-12-16 RX ADMIN — IBUPROFEN 600 MG: 600 TABLET ORAL at 13:50

## 2020-12-16 RX ADMIN — SODIUM CHLORIDE: 0.9 INJECTION, SOLUTION INTRAVENOUS at 15:16

## 2020-12-16 RX ADMIN — ONDANSETRON 4 MG: 2 INJECTION INTRAMUSCULAR; INTRAVENOUS at 11:30

## 2020-12-16 RX ADMIN — ROCURONIUM BROMIDE 40 MG: 10 INJECTION INTRAVENOUS at 15:31

## 2020-12-16 RX ADMIN — ONDANSETRON 4 MG: 2 INJECTION INTRAMUSCULAR; INTRAVENOUS at 16:45

## 2020-12-16 RX ADMIN — LIDOCAINE HYDROCHLORIDE 100 MG: 10 INJECTION, SOLUTION INFILTRATION; PERINEURAL at 11:25

## 2020-12-16 RX ADMIN — METHYLERGONOVINE 200 MCG: 0.2 TABLET ORAL at 14:13

## 2020-12-16 RX ADMIN — SODIUM CHLORIDE, POTASSIUM CHLORIDE, SODIUM LACTATE AND CALCIUM CHLORIDE: 600; 310; 30; 20 INJECTION, SOLUTION INTRAVENOUS at 11:22

## 2020-12-16 RX ADMIN — Medication 340 ML/HR: at 11:09

## 2020-12-16 RX ADMIN — KETAMINE HYDROCHLORIDE 10 MG: 10 INJECTION INTRAMUSCULAR; INTRAVENOUS at 11:30

## 2020-12-16 RX ADMIN — KETAMINE HYDROCHLORIDE 50 MG: 10 INJECTION INTRAMUSCULAR; INTRAVENOUS at 16:24

## 2020-12-16 RX ADMIN — DOCUSATE SODIUM AND SENNOSIDES 1 TABLET: 8.6; 5 TABLET ORAL at 23:31

## 2020-12-16 RX ADMIN — METHYLERGONOVINE MALEATE 200 MCG: 0.2 INJECTION INTRAMUSCULAR; INTRAVENOUS at 11:39

## 2020-12-16 RX ADMIN — FENTANYL CITRATE 25 MCG: 50 INJECTION, SOLUTION INTRAMUSCULAR; INTRAVENOUS at 18:11

## 2020-12-16 RX ADMIN — CEFAZOLIN SODIUM 2 G: 2 INJECTION, SOLUTION INTRAVENOUS at 15:40

## 2020-12-16 RX ADMIN — MIDAZOLAM 2 MG: 1 INJECTION INTRAMUSCULAR; INTRAVENOUS at 15:29

## 2020-12-16 RX ADMIN — FENTANYL CITRATE 25 MCG: 50 INJECTION, SOLUTION INTRAMUSCULAR; INTRAVENOUS at 17:53

## 2020-12-16 RX ADMIN — CEFAZOLIN SODIUM 2 G: 2 INJECTION, SOLUTION INTRAVENOUS at 23:28

## 2020-12-16 RX ADMIN — Medication 200 ML: at 11:20

## 2020-12-16 RX ADMIN — SODIUM CHLORIDE, POTASSIUM CHLORIDE, SODIUM LACTATE AND CALCIUM CHLORIDE: 600; 310; 30; 20 INJECTION, SOLUTION INTRAVENOUS at 15:24

## 2020-12-16 RX ADMIN — FENTANYL CITRATE 50 MCG: 50 INJECTION, SOLUTION INTRAMUSCULAR; INTRAVENOUS at 16:46

## 2020-12-16 RX ADMIN — CEFAZOLIN 2 G: 1 INJECTION, POWDER, FOR SOLUTION INTRAMUSCULAR; INTRAVENOUS at 11:28

## 2020-12-16 RX ADMIN — ACETAMINOPHEN 650 MG: 325 TABLET, FILM COATED ORAL at 13:50

## 2020-12-16 RX ADMIN — MISOPROSTOL 1000 MCG: 200 TABLET ORAL at 11:45

## 2020-12-16 RX ADMIN — PROPOFOL 300 MCG/KG/MIN: 10 INJECTION, EMULSION INTRAVENOUS at 11:25

## 2020-12-16 RX ADMIN — PHENYLEPHRINE HYDROCHLORIDE 200 MCG: 10 INJECTION INTRAVENOUS at 16:29

## 2020-12-16 RX ADMIN — SODIUM CHLORIDE: 0.9 INJECTION, SOLUTION INTRAVENOUS at 16:25

## 2020-12-16 RX ADMIN — Medication 300 ML: at 12:11

## 2020-12-16 RX ADMIN — FENTANYL CITRATE 50 MCG: 50 INJECTION INTRAMUSCULAR; INTRAVENOUS at 20:12

## 2020-12-16 RX ADMIN — OXYCODONE HYDROCHLORIDE 5 MG: 5 TABLET ORAL at 21:49

## 2020-12-16 RX ADMIN — SODIUM CHLORIDE: 0.9 INJECTION, SOLUTION INTRAVENOUS at 12:11

## 2020-12-16 RX ADMIN — OXYTOCIN 30 UNITS: 10 INJECTION, SOLUTION INTRAMUSCULAR; INTRAVENOUS at 16:18

## 2020-12-16 NOTE — ANESTHESIA PREPROCEDURE EVALUATION
"Anesthesia Pre-Procedure Evaluation    Patient: Kyra Torres   MRN: [id number] : 1990          Preoperative Diagnosis: * No surgery found *        Past Medical History:   Diagnosis Date     NO ACTIVE PROBLEMS      Past Surgical History:   Procedure Laterality Date     D & C      Termination 2019-Delivery and then D&C       Anesthesia Evaluation       history and physical reviewed . Pt has had prior anesthetic. Type: MAC    No history of anesthetic complications          ROS/MED HX    ENT/Pulmonary:  - neg pulmonary ROS     Neurologic:  - neg neurologic ROS     Cardiovascular:  - neg cardiovascular ROS       METS/Exercise Tolerance:     Hematologic:  - neg hematologic  ROS       Musculoskeletal:  - neg musculoskeletal ROS       GI/Hepatic:  - neg GI/hepatic ROS       Renal/Genitourinary:  - ROS Renal section negative       Endo:  - neg endo ROS       Psychiatric:  - neg psychiatric ROS       Infectious Disease:  - neg infectious disease ROS       Malignancy:      - no malignancy   Other:                     neg OB ROS            Physical Exam  Normal systems: cardiovascular, pulmonary and dental    Airway   Mallampati: I  TM distance: > 3 FB  Neck ROM: full  Mouth opening: > 3 cm    Dental     Cardiovascular       Pulmonary     Other findings: Needs         Lab Results   Component Value Date    WBC 6.7 2020    HGB 12.8 2020    HCT 38.3 2020     2020       Preop Vitals  BP Readings from Last 3 Encounters:   20 117/64   20 112/70   20 115/74    Pulse Readings from Last 3 Encounters:   20 83   20 106   20 116      Resp Readings from Last 3 Encounters:   20 18   20 16   20 14    SpO2 Readings from Last 3 Encounters:   07/10/19 96%   19 98%   19 96%      Temp Readings from Last 1 Encounters:   20 36.8  C (98.3  F) (Oral)    Ht Readings from Last 1 Encounters:   20 1.575 m (5' 2\")      Wt Readings " "from Last 1 Encounters:   12/14/20 77.6 kg (171 lb)    Estimated body mass index is 31.28 kg/m  as calculated from the following:    Height as of 11/30/20: 1.575 m (5' 2\").    Weight as of 12/14/20: 77.6 kg (171 lb).       Anesthesia Plan      History & Physical Review  History and physical reviewed and following examination; no interval change.    ASA Status:  2 .  OB Epidural Asa: 2       Plan for Epidural   PONV prophylaxis:  Ondansetron (or other 5HT-3) and Dexamethasone or Solumedrol  Needs  - [facility]        Postoperative Care  Postoperative pain management:  Neuraxial analgesia.      Consents  Anesthetic plan, risks, benefits and alternatives discussed with:  Patient and Other (See Comment)..                 Sadia Addison, APRN CRNA  "

## 2020-12-16 NOTE — ANESTHESIA PROCEDURE NOTES
Airway   Date/Time: 12/16/2020 3:32 PM   Patient location during procedure: OR    Staff -   CRNA: Heath Ling APRN CRNA  Performed By: CRNA    Indications and Patient Condition  Indications for airway management: marcus-procedural  Induction type:RSIMask difficulty assessment: 0 - not attempted    Final Airway Details  Final airway type: endotracheal airway  Successful airway:ETT - single and Oral  Endotracheal Airway Details   ETT size (mm): 7.0  Cuffed: yes  Successful intubation technique: video laryngoscopy  Grade View of Cords: 1  Adjucts: stylet  Measured from: gums/teeth  Secured at (cm): 21  Secured with: silk tape  Bite block used: None    Post intubation assessment   Placement verified by: capnometry   Number of attempts at approach: 1  Number of other approaches attempted: 0  Secured with:silk tape  Ease of procedure: easy  Dentition: Intact

## 2020-12-16 NOTE — L&D DELIVERY NOTE
Delivery Summary    Kyra Torres MRN# [id number]   Age: 30 year old YOB: 1990     ASSESSMENT & PLAN:       Kyra Torres is a 30 year old  presented at 38 weeks 5 days who was admitted to L&D with labor and SROM. Her pregnancy was complicated by history of prior pregnancy with microcephaly and subsequent termination at 26 weeks. She progressed spontaneously through labor until complete dilation. Patient did not labor down. She pushed effectively and delivered a viable female infant with Apgars 8/8 over a second degree laceration. Delayed cord clamping was performed. Cord was clamped and cut. Placenta was not delivering spontaneously. Umbilical cord started to avulse. Upon further inspection, suspected velamentous cord insertion identified with complete retention of the placenta. Patient with moderate amount of active bleeding. Due to patient discomfort, unable to manually remove placenta at bedside. Decision made to proceed to the OR for removal. QBL at time of delivery prior to OR was 400cc. In operating room, patient underwent an exam under anesthesia, manual removal of the placenta. Uterine atony with hemorrhage encountered. All uterotonic medications administered and finally an intrauterine tamponade balloon was placed. Perineal laceration was repaired. Total EBL from OR was 900cc. See operative report for full details. Patient was transferred to post partum unit for close observation.  While in postpartum, she experienced recurring active bleeding from around the uterine tamponade, had decompensation in her vitals in spite of 2 Units PRBC; she was then taken to the OR, underwent exploratory laparotomy with B-lopez sutures placed due to persistant intractable uterine atony; the tamponade balloon was removed and her bleeding markedly improved.  She subsequently received 2 additional units PRBC, 2 U FFP and 1 U platelets.        Brian Female-Kyra [[id number]]    Labor Event Times    Labor onset date:  "20 Onset time:  6:20 AM   Dilation complete date: 20 Complete time: 10:40 AM   Start pushing date/time: 2020 1045      Labor Length    1st Stage (hrs): 4 (min): 20   2nd Stage (hrs): 0 (min): 20   3rd Stage (hrs): 0 (min): 7      Labor Events     labor?: No   steroids: None  Labor Type: Spontaneous     Rupture date/time: 20 0620   Rupture type: Spontaneous rupture of membranes prior to induction  Fluid color: Clear  Fluid odor: Normal     Delivery/Placenta Date and Time    Delivery Date: 20 Delivery Time: 11:00 AM   Placenta Date/Time: 2020 11:07 AM     Vaginal Counts     Initial count performed by 2 team members:  Two Team Members   Abbie Black Dominik       Needles Suture [employer]ong[employer] Instruments   Initial counts 2  5    Added to count 2      Final counts 4  5    Placed during labor Accounted for at the end of labor   NA NA   NA NA   NA NA    Final count performed by 2 team members:  Two Team Members   Abbie Colón      Final count correct?: Yes     Apgars    Living status: Living   1 Minute 5 Minute 10 Minute 15 Minute 20 Minute   Skin color: 1  1       Heart rate: 2  2       Reflex irritability: 1  1       Muscle tone: 2  2       Respiratory effort: 2  2       Total: 8  8       Apgars assigned by: ELIZABETH     Cord    Vessels: 3 Vessels Complications: None   Cord Blood Disposition: Lab Gases Sent?: No      Section Resuscitation    Methods: None      Measurements    Weight: 7 lb 1 oz Length: 1' 8\"   Head circumference: 33.7 cm       Skin to Skin and Feeding Plan    Skin to skin initiation date/time: 1841    Skin to skin with: Mother  Skin to skin end date/time: 1841       Labor Events and Shoulder Dystocia    Fetal Tracing Prior to Delivery: Category 1  Shoulder dystocia present?: Neg     Delivery (Maternal) (Provider to Complete) ([id number])    Episiotomy: None  Perineal lacerations: 2nd Repaired?: Yes   Est. blood loss (mL): " 500     Blood Loss  Mother: Marline Torres I #[id number]   Start of Mother's Information    IO Blood Loss  12/16/20 0620 - 12/16/20 1244    EBL (mL) Hospital Encounter 500 mL    Total  500 mL         End of Mother's Information  Mother: Marline Torres I #[id number]          Delivery - Provider to Complete ([id number])    Delivering clinician: Malathi Colón DO  Attempted Delivery Types (Choose all that apply): Spontaneous Vaginal Delivery  Delivery Type (Choose the 1 that will go to the Birth History): Vaginal, Spontaneous                                 Placenta    Delayed Cord Clamping: Done  Date/Time: 12/16/2020 11:07 AM  Removal: Manual Removal, Curettage  Comments: Mannual removal in the OR with uterine curettage  Disposition: Pathology           Anesthesia    Method: INTRAVENOUS REGIONAL                Presentation and Position    Presentation: Vertex    Position: Middle Occiput Anterior                 Malathi Colón DO

## 2020-12-16 NOTE — PROGRESS NOTES
"[facility] OB/GYN Department    Labor Note    Date of Admission: 12/16/2020  Name: Kyra Torres    Subjective:    Called to evaluate patient due to persistent bleeding around Bakri balloon. Pad with 180cc blood, about 30cc in Bakri collecting bag. Patient uncomfortable with exams, otherwise resting comfortably.    Objective:    Vital signs:  Temp: 97.8  F (36.6  C) Temp src: Oral BP: (!) 92/16 Pulse: 82   Resp: 20 SpO2: 100 %          Estimated body mass index is 31.28 kg/m  as calculated from the following:    Height as of 11/30/20: 1.575 m (5' 2\").    Weight as of 12/14/20: 77.6 kg (171 lb).    Sterile Vaginal Exam:  Moderate amount of active bleeding around Bakri balloon, another 50cc blood clot removed from vagina on exam. Additional 150cc added to Bakri baloon, total at 500cc.     Assessment and Plan:    Discussed findings with patient. We filled Bakri balloon with additional 150cc saline in order to try to tamponade bleeding. Patient has been tachycardic and now blood pressure decreasing, total EBL close to 1,500cc. Would recommend blood transfusion at this time. Patient and  hesitant but agreeable, consent obtained. Discussed that if bleeding continues, further surgical intervention will likely be necessary. Discussed possible need for hysterectomy. Patient would like all other intervention possible prior to this.     Continue Pitocin and Methergine PO. Close hemodynamic monitoring. 1U pRBC ordered.       Malathi Colón DO"

## 2020-12-16 NOTE — ANESTHESIA CARE TRANSFER NOTE
Patient: Kyra Torres    Procedure(s):  EXPLORATORY LAPAROTOMY,  placement of b lopez sutures, removal of gloria, repair of marcus uretheral laceration    Diagnosis: Postpartum atony of uterus with hemorrhage [O72.1]  Diagnosis Additional Information: No value filed.    Anesthesia Type:   General     Note:  Airway :Oral Airway and Nasal Cannula  Patient transferred to:PACU  Handoff Report: Identifed the Patient, Identified the Reponsible Provider, Reviewed the pertinent medical history, Discussed the surgical course, Reviewed Intra-OP anesthesia mangement and issues during anesthesia, Set expectations for post-procedure period and Allowed opportunity for questions and acknowledgement of understanding      Vitals: (Last set prior to Anesthesia Care Transfer)    CRNA VITALS  12/16/2020 1627 - 12/16/2020 1703      12/16/2020             Pulse:  101    SpO2:  100 %                Electronically Signed By: SANTOS Hernandez CRNA  December 16, 2020  5:03 PM

## 2020-12-16 NOTE — BRIEF OP NOTE
[facility]     Brief Operative Note    Pre-operative diagnosis: Retained placenta, unspecified portion of placenta [O73.0]  Post-operative diagnosis Post partum hemorrhage, retained placenta    Procedure: Procedure(s):  Exam under anesthesia, Manual removal of placenta, uterine curettage, placement of intrauterine tamponade balloon, repair of perineal laceraction.  Surgeon: Surgeon(s) and Role:     * Malathi Colón DO - Primary     * Emeka Forman PA-C - Assisting  Anesthesia: Monitor Anesthesia Care   Estimated blood loss: 900cc  Drains: Flaherty to gravity, Bakri balloon (intrauterine tamponade balloon)  Specimens:   ID Type Source Tests Collected by Time Destination   A : Placenta Products of Conception Placenta/ Fragments post Live Birth SURGICAL PATHOLOGY EXAM Malathi Colón DO 12/16/2020 11:57 AM      Findings:   Retained placenta, suspected velamentous cord insertion, post partum hemorrhage due to atony.  Complications: Unanticipated bleeding from uterus which was controled with uterotonic medications and placement of intrauterine tamponade balloon.  This was nottreated with transfusion of PRBC's.  Implants: * No implants in log *       Malathi Colón DO

## 2020-12-16 NOTE — ANESTHESIA CARE TRANSFER NOTE
Patient: Kyra Torres    Procedure(s):  EXPLORATORY LAPAROTOMY,  placement of b lopez sutures, removal of gloria, repair of marcus uretheral laceration    Diagnosis: Postpartum atony of uterus with hemorrhage [O72.1]  Diagnosis Additional Information: No value filed.    Anesthesia Type:   General     Note:  Airway :Face Mask  Patient transferred to:PACU  Comments: Pt to receive FFP once ready per the massive transfusion protocolHandoff Report: Identifed the Patient, Identified the Reponsible Provider, Reviewed the pertinent medical history, Discussed the surgical course, Reviewed Intra-OP anesthesia mangement and issues during anesthesia, Set expectations for post-procedure period and Allowed opportunity for questions and acknowledgement of understanding      Vitals: (Last set prior to Anesthesia Care Transfer)    CRNA VITALS  12/16/2020 1627 - 12/16/2020 1701      12/16/2020             Pulse:  101    SpO2:  100 %                Electronically Signed By: SANTOS Hernandez CRNA  December 16, 2020  5:01 PM

## 2020-12-16 NOTE — TELEPHONE ENCOUNTER
38 weeks pregnant and due date is Feb 30th Second child.  Kyra states that her water broke.  Denies dizziness and denies vaginal bleeding.  Kyra is having contractions frequent.  Kyra is going to deliver at [place].  FNA transferred caller through to [place] [facility].      COVID 19 Nurse Triage Plan/Patient Instructions    Please be aware that novel coronavirus (COVID-19) may be circulating in the community. If you develop symptoms such as fever, cough, or SOB or if you have concerns about the presence of another infection including coronavirus (COVID-19), please contact your health care provider or visit [email or web address].     Disposition/Instructions    ED Visit recommended. Follow protocol based instructions.     Bring Your Own Device:  Please also bring your smart device(s) (smart phones, tablets, laptops) and their charging cables for your personal use and to communicate with your care team during your visit.    Thank you for taking steps to prevent the spread of this virus.  o Limit your contact with others.  o Wear a simple mask to cover your cough.  o Wash your hands well and often.    Resources    [facility]: About COVID-19: [email or web address]    CDC: What to Do If You're Sick: www.cdc.gov/coronavirus/2019-ncov/about/steps-when-sick.html    CDC: Ending Home Isolation: www.cdc.gov/coronavirus/2019-ncov/hcp/disposition-in-home-patients.html     CDC: Caring for Someone: www.cdc.gov/coronavirus/2019-ncov/if-you-are-sick/care-for-someone.html     [facility]: Interim Guidance for Hospital Discharge to Home: www.health[facility]/diseases/coronavirus/hcp/hospdischarge.pdf    [facility] clinical trials (COVID-19 research studies): [email or web address].[email or web address].edu/[facility]-clinical-trials     Below are the COVID-19 hotlines at the [facility] ([facility]). Interpreters are available.   o For health questions: Call 184-508-8103 or 1-248.719.5035 (7 a.m. to 7 p.m.)  o For questions about schools  "and childcare: Call 948-171-5899 or 1-864.885.7715 (7 a.m. to 7 p.m.)                       Reason for Disposition    Leakage of fluid from vagina    Additional Information    Negative: Passed out (i.e., lost consciousness, collapsed and was not responding)    Negative: Shock suspected (e.g., cold/pale/clammy skin, too weak to stand, low BP, rapid pulse)    Negative: Difficult to awaken or acting confused (e.g., disoriented, slurred speech)    Negative: [1] SEVERE abdominal pain (e.g., excruciating) AND [2] constant AND [3] present > 1 hour    Negative: Severe bleeding (e.g., continuous red blood from vagina, or large blood clots)    Negative: Umbilical cord hanging out of the vagina (shiny, white, curled appearance, \"like telephone cord\")    Negative: Uncontrollable urge to push (i.e., feels like baby is coming out now)    Negative: Can see baby    Negative: Sounds like a life-threatening emergency to the triager    Negative: MODERATE-SEVERE abdominal pain    Negative: Contractions < 10 minutes apart for 1 hour (i.e., 6 or more contractions an hour)    Negative: [1] Contractions > 10 minutes apart AND [2] persist > 24 hours AND [3] no improvement using CARE ADVICE    Negative: [1] Contractions AND [2] any vaginal bleeding (including: red blood, clots, spotting, or pink/brown mucous)    Negative: Vaginal bleeding  (Exception: brief spotting after intercourse or pelvic exam, or slight pinkish or brownish mucous discharge)    Protocols used: PREGNANCY - LABOR - [id number]      "

## 2020-12-16 NOTE — PLAN OF CARE
[place]  used intermittently when reviewing plan of care.   -Speaks Mandarin  -Desires IV pain meds for labor pain control  -Prefers hot water to drink while in labor, no other dietary restrictions  -Planning to breast and formula feed  -Consented to vitamin K, hepatitis b vaccine, eye ointment for infant  -Baby name- Angeli   -Parents are ; no need for ROP. FOB-[facility] for  follow-up, no specific provider picked out

## 2020-12-16 NOTE — BRIEF OP NOTE
[facility]     Brief Operative Note    Pre-operative diagnosis: Postpartum atony of uterus with hemorrhage [O72.1]  Post-operative diagnosis S/p exploratory laparotomy with placement of B Chavez sutures    Procedure: Procedure(s):  EXPLORATORY LAPAROTOMY,  placement of b chavez sutures, removal of gloria, repair of marcus uretheral laceration  Surgeon: Surgeon(s) and Role:     * Malathi Colón DO - Primary     * Isaac Arita MD - Assisting  Anesthesia: General   Estimated blood loss: 300 ml  Drains:  Flaherty to gravity  Specimens: * No specimens in log *  Findings:   Atonic uterus.  Complications: None.  Implants: * No implants in log *      Malathi Colón DO

## 2020-12-16 NOTE — OP NOTE
[facility] Operative Note    Patient Name: Kyra Torres  MRN:[id number]  : 1990  Date of Surgery: 20   Pre-operative diagnosis:  Postpartum atony of uterus with hemorrhage [O72.1]   Post-operative diagnosis:  S/p exploratory laparotomy, post partum hemorrhage due to atony   Procedure:  Procedure(s):  EXPLORATORY LAPAROTOMY,  placement of B lopez sutures, removal of Bakri balloon, repair of marcus uretheral laceration   Surgeon:  Dr. Malathi Colón (OB/GYN Attending Staff)    Assistant(s):  Dr. Isaac Arita  This assistance of this surgeon was required due to the need for additional skilled surgical hands to retract and hold instruments due to the nature of the surgical procedure and risk of complications.    Anesthesia:  General   Estimated blood loss:  300cc   Total IV fluids:  1500ml crystaloid    Blood transfusion:  1U PRBC transfusion intraoperatively (additional 1U PRBC prior to OR and 2U FFP in PACU, 1U platelet transfusion pending)   Total urine output:  Approximately 100 mL    Drains:  Flaherty to gravity   Specimens:  None   Indication:  Patient is a 30 year old  who is PPD#0 s/p  and subsequent POD#0 s/p manual removal of the placenta under anesthesia with treatment of post partum hemorrhage due to uterine atony and placement of bakri balloon. Patient continued to bleed heavily around Bakri balloon despite increased balloon volume and continued uterotonic agents. Decision made to proceed to the OR for exploratory laparotomy and treatment of uterine atony and possible hysterectomy.  Again, patient verbalized desire to retain uterus if possible.     Complications:  None apparent   Condition:  Guarded, transferred to PACU        Findings: External Genitalia: Midline periurethral laceration  Exploratory Laparotomy: Atonic, boggy uterus. Normal appearing bilateral fallopian tubes and ovaries.   Procedure:  Prior to procedure verbal and written consent obtained with the  assistance of an . The patient was taken to the operating room and placed in the dorsal lithotomy position with legs supported in stirups. General anesthesia was administered and found to be adequate.  She was then prepped and draped in the standard sterile fashion.  A time out was performed to confirm correct patient and position. Ancef 2g was administered for perioperative antibiotics.     A pfannenstiel skin incision was made with the first scalpel and carried through to the underlying layer of fascia with electrocuatery.  The fascia was then incised in the midline and the incision extended laterally with Delgado scissors.  The superior aspect of the fascial incision was grasped with kocher clamps and elevated, the underlying rectus muscle was dissected off.  In a similar fashion the inferior aspect of the fascial incision was grasped with Kocher clamps, elevated from the rectus muscle and the underlying rectus muscle dissected off.  The rectus muscles were then  in the midline, the peritoneum identified, and entered bluntly.  This incision was then extended laterally with gentle traction.  The ivan retractor was then placed for visualization.  The uterus was found to be extremely boggy despite Bakri balloon. The intrauterine balloon was deflated and removed. The uterus was exteriorized. #0 Chromic suture used to place a B Chavez compression suture in the uterus. Additional compression suture placed at the fundus of the uterus. Hemabate 250mcg injected in the myometrium. Improved uterine tone noted. Attention turned to the perineum to evaluate amount of vaginal bleeding. Small amount of blood clot removed from the vagina but active bleeding greatly decreased, only small trickle. Midline periurethral laceration identified and repaired in the standard fashion with #3-0 Vicryl to assure excellent hemostasis. After close observation, vaginal bleeding remained stable and scant. Attention returned to  the abdomen for surgical closure. The peritoneum was closed using a #2-0 Vicryl in a running fashion. The fascial incision was then re-approximated using a #0 Vicryl in a running fashion.  The subcutaneous tissues were then closed using a #3-0 plain gut, with the skin being closed using a #4-0 undyed Vicryl. Pressure dressing placed.     The patient tolerated the procedure well.  All sponge, lap and needle counts were correct times two. At the conclusion of the procedure a debrief was performed.  The patient was taken to recovery in guarded condition. Patient received 1U PRBC pre-op, 1U PRBC intra-op, 2U FFP in PACU and has 1U platelets pending. She will have close hemodynamic monitoring for possible need for further blood products.          Dr. Arita actively assisted during this surgery to provide nessessary consultation and was helpful in allowing the operative to proceed in a safe and expeditious manner. He was present for the entire duration of the surgery.      Malathi Colón DO

## 2020-12-16 NOTE — ANESTHESIA PREPROCEDURE EVALUATION
"Anesthesia Pre-Procedure Evaluation    Patient: Kyra Torres   MRN: [id number] : 1990          Preoperative Diagnosis: Retained placenta, unspecified portion of placenta [O73.0]    Procedure(s):  DILATION AND CURETTAGE, UTERUS, repair of perinemeum laceration    Past Medical History:   Diagnosis Date     NO ACTIVE PROBLEMS      Past Surgical History:   Procedure Laterality Date     D & C      Termination 2019-Delivery and then D&C       Anesthesia Evaluation     .             ROS/MED HX    ENT/Pulmonary:  - neg pulmonary ROS     Neurologic:  - neg neurologic ROS     Cardiovascular:  - neg cardiovascular ROS       METS/Exercise Tolerance:     Hematologic:  - neg hematologic  ROS       Musculoskeletal:  - neg musculoskeletal ROS       GI/Hepatic:  - neg GI/hepatic ROS       Renal/Genitourinary:  - ROS Renal section negative       Endo:  - neg endo ROS       Psychiatric:         Infectious Disease:  - neg infectious disease ROS       Malignancy:      - no malignancy   Other:    - neg other ROS                      Physical Exam  Normal systems: cardiovascular, pulmonary and dental    Airway   Mallampati: I  TM distance: >3 FB  Neck ROM: full    Dental     Cardiovascular       Pulmonary             Lab Results   Component Value Date    WBC 11.8 (H) 2020    HGB 10.0 (L) 2020    HCT 30.3 (L) 2020     2020    PTT 29 2020    INR 1.20 (H) 2020    FIBR 324 2020       Preop Vitals  BP Readings from Last 3 Encounters:   20 (!) 89/43   20 112/70   20 115/74    Pulse Readings from Last 3 Encounters:   20 85   20 106   20 116      Resp Readings from Last 3 Encounters:   20 18   20 16   20 14    SpO2 Readings from Last 3 Encounters:   20 (!) 85%   07/10/19 96%   19 98%      Temp Readings from Last 1 Encounters:   20 37.1  C (98.8  F)    Ht Readings from Last 1 Encounters:   20 1.575 m (5' 2\")      Wt " "Readings from Last 1 Encounters:   20 77.6 kg (171 lb)    Estimated body mass index is 31.28 kg/m  as calculated from the following:    Height as of 20: 1.575 m (5' 2\").    Weight as of 20: 77.6 kg (171 lb).       Anesthesia Plan      History & Physical Review  History and physical reviewed and following examination; no interval change.    ASA Status:  1 emergent.        Plan for General with Intravenous and Propofol induction. Maintenance will be TIVA.    PONV prophylaxis:  Ondansetron (or other 5HT-3) and Dexamethasone or Solumedrol  Pt using Mandarin     Emergency case for imminent danger - hemorrhage        Postoperative Care  Postoperative pain management:  IV analgesics, Oral pain medications and Multi-modal analgesia.      Consents  Anesthetic plan, risks, benefits and alternatives discussed with:  Patient and Other (See Comment)..                 SANTOS Hernandez CRNAAnesthesia Pre-Procedure Evaluation    Patient: Kyra Torres   MRN: [id number] : 1990          Preoperative Diagnosis: Postpartum atony of uterus with hemorrhage [O72.1]    Procedure(s):  EXPLORATORY LAPAROTOMY, TREATMENT OF UTERINE ATONY, POSSIBLE TOTAL ABDOMINAL HYSTERECTOMY    Past Medical History:   Diagnosis Date     NO ACTIVE PROBLEMS      Past Surgical History:   Procedure Laterality Date     D & C      Termination 2019-Delivery and then D&C       Anesthesia Evaluation     . Pt has had prior anesthetic. Type: MAC           ROS/MED HX    ENT/Pulmonary:  - neg pulmonary ROS     Neurologic:  - neg neurologic ROS     Cardiovascular:  - neg cardiovascular ROS       METS/Exercise Tolerance:     Hematologic: Comments: Fibrinogen 324    (+) Anemia, Other Hematologic Disorder-Post partum hemorrhage      Musculoskeletal:  - neg musculoskeletal ROS       GI/Hepatic:  - neg GI/hepatic ROS       Renal/Genitourinary:  - ROS Renal section negative       Endo:  - neg endo ROS       Psychiatric:  - neg psychiatric ROS " "      Infectious Disease:  - neg infectious disease ROS       Malignancy:      - no malignancy   Other:    - neg other ROS                      Physical Exam  Normal systems: cardiovascular, pulmonary and dental    Airway   Mallampati: I  TM distance: >3 FB  Neck ROM: full    Dental     Cardiovascular       Pulmonary             Lab Results   Component Value Date    WBC 11.8 (H) 12/16/2020    HGB 10.0 (L) 12/16/2020    HCT 30.3 (L) 12/16/2020     12/16/2020    PTT 29 12/16/2020    INR 1.20 (H) 12/16/2020    FIBR 324 12/16/2020       Preop Vitals  BP Readings from Last 3 Encounters:   12/16/20 90/52   12/14/20 112/70   12/07/20 115/74    Pulse Readings from Last 3 Encounters:   12/16/20 112   12/14/20 106   12/07/20 116      Resp Readings from Last 3 Encounters:   12/16/20 18   11/30/20 16   11/16/20 14    SpO2 Readings from Last 3 Encounters:   12/16/20 100%   07/10/19 96%   05/06/19 98%      Temp Readings from Last 1 Encounters:   12/16/20 37.1  C (98.8  F)    Ht Readings from Last 1 Encounters:   11/30/20 1.575 m (5' 2\")      Wt Readings from Last 1 Encounters:   12/14/20 77.6 kg (171 lb)    Estimated body mass index is 31.28 kg/m  as calculated from the following:    Height as of 11/30/20: 1.575 m (5' 2\").    Weight as of 12/14/20: 77.6 kg (171 lb).       Anesthesia Plan      History & Physical Review  History and physical reviewed and following examination; no interval change.    ASA Status:  3 emergent.    NPO Status:  > 6 hours    Plan for General with Intravenous and Propofol induction. Maintenance will be Balanced.    PONV prophylaxis:  Ondansetron (or other 5HT-3) and Dexamethasone or Solumedrol  Additional equipment: Videolaryngoscope Patient speaks Mandarin    Imminent danger to patient's life - hemorrhage        Postoperative Care  Postoperative pain management:  IV analgesics, Oral pain medications and Multi-modal analgesia.      Consents  Anesthetic plan, risks, benefits and alternatives " discussed with:  Patient and Implied consent/emergency..                 SANTOS Manning CRNA

## 2020-12-16 NOTE — PROGRESS NOTES
First unit of PRBC's is up and running.  No symptoms. VSS.  Afebrile.  Pt is sleeping.  Fundal check found her uterus boggy and heavy bleeding on peripad.   since 1430.  Dr Colón called to the pt's room.  She gave a verbal order for 2 more units of blood and a FFP.  Asked if MTP was needed.  She said yes.  Called MTP to lab.  Hemabate IM repeated.  Verbal/signed consent with help of  for surgery.  To the OR # via bed.  Reported off to Heath Ling RN

## 2020-12-16 NOTE — PLAN OF CARE
S:Delivery  B:Spontaneous Labor,38w5d    Lab Results   Component Value Date    GBS Negative 2020    with antibiotic treatment not indicated 4 hours prior to delivery.  A: Patient delivered  at 1100 with Dr. JAMILAH Colón in attendance and baby placed on mother's abdomen for delayed cord clamping. Baby dried and stimulated. Baby placed  skin to skin @ 1100.. Apgars 8/8. at the bedside.  IV infusion of Oxytocin  infused. Unable to remove placenta at the bedside. To OR for D&C. MD does want placenta sent to pathology. Lazarus balloon placed in OR.   R: Closely manage and monitor bleeding.

## 2020-12-16 NOTE — H&P
[facility] OB/GYN Department    History and Physical Note    Kyra Torres  1990  [id number]    HPI: Kyra Torres is a 30 year old  at 38w5d by LMP c/w 15 week US, who presents with complaint of leakage of fluid at 05:00 this morning. + FM, increasing ctx, no VB.  No other complaints.    Pregnancy notable for:  History of termination at 26 weeks for microcephaly  Resolved marginal cord insertion    OBHX:   OB History    Para Term  AB Living   4 2 1 1 1 1   SAB TAB Ectopic Multiple Live Births   1 0 0 0 1      # Outcome Date GA Lbr Juan/2nd Weight Sex Delivery Anes PTL Lv   4 Current            3  19 26w0d             Birth Comments: Microcephaly-elective termination at 26 weeks with D&C in [place]   2 SAB 18           1 Term 17 38w5d 02:27 / :57 3.01 kg (6 lb 10.2 oz) F Vag-Spont IV REGIONAL, Local N LEIGHTON      Complications: Other Excessive Bleeding, Prolonged PROM (>18 hours)      Name: BETZAIDA TORRES      Apgar1: 8  Apgar5: 9      Obstetric Comments   19: Elective termination at 26 weeks - done in [place] - microcephaly       MedicalHX:   Past Medical History:   Diagnosis Date     NO ACTIVE PROBLEMS        SurgicalHX:   Past Surgical History:   Procedure Laterality Date     D & C      Termination 2019-Delivery and then D&C       Medications:   No current facility-administered medications on file prior to encounter.        FOLIC ACID PO, Take 1 mg by mouth daily       Prenatal Multivit-Min-Fe-FA (PRE-DWIGHT FORMULA PO),         Allergies:  No Known Allergies    FamilyHX:  Family History   Problem Relation Age of Onset     Hypertension Mother        SocialHX:   Social History     Socioeconomic History     Marital status:      Spouse name: Not on file     Number of children: 1     Years of education: Not on file     Highest education level: Not on file   Occupational History     Not on file   Social Needs     Financial resource strain: Not on file      Food insecurity     Worry: Not on file     Inability: Not on file     Transportation needs     Medical: Not on file     Non-medical: Not on file   Tobacco Use     Smoking status: Never Smoker     Smokeless tobacco: Never Used   Substance and Sexual Activity     Alcohol use: No     Drug use: No     Sexual activity: Yes     Partners: Male   Lifestyle     Physical activity     Days per week: Not on file     Minutes per session: Not on file     Stress: Not on file   Relationships     Social connections     Talks on phone: Not on file     Gets together: Not on file     Attends Rastafarian service: Not on file     Active member of club or organization: Not on file     Attends meetings of clubs or organizations: Not on file     Relationship status: Not on file     Intimate partner violence     Fear of current or ex partner: Not on file     Emotionally abused: Not on file     Physically abused: Not on file     Forced sexual activity: Not on file   Other Topics Concern     Parent/sibling w/ CABG, MI or angioplasty before 65F 55M? Not Asked   Social History Narrative     Not on file       ROS: 10-point ROS negative except as in HPI     Physical Exam:  Vitals:    12/16/20 0720   BP: 117/64   Pulse: 83   Resp: 18   Temp: 98.3  F (36.8  C)   TempSrc: Oral     GEN: resting comfortably in bed, NAD   CV: RRR, no murmurs  PULM: No increased work of breathing, no cough/wheeze   ABD: soft, gravid, non-tender, non-distended  EXT: no edema, non-tender to palpation  SVE: 5/70/-1 per nursing  Presentation: Cephalic by SVE  Membranes: SROM, grossly ruptured     NST:  FHT: baseline 135 bpm, moderate variability, + accels, no decels  TOCO: q4min    Labs:        Lab Results   Component Value Date    ABO PENDING 12/16/2020    RH Pos 07/06/2020    AS Neg 07/06/2020    HEPBANG Nonreactive 07/06/2020    CHPCRT Negative 07/06/2020    GCPCRT Negative 07/06/2020    TREPAB Negative 07/03/2017    HGB 12.8 09/28/2020       GBS Status:   Lab Results    Component Value Date    GBS Negative 2020       Lab Results   Component Value Date    PAP NIL 2017       A/P: Kyra Torres is a 30 year old female  at 38w5d, here for labor with SROM.    Admit to L&D. Place PIV. Admission labs. COVID test.  Labor: Expectant management   FHT: Category 1 FHT.  Continue EFM and toco  Pain: Analgesia PRN, would like to try IV pain medications at this time  GBS:   negative    History obtained with assistance from Mandarin . All questions answered.    Malathi Colón DO

## 2020-12-16 NOTE — PLAN OF CARE
S: Admit to Inpatient   A: A:moderate variablility, + accels, no decels, Category I  normal uterine activity  5/80/-1 mid position  Membrane status; SROM  No visits with results within 1 Day(s) from this visit.   Latest known visit with results is:   Prenatal Office Visit on 12/07/2020   Component Date Value    Specimen Description 12/07/2020 Vagina     Wet Prep 12/07/2020 No Trichomonas seen     Wet Prep 12/07/2020 No clue cells seen     Wet Prep 12/07/2020 No yeast seen     Wet Prep 12/07/2020                      Value:WBC'S seen  Few       Dr. JAMILAH Colón informed of above and admission orders received.   R: Plan of care reviewed with patient. Oriented to room. Reviewed resource binder, [facility] book and paperwork to complete.

## 2020-12-16 NOTE — ANESTHESIA PREPROCEDURE EVALUATION
"Anesthesia Pre-Procedure Evaluation    Patient: Kyra Torres   MRN: [id number] : 1990          Preoperative Diagnosis: Retained placenta, unspecified portion of placenta [O73.0]    Procedure(s):  DILATION AND CURETTAGE, UTERUS, repair of perinemeum laceration    Past Medical History:   Diagnosis Date     NO ACTIVE PROBLEMS      Past Surgical History:   Procedure Laterality Date     D & C      Termination 2019-Delivery and then D&C       Anesthesia Evaluation     .             ROS/MED HX    ENT/Pulmonary:  - neg pulmonary ROS     Neurologic:  - neg neurologic ROS     Cardiovascular:  - neg cardiovascular ROS       METS/Exercise Tolerance:     Hematologic:  - neg hematologic  ROS       Musculoskeletal:  - neg musculoskeletal ROS       GI/Hepatic:  - neg GI/hepatic ROS       Renal/Genitourinary:  - ROS Renal section negative       Endo:  - neg endo ROS       Psychiatric:         Infectious Disease:  - neg infectious disease ROS       Malignancy:      - no malignancy   Other:    - neg other ROS                      Physical Exam  Normal systems: cardiovascular, pulmonary and dental    Airway   Mallampati: I  TM distance: >3 FB  Neck ROM: full    Dental     Cardiovascular       Pulmonary             Lab Results   Component Value Date    WBC 9.1 2020    HGB 13.4 2020    HCT 39.5 2020     2020       Preop Vitals  BP Readings from Last 3 Encounters:   20 117/71   20 112/70   20 115/74    Pulse Readings from Last 3 Encounters:   20 86   20 106   20 116      Resp Readings from Last 3 Encounters:   20 20   20 16   20 14    SpO2 Readings from Last 3 Encounters:   07/10/19 96%   19 98%   19 96%      Temp Readings from Last 1 Encounters:   20 36.6  C (97.8  F) (Oral)    Ht Readings from Last 1 Encounters:   20 1.575 m (5' 2\")      Wt Readings from Last 1 Encounters:   20 77.6 kg (171 lb)    Estimated body mass " "index is 31.28 kg/m  as calculated from the following:    Height as of 11/30/20: 1.575 m (5' 2\").    Weight as of 12/14/20: 77.6 kg (171 lb).       Anesthesia Plan      History & Physical Review  History and physical reviewed and following examination; no interval change.    ASA Status:  1 emergent.        Plan for General with Intravenous and Propofol induction. Maintenance will be TIVA.    PONV prophylaxis:  Ondansetron (or other 5HT-3) and Dexamethasone or Solumedrol  Pt using Mandarin     Emergency case for imminent danger - hemorrhage        Postoperative Care  Postoperative pain management:  IV analgesics, Oral pain medications and Multi-modal analgesia.      Consents  Anesthetic plan, risks, benefits and alternatives discussed with:  Patient and Other (See Comment)..                 SANTOS Manning CRNA  "

## 2020-12-16 NOTE — ANESTHESIA POSTPROCEDURE EVALUATION
Patient: Kyra Torres    Procedure(s):  Exam under anesthesia, Manual removal of placenta, uterine curettage, placement of intrauterine tamponade balloon, repair of perineal laceraction.    Diagnosis:Retained placenta, unspecified portion of placenta [O73.0]  Diagnosis Additional Information: No value filed.    Anesthesia Type:  General    Note:  Anesthesia Post Evaluation    Patient location during evaluation: OB PACU  Patient participation: Able to fully participate in evaluation  Level of consciousness: awake and alert  Pain management: adequate  Airway patency: patent  Cardiovascular status: acceptable and hemodynamically stable  Respiratory status: acceptable  Hydration status: acceptable  PONV: none     Anesthetic complications: None          Last vitals:  Vitals:    12/16/20 1247 12/16/20 1300 12/16/20 1315   BP: 103/58 105/52 95/49   Pulse: 98 81 103   Resp:      Temp:      SpO2: 100% 100% 100%         Electronically Signed By: SANTOS Manning CRNA  December 16, 2020  1:31 PM

## 2020-12-16 NOTE — OP NOTE
[facility] Operative Note    Patient Name: Kyra Torres  MRN:[id number]  : 1990  Date of Surgery: 20   Pre-operative diagnosis:  Retained placenta, unspecified portion of placenta [O73.0]   Post-operative diagnosis:  Post partum hemorrhage, retained placenta   Procedure:  Procedure(s):  Exam under anesthesia, Manual removal of placenta, uterine curettage, placement of intrauterine tamponade balloon, repair of perineal laceraction.   Surgeon:  Dr. Malathi Colón (OB/GYN Attending Staff)    Assistant(s):  Dr. Karishma Curry   This assistance of this surgeon was required due to the need for intraoperative consultation due to the nature of the surgical procedure and risk of complications.    Anesthesia:  Monitor Anesthesia Care   Estimated blood loss:  900cc   Total IV fluids:  1000ml crystaloid    Blood transfusion:  No transfusion was given during surgery    Total urine output:  100 mL    Drains:  Flaherty to gravity  Bakri intrauterine tamponade balloon to gravity   Specimens:  ID Type Source Tests Collected by Time Destination   A : Placenta Products of Conception Placenta/ Fragments post Live Birth SURGICAL PATHOLOGY EXAM Malathi Colón DO 2020 11:57 AM       Indication:  Patient is a 30 year old  who presented in labor with SROM. Patient progressed in labor spontaneously and delivered a viable . Placenta was not delivering spontaneously. Upon exam, suspected velamentous cord insertion. Patient too uncomfortably for bedside manual removal of the placenta. Decision made to proceed to the OR for placental removal.     Prior to procedure risks benefits, complications, and alternatives were discussed with the patient.  Verbal and written consent were obtained.   Complications:  None apparent   Condition:  Stable, transferred to PACU        Findings: External Genitalia: Second degree perineal laceration  Bimanual Examination: The uterus was noted to be enlarged and  atonic. Cord attached to membranes without apparent connection to placental disc.      Procedure:  Verbal and written consent obtained with assistance from . Patient was taken from her post partum room to the operating room. The patient was then positioned on the operating table in the dorsal lithotomy position and the legs supported using stirrups. MAC anesthesia was administered. Ancef 2g administered for perioperative antibiotic prophylaxis. Patient was prepped and draped in the usual sterile fashion. Flaherty catheter was placed. A time out was performed to confirm correct patient and procedure. A bimanual exam was performed with the above noted findings.     The placenta was then manually removed from the uterus. The uterus was found to be atonic. Pitocin was already infusing from post delivery. Methergine, Cytotec, Hemabate, and Tranexamic acid were all administered in succession. Dr. Curry was called for the OR for intraoperative consultation and assistance. Gentle uterine curettage was performed with the Aeropost curette. Extensive uterine exploration performed and did not reveal any remaining membranes or placental tissue. The uterus remained atonic despite usage of all uterotonic medications. Decision made to place the Bakri intrauterine tamponade balloon. The balloon was placed through the cervix and inflated in the uterine cavity with 350cc of normal saline. Vaginal bleeding slowed to a trickle. Minimal amount of blood out in Bakri collecting bag. With uterine bleeding under control, attention was turned to the perineum. The second degree perineal laceration was closed in the standard fashion using #3-0 Vicryl. Upon completion of the case, vaginal bleeding remained at a trickle.       At the end of the procedure, all needle, sponge, and instrument counts were noted to be correct ×2. A debrief was performed. The patient tolerated the procedure well and was transferred to her post partum room in  stable condition.     Dr. Curry actively assisted during this surgery to provide nessessary exposure as well as intraoperative consultation and assistance. This was helpful in allowing the operative to proceed in a safe and expeditious manner.       Malathi Colón DO

## 2020-12-17 ENCOUNTER — APPOINTMENT (OUTPATIENT)
Dept: INTERPRETER SERVICES | Facility: CLINIC | Age: 30
End: 2020-12-17
Payer: COMMERCIAL

## 2020-12-17 PROBLEM — D62 ANEMIA DUE TO BLOOD LOSS, ACUTE: Status: ACTIVE | Noted: 2020-12-17

## 2020-12-17 LAB
ALBUMIN SERPL-MCNC: 1.9 G/DL (ref 3.4–5)
ALP SERPL-CCNC: 98 U/L (ref 40–150)
ALT SERPL W P-5'-P-CCNC: 17 U/L (ref 0–50)
ANION GAP SERPL CALCULATED.3IONS-SCNC: 6 MMOL/L (ref 3–14)
APTT PPP: 28 SEC (ref 22–37)
AST SERPL W P-5'-P-CCNC: 21 U/L (ref 0–45)
BILIRUB SERPL-MCNC: 0.3 MG/DL (ref 0.2–1.3)
BUN SERPL-MCNC: 12 MG/DL (ref 7–30)
CALCIUM SERPL-MCNC: 7.7 MG/DL (ref 8.5–10.1)
CHLORIDE SERPL-SCNC: 112 MMOL/L (ref 94–109)
CO2 SERPL-SCNC: 23 MMOL/L (ref 20–32)
CREAT SERPL-MCNC: 0.51 MG/DL (ref 0.52–1.04)
ERYTHROCYTE [DISTWIDTH] IN BLOOD BY AUTOMATED COUNT: 14.8 % (ref 10–15)
FIBRINOGEN PPP-MCNC: 361 MG/DL (ref 200–420)
GFR SERPL CREATININE-BSD FRML MDRD: >90 ML/MIN/{1.73_M2}
GLUCOSE SERPL-MCNC: 107 MG/DL (ref 70–99)
HCT VFR BLD AUTO: 26.6 % (ref 35–47)
HCT VFR BLD AUTO: 27.8 % (ref 35–47)
HGB BLD-MCNC: 9.2 G/DL (ref 11.7–15.7)
HGB BLD-MCNC: 9.5 G/DL (ref 11.7–15.7)
INR PPP: 1.19 (ref 0.86–1.14)
MCH RBC QN AUTO: 29.1 PG (ref 26.5–33)
MCHC RBC AUTO-ENTMCNC: 34.2 G/DL (ref 31.5–36.5)
MCV RBC AUTO: 85 FL (ref 78–100)
PLATELET # BLD AUTO: 168 10E9/L (ref 150–450)
POTASSIUM SERPL-SCNC: 4 MMOL/L (ref 3.4–5.3)
PROT SERPL-MCNC: 4.9 G/DL (ref 6.8–8.8)
RBC # BLD AUTO: 3.26 10E12/L (ref 3.8–5.2)
SODIUM SERPL-SCNC: 141 MMOL/L (ref 133–144)
WBC # BLD AUTO: 14.3 10E9/L (ref 4–11)

## 2020-12-17 PROCEDURE — 85018 HEMOGLOBIN: CPT | Performed by: OBSTETRICS & GYNECOLOGY

## 2020-12-17 PROCEDURE — 722N000001 HC LABOR CARE VAGINAL DELIVERY SINGLE

## 2020-12-17 PROCEDURE — 120N000001 HC R&B MED SURG/OB

## 2020-12-17 PROCEDURE — 250N000013 HC RX MED GY IP 250 OP 250 PS 637: Performed by: OBSTETRICS & GYNECOLOGY

## 2020-12-17 PROCEDURE — 250N000011 HC RX IP 250 OP 636: Performed by: OBSTETRICS & GYNECOLOGY

## 2020-12-17 PROCEDURE — 85014 HEMATOCRIT: CPT | Performed by: OBSTETRICS & GYNECOLOGY

## 2020-12-17 PROCEDURE — 85384 FIBRINOGEN ACTIVITY: CPT | Performed by: OBSTETRICS & GYNECOLOGY

## 2020-12-17 PROCEDURE — 36415 COLL VENOUS BLD VENIPUNCTURE: CPT | Performed by: OBSTETRICS & GYNECOLOGY

## 2020-12-17 PROCEDURE — 93005 ELECTROCARDIOGRAM TRACING: CPT

## 2020-12-17 PROCEDURE — 85730 THROMBOPLASTIN TIME PARTIAL: CPT | Performed by: OBSTETRICS & GYNECOLOGY

## 2020-12-17 PROCEDURE — 85610 PROTHROMBIN TIME: CPT | Performed by: OBSTETRICS & GYNECOLOGY

## 2020-12-17 PROCEDURE — 250N000009 HC RX 250: Performed by: OBSTETRICS & GYNECOLOGY

## 2020-12-17 PROCEDURE — 80053 COMPREHEN METABOLIC PANEL: CPT | Performed by: OBSTETRICS & GYNECOLOGY

## 2020-12-17 PROCEDURE — 85027 COMPLETE CBC AUTOMATED: CPT | Performed by: OBSTETRICS & GYNECOLOGY

## 2020-12-17 RX ORDER — OXYCODONE HYDROCHLORIDE 5 MG/1
5-10 TABLET ORAL
Status: DISCONTINUED | OUTPATIENT
Start: 2020-12-17 | End: 2020-12-19 | Stop reason: HOSPADM

## 2020-12-17 RX ORDER — SIMETHICONE 80 MG
160 TABLET,CHEWABLE ORAL EVERY 6 HOURS PRN
Status: DISCONTINUED | OUTPATIENT
Start: 2020-12-17 | End: 2020-12-19 | Stop reason: HOSPADM

## 2020-12-17 RX ADMIN — Medication 100 ML/HR: at 01:02

## 2020-12-17 RX ADMIN — ACETAMINOPHEN 650 MG: 325 TABLET, FILM COATED ORAL at 07:01

## 2020-12-17 RX ADMIN — ACETAMINOPHEN 650 MG: 325 TABLET, FILM COATED ORAL at 18:16

## 2020-12-17 RX ADMIN — OXYCODONE HYDROCHLORIDE 10 MG: 5 TABLET ORAL at 03:20

## 2020-12-17 RX ADMIN — ACETAMINOPHEN 650 MG: 325 TABLET, FILM COATED ORAL at 12:22

## 2020-12-17 RX ADMIN — ACETAMINOPHEN 650 MG: 325 TABLET, FILM COATED ORAL at 00:17

## 2020-12-17 RX ADMIN — IBUPROFEN 600 MG: 600 TABLET ORAL at 18:16

## 2020-12-17 RX ADMIN — OXYCODONE HYDROCHLORIDE 10 MG: 5 TABLET ORAL at 06:10

## 2020-12-17 RX ADMIN — OXYCODONE HYDROCHLORIDE 10 MG: 5 TABLET ORAL at 09:50

## 2020-12-17 RX ADMIN — IBUPROFEN 600 MG: 600 TABLET ORAL at 12:21

## 2020-12-17 RX ADMIN — METHYLERGONOVINE 200 MCG: 0.2 TABLET ORAL at 08:40

## 2020-12-17 RX ADMIN — IBUPROFEN 600 MG: 600 TABLET ORAL at 06:11

## 2020-12-17 RX ADMIN — FENTANYL CITRATE 50 MCG: 50 INJECTION INTRAMUSCULAR; INTRAVENOUS at 00:25

## 2020-12-17 RX ADMIN — CEFAZOLIN SODIUM 2 G: 2 INJECTION, SOLUTION INTRAVENOUS at 06:38

## 2020-12-17 RX ADMIN — OXYCODONE HYDROCHLORIDE 5 MG: 5 TABLET ORAL at 22:00

## 2020-12-17 NOTE — PROGRESS NOTES
"[facility] OB/GYN Department    Labor Note    Date of Admission: 12/16/2020  Name: Kyra Torres    Subjective:    Patient resting comfortably.     Objective:    Vital signs:  Temp: 99.7  F (37.6  C) Temp src: Oral BP: 102/58 Pulse: 110   Resp: 18 SpO2: 96 % O2 Device: None (Room air) Oxygen Delivery: 4 LPM      Estimated body mass index is 31.28 kg/m  as calculated from the following:    Height as of 11/30/20: 1.575 m (5' 2\").    Weight as of 12/14/20: 77.6 kg (171 lb).    Lungs: CTA b/l, diminished at bases but no crackles or rales  Heart: Tachycardia, regular rhythm  Abd: + bowel sounds, mild distention but soft and compressible, no rigidity or rebound. +appropriately tender to palpation. Pressure dressing in place. Fundus well below umbilicus and scant lochia.    Assessment and Plan:    -Acute blood loss anemia secondary to post partum hemorrhage: has received a total of 4U PRBC, 2U FFP, and 1 pack of platelets. Post transfusion Hb 9.2g/dL. Recheck labs in AM.     -Tachycardia: EKG with sinus tachycardia, likely reactive secondary to blood loss. Continue to trend hemoglobin    -Expect some abdominal pain after multiple episodes of aggressive fundal massage due to hemorrhage. Patient has not yet ambulated, some distension likely due to physiologic ileus. Will work on increasing ambulation. Optimize pain management.    -Flaherty in place, patient diuresing well. 1+ pitting pedal edema, non pitting edema in hands. Consider Lasix if UOP slows.       Malathi Colón,     "

## 2020-12-17 NOTE — PLAN OF CARE
Patient up in chair this am. Reporting pain when moving, but very little at rest. PRN ibuprofen, tylenol and oxycodone for pain management. Abdomen moderately bloated, MD updated. Fundus firm, U/-3, minimal bleeding. Scheduled methergine administered. IV saline locked. Lungs clear, educated on use of IS. Ice to incision; dressing is c/d/i. Tolerated regular breakfast with no difficulty; scheduled senna administered to prevent constipation. Slightly tachycardic, MD updated, afebrile. VS stable otherwise.

## 2020-12-17 NOTE — PLAN OF CARE
Late Entry due to patient care:    Report received form Sofia GAONA RN at 1910. Patient A&O, VSS. Fundus firm and midline at U/3. Scant amount of vaginal bleeding. BREA incision, dressing C/D/I. Patient denies pain ex w/ fundal checks. Plan to infuse 2 units of PRBCs then recheck hgb at 0300. Pitocin infusing at 100ml/hr. Patient denies dizziness, shortness of breath, nausea, or chest pain. Patient understands to notify RN if any of the latter symptoms occur. Will continue to monitor patient at bedside through 2 hours post procedure. Assessment completed using Mandarin Chinese interpretor via [employer].

## 2020-12-17 NOTE — PROGRESS NOTES
Patient brought from PACU to OB at 1850. Blood pressure stable; scant bleeding. Pain well controlled at this time. Plasma infusion complete. Oxytocin infusing. Report given to Krysta GRAHAM at 1910

## 2020-12-17 NOTE — PLAN OF CARE
HR has been slowly trending up throughout the night. Baseline around 1900 yesterday was 90s to 100, now consistently upper 110s-120. Blood pressure has remained stable. RR 18-20. T98.8-100.1F. FF U/3, scant amount of vaginal bleeding. Patient denies dizziness. UOP adequate. Patient rates pain 2-3 with use of Ibuprofen, Oxycodone, Tylenol, and x2 doses of 50mcg Fentanyl. Incision dressing remains CDI. Dr. Colón paged and notified of above. Plan to draw hgb & hct now instead of 0300. EKG ordered. Will continue to monitor.

## 2020-12-17 NOTE — PROGRESS NOTES
[facility] Obstetrics Post-Partum Progress Note          Assessment and Plan:    Assessment:   Post-partum day #1/postoperative day #1  Normal spontaneous vaginal delivery  Exploratory laparotomy; B-Chavez uterine suturing  4 U PRBC, 2 U FFP, 1 U platelets  L&D complications: Postpartum hemorrage due to intractable atony  Acute blood loss anemia      Condition stable, normal lochia, VSS; pt able to tolerate up to chair, urine output WNL      Plan:   Ambulate, discharge noble cath; repeat CBC/coags in AM  Simethicone for gas distention  Start withdrawal of uterotonic medications           Interval History:   Feels dizzy right after taking pain meds, otherwise feeling better; abdomen distended; no nausea or emesis          Significant Problems:    Principal Problem:     (spontaneous vaginal delivery)  Active Problems:    Postpartum atony of uterus with hemorrhage    Retained placenta with hemorrhage            Review of Systems:    The patient denies any chest pain, shortness of breath, excessive pain, fever, chills, purulent drainage from the wound, nausea or vomiting.          Medications:   All medications related to the patient's surgery have been reviewed          Physical Exam:     All vitals stable  Patient Vitals for the past 8 hrs:   BP Temp Temp src Pulse Resp SpO2   20 0955 -- 98.7  F (37.1  C) -- -- -- --   20 0914 102/54 99  F (37.2  C) Oral 111 18 96 %   20 0633 -- 98  F (36.7  C) Oral -- -- --   20 0300 102/58 -- -- 110 -- 96 %     Uterine fundus is firm, non-tender and at the level of the umbilicus  Pfannensteil incision intake, no active bleeding; no eccymosis or induration  Abdm: soft, sl distended  Calves: NT; 1+ pedal edema          Data:     All laboratory data related to this surgery reviewed  Hemoglobin   Date Value Ref Range Status   2020 9.5 (L) 11.7 - 15.7 g/dL Final   2020 9.2 (L) 11.7 - 15.7 g/dL Final   2020 8.4 (L) 11.7 - 15.7 g/dL Final    12/16/2020 10.0 (L) 11.7 - 15.7 g/dL Final   12/16/2020 13.4 11.7 - 15.7 g/dL Final     INR   Date Value Ref Range Status   12/17/2020 1.19 (H) 0.86 - 1.14 Final   12/16/2020 1.21 (H) 0.86 - 1.14 Final   12/16/2020 1.20 (H) 0.86 - 1.14 Final        No imaging studies have been ordered    Karishma Curry MD

## 2020-12-17 NOTE — ANESTHESIA POSTPROCEDURE EVALUATION
Patient: Kyra Torres    Procedure(s):  EXPLORATORY LAPAROTOMY,  placement of b lopez sutures, removal of gloria, repair of marcus uretheral laceration    Diagnosis:Postpartum atony of uterus with hemorrhage [O72.1]  Diagnosis Additional Information: No value filed.    Anesthesia Type:  General    Note:  Anesthesia Post Evaluation    Patient location during evaluation: PACU  Patient participation: Able to fully participate in evaluation  Level of consciousness: awake and alert  Pain management: adequate  Airway patency: patent  Cardiovascular status: acceptable and hemodynamically stable  Respiratory status: acceptable  Hydration status: acceptable  PONV: none     Anesthetic complications: None          Last vitals:  Vitals:    12/16/20 1730 12/16/20 1745 12/16/20 1800   BP: (!) 90/37 109/69 114/72   Pulse: 85 92 98   Resp: 18 16 14   Temp:   36.5  C (97.7  F)   SpO2: 100% 100% 100%         Electronically Signed By: SANTOS Manning CRNA  December 16, 2020  6:27 PM

## 2020-12-17 NOTE — PROGRESS NOTES
Data: Vital signs within normal limits. Postpartum checks within normal limits - see flow record. Patient  Is tolerating po intake. Patient able to empty bladder independently. . Patient instructed to call for assist when up..   No apparent signs of infection. Incision and Lac 2nd degree healing well. Patient Is performing self cares and is not able to care for infant. Positive attachment behaviors are observed with infant. Support persons are present.  Action:  Pain plan was discussed. Patient would like pain meds to be brought in when they are due. Patient was medicated during the shift for pain and cramping. See MAR.Patient education done about breastfeeding, formula feeding, postpartum cares, pain management/plan,  safety, rest and importance of ambulation. Patient declined breastfeeding support and is hesitant to ambulate, needs encouragement. See flow record.  Response:   Patient reassessed within 1 hour after each medication for pain. Patient stated that pain had improved. Patient stated that she was comfortable. .   Plan: Anticipate discharge on 20.

## 2020-12-17 NOTE — PLAN OF CARE
Dr. Colón called to assess patient at bedside. Patient's abdomen appears distended, tender, and soft.   Plan is to get patient OOB after she rests a little longer. Oxycodone increased from 5mg every 4hrs PRN to 5-10mg every 3hrs PRN. Will continue to monitor.

## 2020-12-17 NOTE — PROGRESS NOTES
Late entry due to patient care:    Called back to patient's room in post partum around 15:00. Having increasing amount of vaginal bleeding around the Bakri balloon. Under buttocks pads weighed for additional 500cc. Patient pale appearing, tachycardic, and hypotensive. Discussed worsening condition with patient and consent obtained for exploratory laparotomy, treatment of uterine atony, and possible hysterectomy. Massive transfusion protocol ordered. Proceed to OR.    Malathi Colón DO

## 2020-12-18 LAB
ANION GAP SERPL CALCULATED.3IONS-SCNC: 5 MMOL/L (ref 3–14)
BUN SERPL-MCNC: 15 MG/DL (ref 7–30)
CALCIUM SERPL-MCNC: 7.5 MG/DL (ref 8.5–10.1)
CHLORIDE SERPL-SCNC: 112 MMOL/L (ref 94–109)
CO2 SERPL-SCNC: 26 MMOL/L (ref 20–32)
COPATH REPORT: NORMAL
CREAT SERPL-MCNC: 0.44 MG/DL (ref 0.52–1.04)
ERYTHROCYTE [DISTWIDTH] IN BLOOD BY AUTOMATED COUNT: 15.5 % (ref 10–15)
GFR SERPL CREATININE-BSD FRML MDRD: >90 ML/MIN/{1.73_M2}
GLUCOSE SERPL-MCNC: 99 MG/DL (ref 70–99)
HCT VFR BLD AUTO: 23.1 % (ref 35–47)
HGB BLD-MCNC: 7.6 G/DL (ref 11.7–15.7)
HGB BLD-MCNC: 8 G/DL (ref 11.7–15.7)
INR PPP: 1.17 (ref 0.86–1.14)
MCH RBC QN AUTO: 28.9 PG (ref 26.5–33)
MCHC RBC AUTO-ENTMCNC: 32.9 G/DL (ref 31.5–36.5)
MCV RBC AUTO: 88 FL (ref 78–100)
PLATELET # BLD AUTO: 152 10E9/L (ref 150–450)
POTASSIUM SERPL-SCNC: 3.5 MMOL/L (ref 3.4–5.3)
RBC # BLD AUTO: 2.63 10E12/L (ref 3.8–5.2)
SODIUM SERPL-SCNC: 143 MMOL/L (ref 133–144)
WBC # BLD AUTO: 11.6 10E9/L (ref 4–11)

## 2020-12-18 PROCEDURE — 36415 COLL VENOUS BLD VENIPUNCTURE: CPT | Performed by: OBSTETRICS & GYNECOLOGY

## 2020-12-18 PROCEDURE — 120N000001 HC R&B MED SURG/OB

## 2020-12-18 PROCEDURE — 85018 HEMOGLOBIN: CPT | Performed by: OBSTETRICS & GYNECOLOGY

## 2020-12-18 PROCEDURE — 85027 COMPLETE CBC AUTOMATED: CPT | Performed by: OBSTETRICS & GYNECOLOGY

## 2020-12-18 PROCEDURE — 250N000011 HC RX IP 250 OP 636: Performed by: OBSTETRICS & GYNECOLOGY

## 2020-12-18 PROCEDURE — 250N000013 HC RX MED GY IP 250 OP 250 PS 637: Performed by: OBSTETRICS & GYNECOLOGY

## 2020-12-18 PROCEDURE — 80048 BASIC METABOLIC PNL TOTAL CA: CPT | Performed by: OBSTETRICS & GYNECOLOGY

## 2020-12-18 PROCEDURE — 85610 PROTHROMBIN TIME: CPT | Performed by: OBSTETRICS & GYNECOLOGY

## 2020-12-18 RX ADMIN — IBUPROFEN 600 MG: 600 TABLET ORAL at 06:40

## 2020-12-18 RX ADMIN — IBUPROFEN 600 MG: 600 TABLET ORAL at 12:34

## 2020-12-18 RX ADMIN — ENOXAPARIN SODIUM 40 MG: 40 INJECTION SUBCUTANEOUS at 17:52

## 2020-12-18 RX ADMIN — IBUPROFEN 600 MG: 600 TABLET ORAL at 00:23

## 2020-12-18 RX ADMIN — ACETAMINOPHEN 650 MG: 325 TABLET, FILM COATED ORAL at 12:34

## 2020-12-18 RX ADMIN — ACETAMINOPHEN 650 MG: 325 TABLET, FILM COATED ORAL at 00:23

## 2020-12-18 RX ADMIN — DOCUSATE SODIUM AND SENNOSIDES 1 TABLET: 8.6; 5 TABLET ORAL at 21:00

## 2020-12-18 RX ADMIN — ACETAMINOPHEN 650 MG: 325 TABLET, FILM COATED ORAL at 18:27

## 2020-12-18 RX ADMIN — ACETAMINOPHEN 650 MG: 325 TABLET, FILM COATED ORAL at 06:40

## 2020-12-18 RX ADMIN — IBUPROFEN 600 MG: 600 TABLET ORAL at 18:27

## 2020-12-18 NOTE — PROGRESS NOTES
Pt up and ambulating to bathroom and in room independently. Encouraged to ambulate in hallway this evening and tolerated well. Up in chair and responding to infant needs appropriately and independently. Pain medication given for incisional and uterine pain. Will continue to monitor and assist with cares as needed.

## 2020-12-18 NOTE — PLAN OF CARE
Data: Vital signs within normal limits. Postpartum checks within normal limits - see flow record. Patient  Is tolerating po intake. Patient able to empty bladder independently. . Patient ambulating independently.   No apparent signs of infection. Incision and Lac 2nd degree healing well. Patient Is performing self cares and Is able to care for infant. Positive attachment behaviors are observed with infant. Support persons are present.  Action:  Pain plan was discussed. Patient would like pain meds to be brought in when they are due. Patient was medicated during the shift for pain. See MAR.Patient education done about  cares, postpartum cares, pain management/plan, and rest. See flow record.  Response:   Patient reassessed within 1 hour after each medication for pain. Patient stated that pain had improved. Patient stated that she was comfortable. .   Plan: Anticipate discharge on 20.

## 2020-12-18 NOTE — PROGRESS NOTES
[facility] OB/GYN Daily Postpartum Note    S:  yKra Torres is a 30 year old  who is PPD#2 s/p  c/b retained placenta, requiring manual removal in OR with subsequent hemorrhage and Bakri placement and eventual return to the OR for XL, B lopez sutures.  used for encounter.     Patient doing well. States that she does not have dizziness, lightheadedness. Lochia has decreased and is currently light. Urinating without difficulty. No nausea, vomiting. Bottle and attempting to breast feed. Questions and concerns addressed.    O:   VS:   Patient Vitals for the past 24 hrs:   BP Temp Temp src Pulse Resp SpO2   20 0740 101/49 98.6  F (37  C) Oral 96 16 --   20 0404 99/47 97.7  F (36.5  C) Oral 101 18 95 %   20 2053 97/41 98  F (36.7  C) Oral 112 18 97 %   20 1653 -- 98.3  F (36.8  C) Oral -- -- --   20 1502 100/48 98.5  F (36.9  C) Oral 102 16 94 %   20 1232 -- -- -- -- 16 --       I/O last 3 completed shifts:  In: 200 [P.O.:200]  Out: 725 [Urine:725]    General: resting in bed, in NAD  CV: reg rate, well perfused  Resp: no increased work of breathing  Abdomen: soft, appropriately tender, nondistended  Fundus firm below the umbilicus  Inc: CDI  Extremities: non-tender, non-edematous     Recent Labs   Lab 20  0515 20  0633 20  0136 20  1933 20  1218   HGB 7.6* 9.5* 9.2* 8.4* 10.0*       A: Kyra Torres is a 30 year old  who is PPD#2 s/p  c/b retained placenta, requiring manual removal in OR with subsequent hemorrhage and Bakri placement and eventual return to the OR for XL, B lopez sutures.  used for encounter.       P:  Continue routine pp cares  PPH s/p uterotonic, Bakri, XL with B lopez sutures: HGB this AM at 7.6, patient currently asymptomatic but mildly tachycardic, plan recheck this afternoon, BMP WNL, INR stable at 1.17 with minimal ongoing lochia, discussed that if HGB remains stable and lochia  light, would tentatively plan on PPx lovenox while in house pending stable HGB  GI: tolerating regular diet  Feeding: bottle and breast  Contraception: discuss prior to DC  Rh positive, Rubella Immune  Disposition: routine PP cares, anticipate d/c tomorrow pending continued stability in HGB    Edith Hammond MD, MD  [facility] OB/GYN   12/18/2020 9:55 AM

## 2020-12-19 VITALS
SYSTOLIC BLOOD PRESSURE: 114 MMHG | OXYGEN SATURATION: 97 % | TEMPERATURE: 97.7 F | HEART RATE: 95 BPM | DIASTOLIC BLOOD PRESSURE: 72 MMHG | RESPIRATION RATE: 16 BRPM

## 2020-12-19 PROBLEM — Z98.890 S/P EXPLORATORY LAPAROTOMY: Status: ACTIVE | Noted: 2020-12-19

## 2020-12-19 PROBLEM — O43.129 VELAMENTOUS INSERTION OF UMBILICAL CORD: Status: ACTIVE | Noted: 2020-12-19

## 2020-12-19 PROCEDURE — 250N000013 HC RX MED GY IP 250 OP 250 PS 637: Performed by: OBSTETRICS & GYNECOLOGY

## 2020-12-19 RX ORDER — FERROUS SULFATE 325(65) MG
325 TABLET ORAL 2 TIMES DAILY
Qty: 60 TABLET | Refills: 3 | Status: SHIPPED | OUTPATIENT
Start: 2020-12-19 | End: 2021-01-25

## 2020-12-19 RX ORDER — OXYCODONE HYDROCHLORIDE 5 MG/1
5 TABLET ORAL
Qty: 18 TABLET | Refills: 0 | Status: SHIPPED | OUTPATIENT
Start: 2020-12-19 | End: 2021-01-25

## 2020-12-19 RX ORDER — DOCUSATE SODIUM 100 MG/1
100 CAPSULE, LIQUID FILLED ORAL 2 TIMES DAILY
Qty: 60 CAPSULE | Refills: 1 | Status: SHIPPED | OUTPATIENT
Start: 2020-12-19 | End: 2021-01-25

## 2020-12-19 RX ORDER — IBUPROFEN 600 MG/1
600 TABLET, FILM COATED ORAL EVERY 6 HOURS PRN
Qty: 30 TABLET | Refills: 0 | Status: SHIPPED | OUTPATIENT
Start: 2020-12-19 | End: 2021-01-25

## 2020-12-19 RX ADMIN — DOCUSATE SODIUM AND SENNOSIDES 1 TABLET: 8.6; 5 TABLET ORAL at 07:55

## 2020-12-19 RX ADMIN — ACETAMINOPHEN 650 MG: 325 TABLET, FILM COATED ORAL at 00:24

## 2020-12-19 RX ADMIN — IBUPROFEN 600 MG: 600 TABLET ORAL at 06:02

## 2020-12-19 RX ADMIN — ACETAMINOPHEN 650 MG: 325 TABLET, FILM COATED ORAL at 06:02

## 2020-12-19 NOTE — PROGRESS NOTES
Pt vss, afebrile.  Small amt vaginal bleeding.  Abd inc CDI.  Taking Ibup & tylenol scheduled for discomfort.  Up oob indep. Ambulated in hallway today x 3.  Enc po fluid intake.  Pt bonding with infant.  Pt stable.

## 2020-12-19 NOTE — PROGRESS NOTES
[facility] OB/GYN Department    Post-Partum Progress Note: PPD #3/POD#3    Name: Kyra Torres  Date: 12/19/2020    Subjective:   Patient seen and examined.  No complaints.  Pain well controlled on PO meds.  Tolerating regular diet, without nausea or vomiting.  Ambulating and voiding without difficulty. Denies any lightheadedness with ambulation. No bowel movement yet but has been passing gas. Lochia moderate and stable.  Bottle feeding. Denies any fevers, chills, or shortness of breath.    ROS:    General/Constitutional:  Denies change in appetite, chills, or fever  Respiratory: Denies shortness of breath, cough, wheezing   Cardiovascular: Denies chest pain, exertional pain, irregular heartbeat  Gastrointestinal:  +mild uterine cramping, no constipation, nausea, or vomiting  Genitourinary: Denies hematuria, difficulty urinating, frequency, or dysuria   Musculoskeletal: Denies aching muscles or joints, no peripheral edema  Neurologic: Denies dizziness, numbness, convulsions, tremors, or confusion      Objective:   No intake or output data in the 24 hours ending 12/19/20 0829    Patient Vitals for the past 24 hrs:   BP Temp Temp src Pulse Resp SpO2   12/19/20 0756 114/72 97.7  F (36.5  C) Oral 95 16 97 %   12/19/20 0000 107/61 98.3  F (36.8  C) Oral 102 16 100 %   12/18/20 2059 -- -- -- -- -- 97 %   12/18/20 2011 98/54 98.2  F (36.8  C) Oral 101 16 --   12/18/20 1500 104/52 98.6  F (37  C) Oral 122 16 --       No results found.    General appearance: well-hydrated, A&O x 3, no apparent distress  ENT: EOMI, sclera anicteric   Lungs: Equal expansion bilaterally, no accessory muscle use  Heart: No heaves or thrills. No peripheral varicosities  Constitutional: See vitals  Abdomen: Soft, non-distended, no rebound or rigidity. Incision clean, dry, intact with steri strips in place.  Uterus: Firm 3 below umbilicus with non-tender fundus   Neurologic: CN II-XII grossly intact, no lateralizing defects, no gross  movement abnormalities  Extremities: non pitting pedal edema, no calf tenderness    Assessment and Plan:    Retained placenta with hemorrhage  Velamentous cord insertion requiring manual removal of placenta in OR     (spontaneous vaginal delivery)  PPD#3 s/p  with subsequent hemorrhage  Perineal care  Encourage ambulation   Lochia has been stable  Anticipate discharge home today    Postpartum atony of uterus with hemorrhage  -Post partum hemorrhage secondary to uterine atony, total EBL 2,300cc  -POD#3 S/p manual removal of placenta secondary to velementous cord insertion, uterine curettage, placement of Bakri balloon. Continued hemorrhage around Bakri. Subsequent exploratory laparotomy with placement of B Chavez and uterine compression sutures.   -Total blood products: 4U pRBC, 2U FFP, 1 pack platelets   -Hb stable at 8.0g/dL  -Anticipate discharge home today, Rx for iron  -Discussed surgical implications with patient. She would be a very high risk pregnancy in the future. Would need to be prepared for risk for post partum hysterectomy at time of delivery. Would not recommend pregnancy for minimum of 1 year, if at all. Offered contraception, patient declined.     Anemia due to blood loss, acute  Anemia secondary to post partum hemorrhage  S/p transfusion of blood products, as above  Home with iron    S/P exploratory laparotomy  POD#3 s/p ex lap and placement of uterine compression suture for uterine atony  Patient doing well post operatively  Anticipate discharge home today with close follow up      Malathi Colón DO

## 2020-12-19 NOTE — ASSESSMENT & PLAN NOTE
POD#3 s/p ex lap and placement of uterine compression suture for uterine atony  Patient doing well post operatively  Anticipate discharge home today with close follow up

## 2020-12-19 NOTE — DISCHARGE SUMMARY
[facility] Discharge Summary    Kyra Torres MRN# [id number]   Age: 30 year old YOB: 1990     Date of Admission:  2020  Date of Discharge::  2020  Admitting Physician:  Malathi Colón DO  Discharge Physician:  Malathi Colón DO     Home clinic: [facility]          Admission Diagnoses:   pregnancy  Labor without complication          Discharge Diagnosis:     Post partum hemorrhage secondary to uterine atony  S/p exploratory laparotomy          Procedures:   Procedure(s):   OR#1: manual removal of placenta under anesthesia, Repair of second degree perineal laceration, placement of Bakri balloon  OR#2: exploratory laparotomy, removal of Bakri balloon, placement of B Chavez and uterine compression sutures       Blood or human products: Transfusion of 4U pRBC, 2U FFP, 1 pack of platelets           Medications Prior to Admission:     Medications Prior to Admission   Medication Sig Dispense Refill Last Dose     Prenatal Multivit-Min-Fe-FA (PRE- FORMULA PO)         [DISCONTINUED] FOLIC ACID PO Take 1 mg by mouth daily                Discharge Medications:     Current Discharge Medication List      START taking these medications    Details   docusate sodium (COLACE) 100 MG capsule Take 1 capsule (100 mg) by mouth 2 times daily  Qty: 60 capsule, Refills: 1    Associated Diagnoses: S/P exploratory laparotomy      ferrous sulfate (FEROSUL) 325 (65 Fe) MG tablet Take 1 tablet (325 mg) by mouth 2 times daily  Qty: 60 tablet, Refills: 3    Associated Diagnoses: Anemia due to blood loss, acute; S/P exploratory laparotomy      ibuprofen (ADVIL/MOTRIN) 600 MG tablet Take 1 tablet (600 mg) by mouth every 6 hours as needed for other (cramping)  Qty: 30 tablet, Refills: 0    Associated Diagnoses: S/P exploratory laparotomy      oxyCODONE (ROXICODONE) 5 MG tablet Take 1 tablet (5 mg) by mouth every 3 hours as needed for moderate to severe pain  Qty: 18 tablet, Refills: 0     Associated Diagnoses: S/P exploratory laparotomy         CONTINUE these medications which have NOT CHANGED    Details   Prenatal Multivit-Min-Fe-FA (PRE- FORMULA PO)          STOP taking these medications       FOLIC ACID PO Comments:   Reason for Stopping:                     Consultations:   No consultations were requested during this admission          Brief History of Labor:   Kyra Torres is a 30 year old  presented at 38 weeks 5 days who was admitted to L&D with labor and SROM. Her pregnancy was complicated by history of prior pregnancy with microcephaly and subsequent termination at 26 weeks. She progressed spontaneously through labor until complete dilation. Patient did not labor down. She pushed effectively and delivered a viable female infant with Apgars 8/8 over a second degree laceration. Delayed cord clamping was performed. Cord was clamped and cut. Placenta was not delivering spontaneously. Upon inspection, suspected velamentous cord insertion identified with complete retention of the placenta. Patient with moderate amount of active bleeding. Due to patient discomfort, unable to manually remove placenta at bedside. Decision made to proceed to the OR for removal. QBL at time of delivery prior to OR was 400cc. In operating room, patient underwent an exam under anesthesia, manual removal of the placenta. Uterine atony with hemorrhage encountered. All uterotonic medications administered and finally an intrauterine tamponade balloon was placed. Perineal laceration was repaired. Total EBL from OR was 900cc. See operative report for full details. Patient was transferred to post partum unit for close observation.  While in postpartum, she experienced recurring active bleeding from around the uterine tamponade, had decompensation in her vitals in spite of 2 Units PRBC; she was then taken to the OR, underwent exploratory laparotomy with B-lopez sutures placed due to persistant intractable uterine atony;  the tamponade balloon was removed and her bleeding markedly improved.  She subsequently received 2 additional units PRBC, 2 U FFP and 1 U platelets.            Hospital Course:   Patient progressed well post operatively. Her bleeding remained stable. She was ambulating without difficulty. On discharge, her pain was well controlled. Vaginal bleeding is similar to peak menstrual flow.  Voiding without difficulty.  Tolerating a normal diet without nausea or vomiting.  No fever. Infant is stable, bottle feeding.  No bowel movement yet.  She was discharged on post-partum/post-operative day #3.    Post-partum hemoglobin:   Hemoglobin   Date Value Ref Range Status   12/18/2020 8.0 (L) 11.7 - 15.7 g/dL Final             Discharge Instructions and Follow-Up:   Discharge diet: Regular   Discharge activity: Activity as tolerated, lifting restrictions of 10 pounds   Discharge follow-up: Follow up with me on 12/28/20 at 3:30pm   Wound care: Steri-strips off in 2-3 days           Discharge Disposition:   Discharged to home      Attestation:  I have reviewed today's vital signs, notes, medications, labs and imaging.    Malathi Colón DO

## 2020-12-19 NOTE — ASSESSMENT & PLAN NOTE
PPD#3 s/p  with subsequent hemorrhage  Perineal care  Encourage ambulation   Lochia has been stable  Anticipate discharge home today

## 2020-12-19 NOTE — ASSESSMENT & PLAN NOTE
-Post partum hemorrhage secondary to uterine atony, total EBL 2,300cc  -POD#3 S/p manual removal of placenta secondary to velementous cord insertion, uterine curettage, placement of Bakri balloon. Continued hemorrhage around Bakri. Subsequent exploratory laparotomy with placement of B Chavez and uterine compression sutures.   -Total blood products: 4U pRBC, 2U FFP, 1 pack platelets   -Hb stable at 8.0g/dL  -Anticipate discharge home today, Rx for iron  -Discussed surgical implications with patient. She would be a very high risk pregnancy in the future. Would need to be prepared for risk for post partum hysterectomy at time of delivery. Would not recommend pregnancy for minimum of 1 year, if at all. Offered contraception, patient declined.

## 2020-12-19 NOTE — ASSESSMENT & PLAN NOTE
Anemia secondary to post partum hemorrhage  S/p transfusion of blood products, as above  Home with iron

## 2020-12-19 NOTE — PLAN OF CARE
Patient discharged per ambulatory with infant in car seat. Mother verified that her band matches her infant's band by comparing the infant's  MR#.  Discharge instructions given. Encouraged to call for any problems, questions or concerns. RXs picked up at pharmacy.

## 2020-12-19 NOTE — PROGRESS NOTES
Data: Vital signs within normal limits. Postpartum checks within normal limits - see flow record. Patient eating and drinking normally. Patient able to empty bladder independently and is up ambulating. No apparent signs of infection. Incision healing well, CDI. Patient performing self cares and is able to care for infant.  Action: Patient medicated during the shift for pain. See MAR. Patient reassessed within 1 hour after each medication and pain was improved - patient stated she was comfortable. Patient education done about pain meds, skin to skin, leg swelling. See flow record.  Response: Positive attachment behaviors observed with infant. Support person/dad is present.   Plan: Anticipate discharge on 12/19.

## 2020-12-28 ENCOUNTER — PRENATAL OFFICE VISIT (OUTPATIENT)
Dept: OBGYN | Facility: CLINIC | Age: 30
End: 2020-12-28
Payer: COMMERCIAL

## 2020-12-28 VITALS
WEIGHT: 155 LBS | RESPIRATION RATE: 16 BRPM | SYSTOLIC BLOOD PRESSURE: 107 MMHG | TEMPERATURE: 98.3 F | BODY MASS INDEX: 28.35 KG/M2 | HEART RATE: 112 BPM | DIASTOLIC BLOOD PRESSURE: 73 MMHG

## 2020-12-28 DIAGNOSIS — Z98.890 S/P EXPLORATORY LAPAROTOMY: ICD-10-CM

## 2020-12-28 PROCEDURE — 99207 PR POST PARTUM EXAM: CPT | Performed by: OBSTETRICS & GYNECOLOGY

## 2020-12-28 NOTE — PROGRESS NOTES
[facility]    Post Partum Office Visit    CC: Post partum visit    HPI: Kyra Torres is a 30 year old  who presents for a post operative visit.  Patient delivered on , by myself via . Placenta did not deliver spontaneously due to velamentous cord insertion. Patient was brought to the OR for manual removal of the placenta and was subsequently found to have uterine atony with hemorrhage, refractory to all uterotonic agents. Bakri balloon placed but bleeding continued. Patient brought back to the OR for exploratory laparotomy and placement of B Chvaez uterine compression sutures with improvement in bleeding.     Patient reports she is doing well today. She is no longer taking any pain medications. Only occasionally having some pain, mainly when laying on her side in bed. Reports normal diet. Has some questions on eating meat and how this would affect her bleeding, reassurance given that this would not increase her bleeding. Normal bowel and bladder function. Having light vaginal bleeding, like the last days of her period. No other vaginal discharge. She is breast feeding.    ROS:  General/Constitutional:  Denies chills or fever  Respiratory: Denies shortness of breath, cough, wheezing   Cardiovascular: Denies chest pain, exertional pain, irregular heartbeat  Gastrointestinal:  Appropriate abdominal pain, no constipation, nausea, or vomiting  Genitourinary: Denies hematuria, difficulty urinating, frequency, or dysuria   Skin: Denies dry skin, itching, rash, new skin lesions  Musculoskeletal: Denies aching muscles or joints, swelling in joints, back pain, shoulder pain, or painful feet, no peripheral edema  Psychiatric: Denies post partum depression    Physical Exam:   Vitals:    20 1535   BP: 107/73   BP Location: Right arm   Cuff Size: Adult Regular   Pulse: 112   Resp: 16   Temp: 98.3  F (36.8  C)   Weight: 70.3 kg (155 lb)      Estimated body mass index is 28.35 kg/m  as  "calculated from the following:    Height as of 11/30/20: 1.575 m (5' 2\").    Weight as of this encounter: 70.3 kg (155 lb).    General appearance: well-hydrated, A&O x 3, no apparent distress  Lungs: Equal expansion bilaterally, no accessory muscle use  Heart: No heaves or thrills. No peripheral varicosities  Abdomen: Soft, appropriately tender to palpation, non-distended. No rebound, rigidity, or guarding. Incision healing well. Steri strips removed. No erythema or induration.   Extremities: no edema, no calf tenderness  Neuro: CN II-XII grossly intact  Genitourinary:  External genitalia: no erythema, perineal and periurethral lacerations healing well  Urethral meatus appropriate location without lesions or prolapse  Urethra: No masses, tenderness, or scarring  Uterus: involuting, no tenderness to palpation.         Assessment and Plan:     Encounter Diagnoses   Name Primary?     Routine postpartum follow-up Yes     S/P exploratory laparotomy      Postpartum atony of uterus with hemorrhage        Appropriate post partum/operative recovery. Discussed proper wound care/hygeine for infection prevention. Discussed diet, patient was concerned that eating meat could contribute to increased bleeding, assurance given that she is OK to eat meat.    Patient has declined any post partum contraception.    Malathi Colón,           "

## 2021-01-18 ENCOUNTER — MEDICAL CORRESPONDENCE (OUTPATIENT)
Dept: HEALTH INFORMATION MANAGEMENT | Facility: CLINIC | Age: 31
End: 2021-01-18

## 2021-01-25 ENCOUNTER — PRENATAL OFFICE VISIT (OUTPATIENT)
Dept: OBGYN | Facility: CLINIC | Age: 31
End: 2021-01-25
Payer: COMMERCIAL

## 2021-01-25 VITALS
RESPIRATION RATE: 18 BRPM | DIASTOLIC BLOOD PRESSURE: 70 MMHG | TEMPERATURE: 97.4 F | BODY MASS INDEX: 27.8 KG/M2 | WEIGHT: 152 LBS | SYSTOLIC BLOOD PRESSURE: 113 MMHG | HEART RATE: 100 BPM

## 2021-01-25 DIAGNOSIS — Z12.4 SCREENING FOR MALIGNANT NEOPLASM OF CERVIX: ICD-10-CM

## 2021-01-25 PROBLEM — D62 ANEMIA DUE TO BLOOD LOSS, ACUTE: Status: RESOLVED | Noted: 2020-12-17 | Resolved: 2021-01-25

## 2021-01-25 PROBLEM — Z98.890 S/P EXPLORATORY LAPAROTOMY: Status: RESOLVED | Noted: 2020-12-19 | Resolved: 2021-01-25

## 2021-01-25 PROBLEM — O43.129 VELAMENTOUS INSERTION OF UMBILICAL CORD: Status: RESOLVED | Noted: 2020-12-19 | Resolved: 2021-01-25

## 2021-01-25 PROBLEM — O35.2XX0: Status: RESOLVED | Noted: 2020-07-06 | Resolved: 2021-01-25

## 2021-01-25 PROBLEM — Z34.81 ENCOUNTER FOR SUPERVISION OF OTHER NORMAL PREGNANCY IN FIRST TRIMESTER: Status: RESOLVED | Noted: 2019-03-11 | Resolved: 2021-01-25

## 2021-01-25 PROCEDURE — G0145 SCR C/V CYTO,THINLAYER,RESCR: HCPCS | Performed by: OBSTETRICS & GYNECOLOGY

## 2021-01-25 PROCEDURE — 99207 PR POST PARTUM EXAM: CPT | Performed by: OBSTETRICS & GYNECOLOGY

## 2021-01-25 PROCEDURE — 87624 HPV HI-RISK TYP POOLED RSLT: CPT | Performed by: OBSTETRICS & GYNECOLOGY

## 2021-01-25 ASSESSMENT — ANXIETY QUESTIONNAIRES
5. BEING SO RESTLESS THAT IT IS HARD TO SIT STILL: NOT AT ALL
3. WORRYING TOO MUCH ABOUT DIFFERENT THINGS: NOT AT ALL
1. FEELING NERVOUS, ANXIOUS, OR ON EDGE: NOT AT ALL
IF YOU CHECKED OFF ANY PROBLEMS ON THIS QUESTIONNAIRE, HOW DIFFICULT HAVE THESE PROBLEMS MADE IT FOR YOU TO DO YOUR WORK, TAKE CARE OF THINGS AT HOME, OR GET ALONG WITH OTHER PEOPLE: NOT DIFFICULT AT ALL
2. NOT BEING ABLE TO STOP OR CONTROL WORRYING: NOT AT ALL
GAD7 TOTAL SCORE: 0
6. BECOMING EASILY ANNOYED OR IRRITABLE: NOT AT ALL
7. FEELING AFRAID AS IF SOMETHING AWFUL MIGHT HAPPEN: NOT AT ALL

## 2021-01-25 ASSESSMENT — PATIENT HEALTH QUESTIONNAIRE - PHQ9
5. POOR APPETITE OR OVEREATING: NOT AT ALL
SUM OF ALL RESPONSES TO PHQ QUESTIONS 1-9: 0

## 2021-01-25 NOTE — NURSING NOTE
"Initial /70 (BP Location: Right arm, Patient Position: Chair, Cuff Size: Adult Regular)   Pulse 100   Temp 97.4  F (36.3  C) (Tympanic)   Resp 18   Wt 68.9 kg (152 lb)   LMP 03/20/2020 (LMP Unknown)   Breastfeeding Yes   BMI 27.80 kg/m   Estimated body mass index is 27.8 kg/m  as calculated from the following:    Height as of 11/30/20: 1.575 m (5' 2\").    Weight as of this encounter: 68.9 kg (152 lb). .      "

## 2021-01-25 NOTE — PROGRESS NOTES
Kyra is a 30 year old female 6 weeks S/P  of a liveborn baby girl.  The delivery was  complicated by intractable postpartum atony, refractory to Hemabate, Misoprostol, Methergine and Bakri balloon, s/p exp lap with B-lopez sutures.  She is not still bleeding.  She is breastfeeding, and has selected condoms for birth control.  Her last PAP smear was , and was Normal; her  PHQ-9 inventory score is: 0    Exam: /70 (BP Location: Right arm, Patient Position: Chair, Cuff Size: Adult Regular)   Pulse 100   Temp 97.4  F (36.3  C) (Tympanic)   Resp 18   Wt 68.9 kg (152 lb)   LMP 2020 (LMP Unknown)   Breastfeeding Yes   BMI 27.80 kg/m    EGBUS wnl, cervix parous, uterus small, non-tender, no adnexal masses, normal lochia and Pap taken  Pfannensteil incision healed fully, nontender    Assessment:  Encounter Diagnoses   Name Primary?     Routine postpartum follow-up Yes     Postpartum atony of uterus with hemorrhage      Screening for malignant neoplasm of cervix          Plan:  I discussed via Chinese , at length the potential for recurrence with next pregnancy, the potential for cavity synechiae due to d & C/sutures, the advice to wait minimum 1 year  Prior to conception, and then to have either an HSG or HYSTEROSCOPY to evaluate the integrity of the cavity.  Karishma Curry MD  [facility]

## 2021-01-26 ASSESSMENT — ANXIETY QUESTIONNAIRES: GAD7 TOTAL SCORE: 0

## 2021-01-29 LAB
COPATH REPORT: NORMAL
FINAL DIAGNOSIS: NORMAL
HPV HR 12 DNA CVX QL NAA+PROBE: NEGATIVE
HPV16 DNA SPEC QL NAA+PROBE: NEGATIVE
HPV18 DNA SPEC QL NAA+PROBE: NEGATIVE
PAP: NORMAL
SPECIMEN DESCRIPTION: NORMAL
SPECIMEN SOURCE CVX/VAG CYTO: NORMAL

## 2021-02-16 ENCOUNTER — MEDICAL CORRESPONDENCE (OUTPATIENT)
Dept: HEALTH INFORMATION MANAGEMENT | Facility: CLINIC | Age: 31
End: 2021-02-16

## 2021-04-02 ENCOUNTER — TELEPHONE (OUTPATIENT)
Dept: OBGYN | Facility: CLINIC | Age: 31
End: 2021-04-02

## 2021-04-02 NOTE — TELEPHONE ENCOUNTER
I agree with RN's note.  We do not know the effects as there has not been studies done on pregnancy or breast feeding mothers.  It is patient's decision.      Dee Fry, DO

## 2021-04-02 NOTE — TELEPHONE ENCOUNTER
RN returned call to pt using  services #[id number] and relayed the .covidvaccine phrase to pt.    RN relayed the effects are not well studied during breastfeeding but we support her decision regarding the vaccine either way.    Pt wanting providers advisement about the COVID vaccine and if it will effect her baby.    RN routing to provider for advisement.    Jennyfer Krishna RN on 4/2/2021 at 11:50 AM

## 2021-04-02 NOTE — TELEPHONE ENCOUNTER
RN called and spoke with patient using  services #[id number], relaying provider notes. Patient verbalized understanding and had no further questions.     Jennyfer Krishna RN on 4/2/2021 at 12:24 PM

## 2021-04-19 ENCOUNTER — MEDICAL CORRESPONDENCE (OUTPATIENT)
Dept: HEALTH INFORMATION MANAGEMENT | Facility: CLINIC | Age: 31
End: 2021-04-19

## 2022-03-07 ENCOUNTER — PRENATAL OFFICE VISIT (OUTPATIENT)
Dept: OBGYN | Facility: CLINIC | Age: 32
End: 2022-03-07

## 2022-03-07 DIAGNOSIS — Z34.80 PRENATAL CARE, SUBSEQUENT PREGNANCY: ICD-10-CM

## 2022-03-07 PROCEDURE — 99207 PR NO CHARGE NURSE ONLY: CPT | Performed by: OBSTETRICS & GYNECOLOGY

## 2022-03-08 ENCOUNTER — APPOINTMENT (OUTPATIENT)
Dept: OBGYN | Facility: CLINIC | Age: 32
End: 2022-03-08
Payer: MEDICAID

## 2022-03-08 NOTE — PROGRESS NOTES
Denied need for review of teaching  [facility] OB Intake Nurse    Patient supplied answers from flow sheet for:  Prenatal OB Questionnaire.  Past Medical History  Have you ever recieved care for your mental health? : No  Have you ever been in a major accident or suffered serious trauma?: No  Within the last year, has anyone hit, slapped, kicked or otherwise hurt you?: No  In the last year, has anyone forced you to have sex when you didn't want to?: No    Past Medical History 2   Have you ever received a blood transfusion?: (!) Yes (after her last delivery)  Would you accept a blood transfusion if was medically recommended?: Yes  Does anyone in your home smoke?: No   Is your blood type Rh negative?: Unknown  Have you ever ?: (!) Yes  Have you been hospitalized for a nonsurgical reason excluding normal delivery?: No  Have you ever had an abnormal pap smear?: No    Past Medical History (Continued)  Do you have a history of abnormalities of the uterus?: No  Did your mother take LOS or any other hormones when she was pregnant with you?: Unknown  Do you have any other problems we have not asked about which you feel may be important to this pregnancy?: No

## 2022-03-16 ENCOUNTER — TELEPHONE (OUTPATIENT)
Dept: OBGYN | Facility: CLINIC | Age: 32
End: 2022-03-16
Payer: MEDICAID

## 2022-03-16 NOTE — TELEPHONE ENCOUNTER
Reason for Call:  Other call back    Detailed comments: Mandarin interpretor calling with pt.  Pt is newly pregnant - concerned about baby.  1st OB appt not until April 5th.  Nausea, vomiting, having some bleeding that started 3-13, next day ok.  Last night a little bleeding then it was ok, now this morning bleeding - and when she wiped it was brown in color.  LMP 1-22-22.  Offered pt a sooner appt 3-24-22 at [place] or 3-18 at [place].  Pt very concerned. She does not want to wait - may check to see if she can get into another clinic today or tomorrow.  Would like to speak to nurse [facility] though.     Phone Number Patient can be reached at: Home number on file [phone] (home)    Best Time:     Can we leave a detailed message on this number? YES    Call taken on 3/16/2022 at 10:20 AM by Rivka Egan

## 2022-03-16 NOTE — TELEPHONE ENCOUNTER
S-(situation):  2 drops of blood on tp when wiping.    B-(background): LMP 1/22/22 7W 4d    A-(assessment):  Denies clots, cramping, lightheadedness.  Has only noticed 1 time when wiping.     R-(recommendations):  Review signs and symptoms of miscarriage and when to be seen in ED with help of . Reviewed multiple times and patient verbalized understanding and acknowledged when to be seen,    Patient was offered to earlier [facility] appointments but elected to keep 4/5/22.    Carmen Khan RN on 3/16/2022 at 10:45 AM

## 2022-04-05 ENCOUNTER — PRENATAL OFFICE VISIT (OUTPATIENT)
Dept: OBGYN | Facility: CLINIC | Age: 32
End: 2022-04-05
Payer: MEDICAID

## 2022-04-05 VITALS
HEART RATE: 86 BPM | BODY MASS INDEX: 26.09 KG/M2 | HEIGHT: 62 IN | RESPIRATION RATE: 16 BRPM | SYSTOLIC BLOOD PRESSURE: 114 MMHG | TEMPERATURE: 97.2 F | DIASTOLIC BLOOD PRESSURE: 72 MMHG | WEIGHT: 141.8 LBS

## 2022-04-05 DIAGNOSIS — Z34.81 PRENATAL CARE, SUBSEQUENT PREGNANCY IN FIRST TRIMESTER: Primary | ICD-10-CM

## 2022-04-05 LAB
ABO/RH(D): NORMAL
ALBUMIN UR-MCNC: NEGATIVE MG/DL
ANTIBODY SCREEN: NEGATIVE
APPEARANCE UR: CLEAR
BACTERIA #/AREA URNS HPF: ABNORMAL /HPF
BILIRUB UR QL STRIP: NEGATIVE
COLOR UR AUTO: YELLOW
ERYTHROCYTE [DISTWIDTH] IN BLOOD BY AUTOMATED COUNT: 12.9 % (ref 10–15)
GLUCOSE UR STRIP-MCNC: NEGATIVE MG/DL
HCT VFR BLD AUTO: 38.8 % (ref 35–47)
HGB BLD-MCNC: 12.9 G/DL (ref 11.7–15.7)
HGB UR QL STRIP: NEGATIVE
KETONES UR STRIP-MCNC: NEGATIVE MG/DL
LEUKOCYTE ESTERASE UR QL STRIP: NEGATIVE
MCH RBC QN AUTO: 29.1 PG (ref 26.5–33)
MCHC RBC AUTO-ENTMCNC: 33.2 G/DL (ref 31.5–36.5)
MCV RBC AUTO: 87 FL (ref 78–100)
MUCOUS THREADS #/AREA URNS LPF: PRESENT /LPF
NITRATE UR QL: NEGATIVE
PH UR STRIP: 6.5 [PH] (ref 5–7)
PLATELET # BLD AUTO: 262 10E3/UL (ref 150–450)
RBC # BLD AUTO: 4.44 10E6/UL (ref 3.8–5.2)
RBC #/AREA URNS AUTO: ABNORMAL /HPF
SP GR UR STRIP: 1.02 (ref 1–1.03)
SPECIMEN EXPIRATION DATE: NORMAL
SQUAMOUS #/AREA URNS AUTO: ABNORMAL /LPF
UROBILINOGEN UR STRIP-ACNC: 0.2 E.U./DL
WBC # BLD AUTO: 6 10E3/UL (ref 4–11)
WBC #/AREA URNS AUTO: ABNORMAL /HPF

## 2022-04-05 PROCEDURE — 86901 BLOOD TYPING SEROLOGIC RH(D): CPT | Performed by: OBSTETRICS & GYNECOLOGY

## 2022-04-05 PROCEDURE — 99213 OFFICE O/P EST LOW 20 MIN: CPT | Performed by: OBSTETRICS & GYNECOLOGY

## 2022-04-05 PROCEDURE — 87086 URINE CULTURE/COLONY COUNT: CPT | Performed by: OBSTETRICS & GYNECOLOGY

## 2022-04-05 PROCEDURE — 86780 TREPONEMA PALLIDUM: CPT | Performed by: OBSTETRICS & GYNECOLOGY

## 2022-04-05 PROCEDURE — 87591 N.GONORRHOEAE DNA AMP PROB: CPT | Performed by: OBSTETRICS & GYNECOLOGY

## 2022-04-05 PROCEDURE — 87340 HEPATITIS B SURFACE AG IA: CPT | Performed by: OBSTETRICS & GYNECOLOGY

## 2022-04-05 PROCEDURE — 86762 RUBELLA ANTIBODY: CPT | Performed by: OBSTETRICS & GYNECOLOGY

## 2022-04-05 PROCEDURE — 85027 COMPLETE CBC AUTOMATED: CPT | Performed by: OBSTETRICS & GYNECOLOGY

## 2022-04-05 PROCEDURE — 87491 CHLMYD TRACH DNA AMP PROBE: CPT | Performed by: OBSTETRICS & GYNECOLOGY

## 2022-04-05 PROCEDURE — 76817 TRANSVAGINAL US OBSTETRIC: CPT | Performed by: OBSTETRICS & GYNECOLOGY

## 2022-04-05 PROCEDURE — 36415 COLL VENOUS BLD VENIPUNCTURE: CPT | Performed by: OBSTETRICS & GYNECOLOGY

## 2022-04-05 PROCEDURE — 81001 URINALYSIS AUTO W/SCOPE: CPT | Performed by: OBSTETRICS & GYNECOLOGY

## 2022-04-05 PROCEDURE — 87389 HIV-1 AG W/HIV-1&-2 AB AG IA: CPT | Performed by: OBSTETRICS & GYNECOLOGY

## 2022-04-05 PROCEDURE — 86900 BLOOD TYPING SEROLOGIC ABO: CPT | Performed by: OBSTETRICS & GYNECOLOGY

## 2022-04-05 PROCEDURE — 86850 RBC ANTIBODY SCREEN: CPT | Performed by: OBSTETRICS & GYNECOLOGY

## 2022-04-05 PROCEDURE — 86803 HEPATITIS C AB TEST: CPT | Performed by: OBSTETRICS & GYNECOLOGY

## 2022-04-05 NOTE — PROGRESS NOTES
Source: obtained via patient/, and chart.      Kyra is a 31 year old  @ 10.3 weeks here for new ob visit.      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      Her last pregnancy was complicated by postpartum hemorrhage that required manual removal of placenta, D&C, Bakri, and ultimately an xlap with B-lopez suture.      ROS: Ten point review of systems was reviewed and negative except the above.  Current Issues include: Sab symptoms:  spotting    Past Medical History:   Diagnosis Date     NO ACTIVE PROBLEMS      Past Surgical History:   Procedure Laterality Date     D & C      Termination 2019-Delivery and then D&C     DILATION AND CURETTAGE N/A 2020    Procedure: Exam under anesthesia, Manual removal of placenta, uterine curettage, placement of intrauterine tamponade balloon, repair of perineal laceraction.;  Surgeon: Malathi Colón DO;  Location: [facility] OR     LAPAROTOMY EXPLORATORY N/A 2020    Procedure: EXPLORATORY LAPAROTOMY,  placement of b lopez sutures, removal of gloria, repair of marcus uretheral laceration;  Surgeon: Malathi Colón DO;  Location: [facility] OR     Patient Active Problem List    Diagnosis Date Noted     Prenatal care, subsequent pregnancy 2022     Priority: Medium     Postpartum atony of uterus with hemorrhage 2020     Priority: Medium     With  #2: refractory to Hemabate, Misoprostol, Methergine and Bakri balloon; s/p exp lap with B-lopez sutures    Rec:  HSG or HYSTEROSCOPY when desires next conception to assess uterine cavity       Language barrier 2017     Priority: Medium     Speaks primarily Chinese.  Needs         No Known Allergies  Prenatal Multivit-Min-Fe-FA (PRE- FORMULA PO),     No current facility-administered medications on file prior to visit.    FH: Her family history was reviewed and documented in its appropriate chart area.    Past Medical History of Father of Baby:   No  "significant medical history    Physical Exam: /72 (BP Location: Left arm, Patient Position: Chair, Cuff Size: Adult Regular)   Pulse 86   Temp 97.2  F (36.2  C) (Tympanic)   Resp 16   Ht 1.575 m (5' 2\")   Wt 64.3 kg (141 lb 12.8 oz)   LMP 2022   Breastfeeding No   BMI 25.94 kg/m    General: Well developed, well nourished female  Skin: Normal  HEENT: Normal   Abdomen: Benign and Soft, flat, non-tender   Extremities: Normal  Neurological: Normal   Perineum: Normal   Vulva: Normal     Transvaginal ultrasound was performed.  A viable intrauterine pregnancy was seen.  CRL consistent with 10 weeks, 4 days.  Fetal heart motion was visualized.    EDC by LMP: 10/29/22   EDC by sono:  10/28/22  Final EDC: 10/29/22    A/P 31 year old  at  10.3  weeks    1. Discussed physician coverage, tertiary support, diet, exercise, weight gain, schedule of visits, routine and indicated ultrasounds, and childbirth education.    2. Options for  testing for chromosomal and birth defects were discussed with the patient including nuchal lucency/blood marker testing in the first trimester and quad screening and/or Level 2 ultrasound in the second trimester.  We discussed that these are screening tests and not diagnostic tests and that false positives and negatives are a distinct possibility.  We discussed that follow up diagnostic testing would include chorionic villus sampling or amniocentesis depending on gestational age.    Also discussed the option of NIPT, which is less invasive, more accurate, but not always covered by insurance.  Patient would like to check with insurance    3. Prenatal labs, pap, GC, Chlamydia    4. Prenatal Vitamins     5. Prior abnormal pregnancy: plan Level 2    6. Prior PPH: discussed that the B-lopez suture was absorbable so should not interfere with her current pregnancy.  However, it would make us more prepared this time around for uterotonics and possible need for surgical " intervention.   Guadalupe Petersen M.D.

## 2022-04-05 NOTE — NURSING NOTE
"Initial /72 (BP Location: Left arm, Patient Position: Chair, Cuff Size: Adult Regular)   Pulse 86   Temp 97.2  F (36.2  C) (Tympanic)   Resp 16   Ht 1.575 m (5' 2\")   Wt 64.3 kg (141 lb 12.8 oz)   LMP 01/22/2022   Breastfeeding No   BMI 25.94 kg/m   Estimated body mass index is 25.94 kg/m  as calculated from the following:    Height as of this encounter: 1.575 m (5' 2\").    Weight as of this encounter: 64.3 kg (141 lb 12.8 oz). .    Ifrah Zuniga, FABIOLA    "

## 2022-04-05 NOTE — PATIENT INSTRUCTIONS
????????????    ??  50% ? 90% ??????????????????????????????????????????????????    ???????????????????????????    ?????    ??---- ?? ????????????????????????????????????????????????????????? (80%) ??????????????????    ??????? 5 ? 6 ??????????????????????? 16 ? 18 ????????15% ? 20% ??????????????5% ?????????[1]?    ????????????????????????????????????????????? [2]?    ???? -- ???????????????????????????????????????????????????????????? 5% ???????????????????????????????????????????????????????????????????????    ????????????  ?????????????????????????????????????????????????????????????????????????    ????  ????????????????????????    ??????????????  ??????????????????????????????  ?????  ???????????????????????????  ??????????????  ???????????????  ?????????????????    ??????  ???????????????????/??????????????????????????????????????????????????????????????????? (???? ??????????? )    ???????????????????????????    ????????????????????  ??????????????????????  ???????????  ?????12???????????  ???????5??2.3???  ????????????????  ????/????????????????????????    ??????????????????????????????????????????????????????    ??????????  ?????????????????????????????????????????????????????????????????????    ??????????????????????????????????????????????????????????????????????????????????????????????? (?? ?????????????? )    ????----?????????????????????????????????????????????????????????    ????????????????????????  ?????????????????????????????????????  ????????  ???????????????????????????????????????  ?????????????????????????????????    ???? -- ?????????????????????????????????????????????????????????????????????    ???????????    ??????  ???????????????????????  ??????  ???  ?????????????????????  ?????  ?????  ?????    ??????? --  ????????????????????    ??????  ????????  ???????????????????  ?????????????????????????????????????????????????????????????????????????????????????????????????????????????????????????????????????????????? 14 ??????? 400 ? 800 ????????????????????    ???? -- ????????????????????    ??????----??????1??????????????????????????????????????????????????????????? [3] ????????????????????????????????????????????????  ???----??????????????????????????????????????????????????? [4]????????????????????????????  ??????----???????????????????????????????    ?????? -- ????/????????????????? CBD ???????????????????????????????????? [5]??????????????????????????????????????    ????? -- ?????????????????????? (IV) ??????????????????????????????????????????????????????????????????????????????????????????? 24 ? 48 ????    ?????????????????????????????????????????????????????????????????????.    ?? -- ?????????????????????????????????????????????????????????????????????????????????  ???? B6 ?????----?????? B6 ???????????????????????????????????????????????? B6 ???????? B6 ???????????????????????????? Diclectin ???? Diclegis???????????????????????????????????? Unisom?GoodSense Sleep Aid?????????? (Aldex AN)?  ?????----???????????????????????????????????????????????  ?????????????????   ????Phenergan?----???????????????????????????????????????????????????????????????????????    ????? (Reglan) - ????????????????????????????????????????????? 30 ???????????????????????????   ???? (Zofran) - ???????????????? 8 ? 12 ?????????????? QT ?????????? QT ????????????????????????????????????????????????????????????????????????????   ?????Compazine?----?????????????????????????????????????????????????????????????? QT ?????????????????    ??????Bev-Luisa??????----??????????????????????????????????????

## 2022-04-06 LAB
C TRACH DNA SPEC QL PROBE+SIG AMP: NEGATIVE
HBV SURFACE AG SERPL QL IA: NONREACTIVE
HCV AB SERPL QL IA: NONREACTIVE
HIV 1+2 AB+HIV1 P24 AG SERPL QL IA: NONREACTIVE
N GONORRHOEA DNA SPEC QL NAA+PROBE: NEGATIVE
RUBV IGG SERPL QL IA: 2.87 INDEX
RUBV IGG SERPL QL IA: POSITIVE
T PALLIDUM AB SER QL: NONREACTIVE

## 2022-04-07 LAB — BACTERIA UR CULT: NORMAL

## 2022-04-21 ENCOUNTER — TELEPHONE (OUTPATIENT)
Dept: OBGYN | Facility: CLINIC | Age: 32
End: 2022-04-21
Payer: MEDICAID

## 2022-04-21 NOTE — TELEPHONE ENCOUNTER
Normal 1st OB results   Patient reports understanding through interpretor.    Renee Day   Ob[facility]Gyn Clinic  RN

## 2022-04-21 NOTE — TELEPHONE ENCOUNTER
Reason for Call:  Other call back    Detailed comments: Pt and Mandarin interpretor on the phone.  Pt calling for blood draw results she had on April 5th.    Phone Number Patient can be reached at: Home number on file [phone] (home)    Best Time:     Can we leave a detailed message on this number?     Call taken on 4/21/2022 at 1:31 PM by Rivka Egan

## 2022-05-03 ENCOUNTER — PRENATAL OFFICE VISIT (OUTPATIENT)
Dept: OBGYN | Facility: CLINIC | Age: 32
End: 2022-05-03
Payer: COMMERCIAL

## 2022-05-03 ENCOUNTER — TRANSCRIBE ORDERS (OUTPATIENT)
Dept: MATERNAL FETAL MEDICINE | Facility: HOSPITAL | Age: 32
End: 2022-05-03

## 2022-05-03 VITALS
HEART RATE: 92 BPM | HEIGHT: 62 IN | TEMPERATURE: 97.8 F | DIASTOLIC BLOOD PRESSURE: 62 MMHG | WEIGHT: 142.5 LBS | BODY MASS INDEX: 26.22 KG/M2 | SYSTOLIC BLOOD PRESSURE: 105 MMHG | RESPIRATION RATE: 14 BRPM

## 2022-05-03 DIAGNOSIS — O26.90 PREGNANCY RELATED CONDITION, ANTEPARTUM: Primary | ICD-10-CM

## 2022-05-03 DIAGNOSIS — Z34.80 PRENATAL CARE OF MULTIGRAVIDA, ANTEPARTUM: Primary | ICD-10-CM

## 2022-05-03 DIAGNOSIS — Z23 NEED FOR PROPHYLACTIC VACCINATION AND INOCULATION AGAINST INFLUENZA: ICD-10-CM

## 2022-05-03 DIAGNOSIS — O35.2XX0 PREVIOUS CHILD WITH CONGENITAL ANOMALY, CURRENTLY PREGNANT, ANTEPARTUM, NOT APPLICABLE OR UNSPECIFIED FETUS: ICD-10-CM

## 2022-05-03 PROCEDURE — 90471 IMMUNIZATION ADMIN: CPT | Performed by: ADVANCED PRACTICE MIDWIFE

## 2022-05-03 PROCEDURE — 99207 PR PRENATAL VISIT: CPT | Performed by: ADVANCED PRACTICE MIDWIFE

## 2022-05-03 PROCEDURE — 36415 COLL VENOUS BLD VENIPUNCTURE: CPT | Performed by: ADVANCED PRACTICE MIDWIFE

## 2022-05-03 PROCEDURE — 90686 IIV4 VACC NO PRSV 0.5 ML IM: CPT | Performed by: ADVANCED PRACTICE MIDWIFE

## 2022-05-03 NOTE — NURSING NOTE
"Initial /62 (BP Location: Right arm, Patient Position: Sitting, Cuff Size: Adult Regular)   Pulse 92   Temp 97.8  F (36.6  C) (Tympanic)   Resp 14   Ht 1.575 m (5' 2\")   Wt 64.6 kg (142 lb 8 oz)   LMP 01/22/2022   BMI 26.06 kg/m   Estimated body mass index is 26.06 kg/m  as calculated from the following:    Height as of this encounter: 1.575 m (5' 2\").    Weight as of this encounter: 64.6 kg (142 lb 8 oz). .      "

## 2022-05-03 NOTE — PROGRESS NOTES
Feeling well.   Denies any leaking of fluid, vaginal bleeding, regular uterine contractions, or headaches or other concerns.  She would like the NIPT test.    Due to history of fetal anomaly, M US explained and ordered.  Reviewed to call for contractions, loss of fluid, vaginal bleedingt or any other questions or concerns.    RTC in 4 weeks.  Felisa Fan, RAMESH, APRN, CNM    NIPT today with gender, call patient with results.    Tahmina Black MA

## 2022-05-09 LAB — SCANNED LAB RESULT: NORMAL

## 2022-05-26 ENCOUNTER — PRE VISIT (OUTPATIENT)
Dept: MATERNAL FETAL MEDICINE | Facility: HOSPITAL | Age: 32
End: 2022-05-26
Payer: COMMERCIAL

## 2022-05-31 ENCOUNTER — ANCILLARY PROCEDURE (OUTPATIENT)
Dept: ULTRASOUND IMAGING | Facility: HOSPITAL | Age: 32
End: 2022-05-31
Attending: ADVANCED PRACTICE MIDWIFE
Payer: COMMERCIAL

## 2022-05-31 ENCOUNTER — PRENATAL OFFICE VISIT (OUTPATIENT)
Dept: OBGYN | Facility: CLINIC | Age: 32
End: 2022-05-31
Payer: COMMERCIAL

## 2022-05-31 ENCOUNTER — OFFICE VISIT (OUTPATIENT)
Dept: MATERNAL FETAL MEDICINE | Facility: HOSPITAL | Age: 32
End: 2022-05-31
Attending: ADVANCED PRACTICE MIDWIFE
Payer: COMMERCIAL

## 2022-05-31 VITALS
TEMPERATURE: 97.4 F | SYSTOLIC BLOOD PRESSURE: 96 MMHG | DIASTOLIC BLOOD PRESSURE: 60 MMHG | HEART RATE: 101 BPM | BODY MASS INDEX: 26.7 KG/M2 | WEIGHT: 146 LBS

## 2022-05-31 DIAGNOSIS — O35.2XX0 PREVIOUS CHILD WITH CONGENITAL ANOMALY, CURRENTLY PREGNANT, ANTEPARTUM, NOT APPLICABLE OR UNSPECIFIED FETUS: ICD-10-CM

## 2022-05-31 DIAGNOSIS — Z34.80 PRENATAL CARE OF MULTIGRAVIDA, ANTEPARTUM: Primary | ICD-10-CM

## 2022-05-31 DIAGNOSIS — O26.90 PREGNANCY RELATED CONDITION, ANTEPARTUM: ICD-10-CM

## 2022-05-31 DIAGNOSIS — O35.9XX0 SUSPECTED FETAL ANOMALY, ANTEPARTUM, SINGLE OR UNSPECIFIED FETUS: Primary | ICD-10-CM

## 2022-05-31 PROCEDURE — 99207 PR NO CHARGE LOS: CPT | Performed by: OBSTETRICS & GYNECOLOGY

## 2022-05-31 PROCEDURE — 99207 PR PRENATAL VISIT: CPT | Performed by: ADVANCED PRACTICE MIDWIFE

## 2022-05-31 PROCEDURE — 76811 OB US DETAILED SNGL FETUS: CPT

## 2022-05-31 PROCEDURE — 76811 OB US DETAILED SNGL FETUS: CPT | Mod: 26 | Performed by: OBSTETRICS & GYNECOLOGY

## 2022-05-31 NOTE — PROGRESS NOTES
Feeling well.  Baby is active. Denies any leaking of fluid, vaginal bleeding, regular uterine contractions, or headaches or other concerns.  Feeling a little bit of movement.    Reviewed NIPT report together.  She has had 2 COVID vaccines and is worried about catching COVID during pregnancy.  I recommended a booster and we reviewed masking, staying away from sick people, handwashing.    She has M US scheduled.  Reviewed to call for contractions, loss of fluid, vaginal bleeding, decreased fetal movement or any other questions or concerns.    RTC in 4 weeks.  Felisa Fan, DNP, APRN, CNM

## 2022-05-31 NOTE — NURSING NOTE
"Initial BP 96/60 (BP Location: Right arm, Patient Position: Chair, Cuff Size: Adult Small)   Pulse 101   Temp 97.4  F (36.3  C) (Tympanic)   Wt 66.2 kg (146 lb)   LMP 01/22/2022   Breastfeeding No   BMI 26.70 kg/m   Estimated body mass index is 26.7 kg/m  as calculated from the following:    Height as of 5/3/22: 1.575 m (5' 2\").    Weight as of this encounter: 66.2 kg (146 lb). .    Tahmina Black MA    "

## 2022-06-28 ENCOUNTER — PRENATAL OFFICE VISIT (OUTPATIENT)
Dept: OBGYN | Facility: CLINIC | Age: 32
End: 2022-06-28
Payer: COMMERCIAL

## 2022-06-28 VITALS
BODY MASS INDEX: 26.87 KG/M2 | HEART RATE: 97 BPM | WEIGHT: 146 LBS | DIASTOLIC BLOOD PRESSURE: 62 MMHG | SYSTOLIC BLOOD PRESSURE: 100 MMHG | TEMPERATURE: 98.3 F | HEIGHT: 62 IN | RESPIRATION RATE: 16 BRPM

## 2022-06-28 DIAGNOSIS — Z34.80 PRENATAL CARE OF MULTIGRAVIDA, ANTEPARTUM: Primary | ICD-10-CM

## 2022-06-28 PROCEDURE — 99207 PR PRENATAL VISIT: CPT | Performed by: ADVANCED PRACTICE MIDWIFE

## 2022-06-28 NOTE — PROGRESS NOTES
Seen with .  Feeling well.  Baby is active. Denies any leaking of fluid, vaginal bleeding, regular uterine contractions, or headaches or other concerns.  Had a normal MFM anatomy US.  We reviewed the report together. They did not get optimal views of the lips and 4 chamber hear and recommended a follow up.  Kyra declines a follow up US at this time.  We discussed that she can change her mind and have a follow up if she wants in the future.    We discussed GCT and labs at next visit.   Reviewed to call for contractions, loss of fluid, vaginal bleeding, decreased fetal movement or any other questions or concerns.    RTC in 4 weeks.  Felisa Fan, RAMESH, APRN, CNM

## 2022-06-28 NOTE — NURSING NOTE
"Initial /62 (BP Location: Right arm, Patient Position: Chair, Cuff Size: Adult Regular)   Pulse 97   Temp 98.3  F (36.8  C) (Tympanic)   Resp 16   Ht 1.575 m (5' 2\")   Wt 66.2 kg (146 lb)   LMP 01/22/2022   BMI 26.70 kg/m   Estimated body mass index is 26.7 kg/m  as calculated from the following:    Height as of this encounter: 1.575 m (5' 2\").    Weight as of this encounter: 66.2 kg (146 lb). .      "

## 2022-07-21 ENCOUNTER — NURSE TRIAGE (OUTPATIENT)
Dept: NURSING | Facility: CLINIC | Age: 32
End: 2022-07-21

## 2022-07-21 ENCOUNTER — TELEPHONE (OUTPATIENT)
Dept: OBGYN | Facility: CLINIC | Age: 32
End: 2022-07-21

## 2022-07-21 NOTE — TELEPHONE ENCOUNTER
Letter typed and given to Dr. Petersen to sign - will contact pt when ready.    -Rivka HAMPTON [facility]  Clinic Station

## 2022-07-21 NOTE — LETTER
[facility]   [address] [place] [address]  [phone]    2022      RE:Kyra Torres                                                                                                                                [address] [place] [id number]            To Whom It May Concern:      Kyra Torres ( 1990) is a patient here at [facility]. She is currently pregnant with an estimated delivery date of 10-.      Sincerely,      Guadalupe Petersen MD

## 2022-07-21 NOTE — TELEPHONE ENCOUNTER
Patient states she would like a note saying she is pregnant with her due date on it.  Please call when ready for .    Thank you.

## 2022-07-26 NOTE — TELEPHONE ENCOUNTER
Called and spoke to patient to let her know that the letter she requested in ready for . Pt is aware letter will be at the .        Marlee Figueroa   Clinic Station    [facility]  [phone]

## 2022-08-01 PROBLEM — Z87.59 HISTORY OF POSTPARTUM HEMORRHAGE: Status: ACTIVE | Noted: 2022-08-01

## 2022-08-02 ENCOUNTER — PRENATAL OFFICE VISIT (OUTPATIENT)
Dept: OBGYN | Facility: CLINIC | Age: 32
End: 2022-08-02
Payer: COMMERCIAL

## 2022-08-02 VITALS
HEIGHT: 62 IN | DIASTOLIC BLOOD PRESSURE: 64 MMHG | TEMPERATURE: 97.4 F | BODY MASS INDEX: 27.34 KG/M2 | HEART RATE: 93 BPM | SYSTOLIC BLOOD PRESSURE: 95 MMHG | WEIGHT: 148.6 LBS | RESPIRATION RATE: 12 BRPM

## 2022-08-02 DIAGNOSIS — Z34.82 PRENATAL CARE, SUBSEQUENT PREGNANCY IN SECOND TRIMESTER: Primary | ICD-10-CM

## 2022-08-02 DIAGNOSIS — R73.09 ABNORMAL GLUCOSE TOLERANCE TEST: Primary | ICD-10-CM

## 2022-08-02 DIAGNOSIS — Z87.59 HISTORY OF POSTPARTUM HEMORRHAGE: ICD-10-CM

## 2022-08-02 LAB
ERYTHROCYTE [DISTWIDTH] IN BLOOD BY AUTOMATED COUNT: 12.9 % (ref 10–15)
GLUCOSE 1H P 50 G GLC PO SERPL-MCNC: 163 MG/DL (ref 70–129)
HCT VFR BLD AUTO: 38.1 % (ref 35–47)
HGB BLD-MCNC: 12.6 G/DL (ref 11.7–15.7)
MCH RBC QN AUTO: 30.4 PG (ref 26.5–33)
MCHC RBC AUTO-ENTMCNC: 33.1 G/DL (ref 31.5–36.5)
MCV RBC AUTO: 92 FL (ref 78–100)
PLATELET # BLD AUTO: 227 10E3/UL (ref 150–450)
RBC # BLD AUTO: 4.15 10E6/UL (ref 3.8–5.2)
T PALLIDUM AB SER QL: NONREACTIVE
WBC # BLD AUTO: 7.6 10E3/UL (ref 4–11)

## 2022-08-02 PROCEDURE — 99207 PR PRENATAL VISIT: CPT | Performed by: OBSTETRICS & GYNECOLOGY

## 2022-08-02 PROCEDURE — 36415 COLL VENOUS BLD VENIPUNCTURE: CPT | Performed by: OBSTETRICS & GYNECOLOGY

## 2022-08-02 PROCEDURE — 90715 TDAP VACCINE 7 YRS/> IM: CPT | Performed by: OBSTETRICS & GYNECOLOGY

## 2022-08-02 PROCEDURE — 82950 GLUCOSE TEST: CPT | Performed by: OBSTETRICS & GYNECOLOGY

## 2022-08-02 PROCEDURE — 90471 IMMUNIZATION ADMIN: CPT | Performed by: OBSTETRICS & GYNECOLOGY

## 2022-08-02 PROCEDURE — 85027 COMPLETE CBC AUTOMATED: CPT | Performed by: OBSTETRICS & GYNECOLOGY

## 2022-08-02 PROCEDURE — 86780 TREPONEMA PALLIDUM: CPT | Performed by: OBSTETRICS & GYNECOLOGY

## 2022-08-02 NOTE — NURSING NOTE
"Initial BP 95/64 (BP Location: Right arm, Patient Position: Sitting, Cuff Size: Adult Regular)   Pulse 93   Temp 97.4  F (36.3  C) (Tympanic)   Resp 12   Ht 1.575 m (5' 2\")   Wt 67.4 kg (148 lb 9.6 oz)   LMP 01/22/2022   BMI 27.18 kg/m   Estimated body mass index is 27.18 kg/m  as calculated from the following:    Height as of this encounter: 1.575 m (5' 2\").    Weight as of this encounter: 67.4 kg (148 lb 9.6 oz). .      "

## 2022-08-02 NOTE — PROGRESS NOTES
Prior to immunization administration, verified patients identity using patient s name and date of birth. Please see Immunization Activity for additional information.     Screening Questionnaire for Adult Immunization    Are you sick today?   No   Do you have allergies to medications, food, a vaccine component or latex?   No   Have you ever had a serious reaction after receiving a vaccination?   No   Do you have a long-term health problem with heart, lung, kidney, or metabolic disease (e.g., diabetes), asthma, a blood disorder, no spleen, complement component deficiency, a cochlear implant, or a spinal fluid leak?  Are you on long-term aspirin therapy?   No   Do you have cancer, leukemia, HIV/AIDS, or any other immune system problem?   No   Do you have a parent, brother, or sister with an immune system problem?   No   In the past 3 months, have you taken medications that affect  your immune system, such as prednisone, other steroids, or anticancer drugs; drugs for the treatment of rheumatoid arthritis, Crohn s disease, or psoriasis; or have you had radiation treatments?   No   Have you had a seizure, or a brain or other nervous system problem?   No   During the past year, have you received a transfusion of blood or blood    products, or been given immune (gamma) globulin or antiviral drug?   No   For women: Are you pregnant or is there a chance you could become       pregnant during the next month?   Yes, pregnant   Have you received any vaccinations in the past 4 weeks?   No             Per orders of Dr. Colón, injection of TDAP given by Carter Arriaga MA. Patient instructed to remain in clinic for 15 minutes afterwards, and to report any adverse reaction to me immediately.       Screening performed by Carter Arriaga MA on 8/2/2022 at 10:22 AM.

## 2022-08-02 NOTE — PROGRESS NOTES
"[facility]    Return OB Note    CC: Return OB     Subjective:  Kyra is a 32 year old  at 27w23   Denies vaginal bleeding, loss of fluid, or regular contractions. Good fetal movement, keeping her up at night.  Complaints today: Some difficulties with sleeping    Objective:  BP 95/64 (BP Location: Right arm, Patient Position: Sitting, Cuff Size: Adult Regular)   Pulse 93   Temp 97.4  F (36.3  C) (Tympanic)   Resp 12   Ht 1.575 m (5' 2\")   Wt 67.4 kg (148 lb 9.6 oz)   LMP 2022   BMI 27.18 kg/m      Fundal height: 27cm  FHT: 150bpm    Assessment/Plan:   Encounter Diagnoses   Name Primary?     Prenatal care, subsequent pregnancy in second trimester Yes     History of postpartum hemorrhage        IUP at 27w3d  -History of post partum hemorrhage requiring D&C for retained placenta, subsequent atony with ex cheo and B john.   -History of pregnancy affected by microcephaly (subsequent termination): L2 normal  -Mid trimester labs today   -TDap today  -Strict return precautions given    RTC 2 weeks    Malathi Colón DO"

## 2022-08-09 ENCOUNTER — LAB (OUTPATIENT)
Dept: LAB | Facility: CLINIC | Age: 32
End: 2022-08-09
Payer: COMMERCIAL

## 2022-08-09 DIAGNOSIS — R73.09 ABNORMAL GLUCOSE TOLERANCE TEST: ICD-10-CM

## 2022-08-09 LAB
GESTATIONAL GTT 1 HR POST DOSE: 197 MG/DL (ref 60–179)
GESTATIONAL GTT 2 HR POST DOSE: 138 MG/DL (ref 60–154)
GESTATIONAL GTT 3 HR POST DOSE: 134 MG/DL (ref 60–139)
GLUCOSE P FAST SERPL-MCNC: 93 MG/DL (ref 60–94)

## 2022-08-09 PROCEDURE — 82951 GLUCOSE TOLERANCE TEST (GTT): CPT

## 2022-08-09 PROCEDURE — 82952 GTT-ADDED SAMPLES: CPT

## 2022-08-09 PROCEDURE — 36415 COLL VENOUS BLD VENIPUNCTURE: CPT

## 2022-08-10 NOTE — RESULT ENCOUNTER NOTE
Pt notified of below through interpretor services.  Pt reports understanding.  Pt does not have further questions or concerns.  Next clinic appointment 8/16.    Renee Day   Ob/Gyn Clinic  RN

## 2022-08-16 ENCOUNTER — PRENATAL OFFICE VISIT (OUTPATIENT)
Dept: OBGYN | Facility: CLINIC | Age: 32
End: 2022-08-16
Payer: COMMERCIAL

## 2022-08-16 VITALS
SYSTOLIC BLOOD PRESSURE: 93 MMHG | WEIGHT: 148 LBS | HEART RATE: 97 BPM | BODY MASS INDEX: 27.23 KG/M2 | RESPIRATION RATE: 16 BRPM | TEMPERATURE: 98.1 F | HEIGHT: 62 IN | DIASTOLIC BLOOD PRESSURE: 56 MMHG

## 2022-08-16 DIAGNOSIS — Z34.80 SUPERVISION OF OTHER NORMAL PREGNANCY, ANTEPARTUM: Primary | ICD-10-CM

## 2022-08-16 PROCEDURE — 99207 PR PRENATAL VISIT: CPT | Performed by: OBSTETRICS & GYNECOLOGY

## 2022-08-16 NOTE — PROGRESS NOTES
32 year old  at 29w3d     O+/RI.  NIPT nl XX.  S/p TDaP, failed GCT and passed GTT.  Considering LARC method for PPBC.  Planning to BF.   H/o PPH after retained placenta, D&C, SHIMON and ANGEL lopez.  hgb 12.6 on third trimester hgb studies.  Plan aggressive uterotonics after delivery.   H/o termination for lethal anomaly, normal level 2 US other than 4CH and lips not well seen, pt was recommended for follow-up to assure normal imaging but declines.    RTC 2 wks     Natacha Paredes MD, MPH  [facility]

## 2022-08-16 NOTE — NURSING NOTE
"Initial BP 93/56 (BP Location: Left arm, Patient Position: Chair, Cuff Size: Adult Regular)   Pulse 97   Temp 98.1  F (36.7  C) (Tympanic)   Resp 16   Ht 1.575 m (5' 2\")   Wt 67.1 kg (148 lb)   LMP 01/22/2022   BMI 27.07 kg/m   Estimated body mass index is 27.07 kg/m  as calculated from the following:    Height as of this encounter: 1.575 m (5' 2\").    Weight as of this encounter: 67.1 kg (148 lb). .      "

## 2022-09-13 ENCOUNTER — PRENATAL OFFICE VISIT (OUTPATIENT)
Dept: OBGYN | Facility: CLINIC | Age: 32
End: 2022-09-13
Payer: COMMERCIAL

## 2022-09-13 VITALS
RESPIRATION RATE: 14 BRPM | BODY MASS INDEX: 28.41 KG/M2 | TEMPERATURE: 97.6 F | HEIGHT: 62 IN | DIASTOLIC BLOOD PRESSURE: 62 MMHG | HEART RATE: 85 BPM | WEIGHT: 154.4 LBS | SYSTOLIC BLOOD PRESSURE: 107 MMHG

## 2022-09-13 DIAGNOSIS — Z87.59 HISTORY OF POSTPARTUM HEMORRHAGE: ICD-10-CM

## 2022-09-13 DIAGNOSIS — Z34.83 PRENATAL CARE, SUBSEQUENT PREGNANCY IN THIRD TRIMESTER: Primary | ICD-10-CM

## 2022-09-13 PROCEDURE — 99207 PR PRENATAL VISIT: CPT | Performed by: OBSTETRICS & GYNECOLOGY

## 2022-09-13 NOTE — NURSING NOTE
"Initial /62 (BP Location: Left arm, Patient Position: Sitting, Cuff Size: Adult Regular)   Pulse 85   Temp 97.6  F (36.4  C) (Tympanic)   Resp 14   Ht 1.575 m (5' 2\")   Wt 70 kg (154 lb 6.4 oz)   LMP 01/22/2022   BMI 28.24 kg/m   Estimated body mass index is 28.24 kg/m  as calculated from the following:    Height as of this encounter: 1.575 m (5' 2\").    Weight as of this encounter: 70 kg (154 lb 6.4 oz). .      "

## 2022-09-13 NOTE — PROGRESS NOTES
"[facility]    Return OB Note    CC: Return OB     Subjective:  Kyra is a 32 year old  at 33w3d   Denies vaginal bleeding, loss of fluid, or regular contractions. Good fetal movement.  Complaints today: More uncomfortable at night    Objective:  /62 (BP Location: Left arm, Patient Position: Sitting, Cuff Size: Adult Regular)   Pulse 85   Temp 97.6  F (36.4  C) (Tympanic)   Resp 14   Ht 1.575 m (5' 2\")   Wt 70 kg (154 lb 6.4 oz)   LMP 2022   BMI 28.24 kg/m      Fundal height: 33cm  FHT: 155bpm    Assessment/Plan:   Encounter Diagnoses   Name Primary?     Prenatal care, subsequent pregnancy in third trimester Yes     History of postpartum hemorrhage        IUP at 33w3d  -Incomplete anatomy US, declined subsequent follow up  -History of post partum hemorrhage requiring D&C for retained placenta, subsequent atony with ex lap and B lopez.   -History of pregnancy affected by microcephaly (subsequent termination): L2 normal  -Mid trimester labs: failed 1 hr, passed 3 hr GTT  -S/p TDap  -Strict return precautions given    RTC 2 weeks    Malathi Colón DO    "

## 2022-09-27 ENCOUNTER — PRENATAL OFFICE VISIT (OUTPATIENT)
Dept: OBGYN | Facility: CLINIC | Age: 32
End: 2022-09-27
Payer: COMMERCIAL

## 2022-09-27 VITALS
HEART RATE: 106 BPM | DIASTOLIC BLOOD PRESSURE: 69 MMHG | TEMPERATURE: 96.9 F | HEIGHT: 62 IN | WEIGHT: 157.6 LBS | RESPIRATION RATE: 12 BRPM | BODY MASS INDEX: 29 KG/M2 | SYSTOLIC BLOOD PRESSURE: 107 MMHG

## 2022-09-27 DIAGNOSIS — Z34.83 PRENATAL CARE, SUBSEQUENT PREGNANCY IN THIRD TRIMESTER: Primary | ICD-10-CM

## 2022-09-27 DIAGNOSIS — O26.843 UTERINE SIZE-DATE DISCREPANCY IN THIRD TRIMESTER: ICD-10-CM

## 2022-09-27 DIAGNOSIS — Z87.59 HISTORY OF POSTPARTUM HEMORRHAGE: ICD-10-CM

## 2022-09-27 PROCEDURE — 87653 STREP B DNA AMP PROBE: CPT | Performed by: OBSTETRICS & GYNECOLOGY

## 2022-09-27 PROCEDURE — 99207 PR PRENATAL VISIT: CPT | Performed by: OBSTETRICS & GYNECOLOGY

## 2022-09-27 NOTE — PROGRESS NOTES
"[facility]    Return OB Note    CC: Return OB     Subjective:  Kyra is a 32 year old  at 35w3d   Denies vaginal bleeding, loss of fluid, or regular contractions. Good fetal movement.  Complaints today: Feeling more pelvic pressure.    Objective:  /69 (BP Location: Right arm, Patient Position: Sitting, Cuff Size: Adult Regular)   Pulse 106   Temp 96.9  F (36.1  C) (Tympanic)   Resp 12   Ht 1.575 m (5' 2\")   Wt 71.5 kg (157 lb 9.6 oz)   LMP 2022   BMI 28.83 kg/m      Fundal height: 33cm  FHT: 155bpm  SVE:     Assessment/Plan:   Encounter Diagnoses   Name Primary?     Prenatal care, subsequent pregnancy in third trimester Yes     History of postpartum hemorrhage      Uterine size-date discrepancy in third trimester        IUP at 35w3d  -Incomplete anatomy US, declined subsequent follow up  -History of post partum hemorrhage requiring D&C for retained placenta, subsequent atony with ex lap and ANGEL lopez.   -History of pregnancy affected by microcephaly (subsequent termination): L2 normal  -Mid trimester labs: failed 1 hr, passed 3 hr GTT  -S/p TDap  -GBS today  -Growth US for S<D  -Strict return precautions given    RTC 1 weeks    Malathi Colón DO    "

## 2022-09-27 NOTE — NURSING NOTE
"Initial /69 (BP Location: Right arm, Patient Position: Sitting, Cuff Size: Adult Regular)   Pulse 106   Temp 96.9  F (36.1  C) (Tympanic)   Resp 12   Ht 1.575 m (5' 2\")   Wt 71.5 kg (157 lb 9.6 oz)   LMP 01/22/2022   BMI 28.83 kg/m   Estimated body mass index is 28.83 kg/m  as calculated from the following:    Height as of this encounter: 1.575 m (5' 2\").    Weight as of this encounter: 71.5 kg (157 lb 9.6 oz). .      " Complex Repair And W Plasty Text: The defect edges were debeveled with a #15 scalpel blade.  The primary defect was closed partially with a complex linear closure.  Given the location of the remaining defect, shape of the defect and the proximity to free margins a W plasty was deemed most appropriate for complete closure of the defect.  Using a sterile surgical marker, an appropriate advancement flap was drawn incorporating the defect and placing the expected incisions within the relaxed skin tension lines where possible.    The area thus outlined was incised deep to adipose tissue with a #15 scalpel blade.  The skin margins were undermined to an appropriate distance in all directions utilizing iris scissors.

## 2022-09-28 LAB — GP B STREP DNA SPEC QL NAA+PROBE: NEGATIVE

## 2022-10-04 ENCOUNTER — HOSPITAL ENCOUNTER (OUTPATIENT)
Dept: ULTRASOUND IMAGING | Facility: CLINIC | Age: 32
Discharge: HOME | End: 2022-10-04
Attending: OBSTETRICS & GYNECOLOGY | Admitting: OBSTETRICS & GYNECOLOGY
Payer: COMMERCIAL

## 2022-10-04 DIAGNOSIS — O26.843 UTERINE SIZE-DATE DISCREPANCY IN THIRD TRIMESTER: ICD-10-CM

## 2022-10-04 PROCEDURE — 76816 OB US FOLLOW-UP PER FETUS: CPT

## 2022-10-11 ENCOUNTER — PRENATAL OFFICE VISIT (OUTPATIENT)
Dept: OBGYN | Facility: CLINIC | Age: 32
End: 2022-10-11
Payer: COMMERCIAL

## 2022-10-11 VITALS
DIASTOLIC BLOOD PRESSURE: 66 MMHG | SYSTOLIC BLOOD PRESSURE: 109 MMHG | RESPIRATION RATE: 16 BRPM | HEIGHT: 62 IN | HEART RATE: 95 BPM | TEMPERATURE: 98.7 F | BODY MASS INDEX: 29.81 KG/M2 | WEIGHT: 162 LBS

## 2022-10-11 DIAGNOSIS — Z3A.37 37 WEEKS GESTATION OF PREGNANCY: Primary | ICD-10-CM

## 2022-10-11 DIAGNOSIS — Z87.59 HISTORY OF POSTPARTUM HEMORRHAGE: ICD-10-CM

## 2022-10-11 PROCEDURE — 99207 PR PRENATAL VISIT: CPT | Performed by: OBSTETRICS & GYNECOLOGY

## 2022-10-11 PROCEDURE — 59426 ANTEPARTUM CARE ONLY: CPT | Performed by: OBSTETRICS & GYNECOLOGY

## 2022-10-11 NOTE — NURSING NOTE
"Initial /66 (BP Location: Right arm, Patient Position: Chair, Cuff Size: Adult Regular)   Pulse 95   Temp 98.7  F (37.1  C) (Tympanic)   Resp 16   Ht 1.575 m (5' 2\")   Wt 73.5 kg (162 lb)   LMP 01/22/2022   BMI 29.63 kg/m   Estimated body mass index is 29.63 kg/m  as calculated from the following:    Height as of this encounter: 1.575 m (5' 2\").    Weight as of this encounter: 73.5 kg (162 lb). .      "

## 2022-10-11 NOTE — PROGRESS NOTES
"[facility]    Return OB Note    CC: Return OB     Subjective:  Kyra is a 32 year old  at 37w3d   Denies vaginal bleeding, loss of fluid, or regular contractions. Pos fetal movement. No headache, visual disturbance or RUQ pain.  Complaints today: contractions at night.  Has hip pain that makes it difficult to roll over at night.    Objective:  /66 (BP Location: Right arm, Patient Position: Chair, Cuff Size: Adult Regular)   Pulse 95   Temp 98.7  F (37.1  C) (Tympanic)   Resp 16   Ht 1.575 m (5' 2\")   Wt 73.5 kg (162 lb)   LMP 2022   BMI 29.63 kg/m      See flowsheet      Assessment/Plan:       32 year old year old  female with IUP at 37w3d  Encounter Diagnoses   Name Primary?     History of postpartum hemorrhage      37 weeks gestation of pregnancy Yes       -Incomplete anatomy US, declined subsequent follow up  -History of post partum hemorrhage requiring D&C for retained placenta, subsequent atony with ex cheo and ANGEL lopez.   -History of pregnancy affected by microcephaly (subsequent termination): L2 normal  -Mid trimester labs: failed 1 hr, passed 3 hr GTT  -S/p TDap  -GBS neg  -Growth US for S<D, EFW 15%ile, AC 20%ile, repeat growth in 2 weeks if still pregnant.  Return precautions given  Discussed  labor and preeclampsia precautions.  Discussed fetal movement precautions.  All discussion held with  service.    RTC 1 weeks    Ashely Bolden MD  "

## 2022-10-13 ENCOUNTER — NURSE TRIAGE (OUTPATIENT)
Dept: NURSING | Facility: CLINIC | Age: 32
End: 2022-10-13

## 2022-10-14 ENCOUNTER — HOSPITAL ENCOUNTER (INPATIENT)
Facility: CLINIC | Age: 32
LOS: 1 days | Discharge: HOME | End: 2022-10-15
Attending: FAMILY MEDICINE | Admitting: FAMILY MEDICINE
Payer: COMMERCIAL

## 2022-10-14 DIAGNOSIS — Z34.90 TERM PREGNANCY: Primary | ICD-10-CM

## 2022-10-14 LAB
ABO/RH(D): NORMAL
ALBUMIN UR-MCNC: NEGATIVE MG/DL
ANTIBODY SCREEN: NEGATIVE
APPEARANCE UR: CLEAR
BACTERIA #/AREA URNS HPF: ABNORMAL /HPF
BILIRUB UR QL STRIP: NEGATIVE
COLOR UR AUTO: ABNORMAL
ERYTHROCYTE [DISTWIDTH] IN BLOOD BY AUTOMATED COUNT: 13.7 % (ref 10–15)
GLUCOSE UR STRIP-MCNC: NEGATIVE MG/DL
HCT VFR BLD AUTO: 38.6 % (ref 35–47)
HGB BLD-MCNC: 13.2 G/DL (ref 11.7–15.7)
HGB UR QL STRIP: ABNORMAL
KETONES UR STRIP-MCNC: NEGATIVE MG/DL
LEUKOCYTE ESTERASE UR QL STRIP: NEGATIVE
MCH RBC QN AUTO: 30.1 PG (ref 26.5–33)
MCHC RBC AUTO-ENTMCNC: 34.2 G/DL (ref 31.5–36.5)
MCV RBC AUTO: 88 FL (ref 78–100)
MUCOUS THREADS #/AREA URNS LPF: PRESENT /LPF
NITRATE UR QL: NEGATIVE
PH UR STRIP: 6 [PH] (ref 5–7)
PLATELET # BLD AUTO: 242 10E3/UL (ref 150–450)
RBC # BLD AUTO: 4.39 10E6/UL (ref 3.8–5.2)
RBC URINE: <1 /HPF
SARS-COV-2 RNA RESP QL NAA+PROBE: NEGATIVE
SP GR UR STRIP: 1.01 (ref 1–1.03)
SPECIMEN EXPIRATION DATE: NORMAL
SQUAMOUS EPITHELIAL: 1 /HPF
T PALLIDUM AB SER QL: NONREACTIVE
UROBILINOGEN UR STRIP-MCNC: NORMAL MG/DL
WBC # BLD AUTO: 9.5 10E3/UL (ref 4–11)
WBC URINE: 2 /HPF

## 2022-10-14 PROCEDURE — 120N000001 HC R&B MED SURG/OB

## 2022-10-14 PROCEDURE — 722N000001 HC LABOR CARE VAGINAL DELIVERY SINGLE

## 2022-10-14 PROCEDURE — U0003 INFECTIOUS AGENT DETECTION BY NUCLEIC ACID (DNA OR RNA); SEVERE ACUTE RESPIRATORY SYNDROME CORONAVIRUS 2 (SARS-COV-2) (CORONAVIRUS DISEASE [COVID-19]), AMPLIFIED PROBE TECHNIQUE, MAKING USE OF HIGH THROUGHPUT TECHNOLOGIES AS DESCRIBED BY CMS-2020-01-R: HCPCS | Performed by: FAMILY MEDICINE

## 2022-10-14 PROCEDURE — 59410 OBSTETRICAL CARE: CPT | Performed by: FAMILY MEDICINE

## 2022-10-14 PROCEDURE — 81001 URINALYSIS AUTO W/SCOPE: CPT | Performed by: FAMILY MEDICINE

## 2022-10-14 PROCEDURE — 10907ZC DRAINAGE OF AMNIOTIC FLUID, THERAPEUTIC FROM PRODUCTS OF CONCEPTION, VIA NATURAL OR ARTIFICIAL OPENING: ICD-10-PCS | Performed by: FAMILY MEDICINE

## 2022-10-14 PROCEDURE — 86780 TREPONEMA PALLIDUM: CPT | Performed by: FAMILY MEDICINE

## 2022-10-14 PROCEDURE — 250N000013 HC RX MED GY IP 250 OP 250 PS 637: Performed by: FAMILY MEDICINE

## 2022-10-14 PROCEDURE — 250N000011 HC RX IP 250 OP 636: Performed by: FAMILY MEDICINE

## 2022-10-14 PROCEDURE — 85027 COMPLETE CBC AUTOMATED: CPT | Performed by: FAMILY MEDICINE

## 2022-10-14 PROCEDURE — G0463 HOSPITAL OUTPT CLINIC VISIT: HCPCS

## 2022-10-14 PROCEDURE — 86901 BLOOD TYPING SEROLOGIC RH(D): CPT | Performed by: FAMILY MEDICINE

## 2022-10-14 RX ORDER — METHYLERGONOVINE MALEATE 0.2 MG/ML
200 INJECTION INTRAVENOUS
Status: DISCONTINUED | OUTPATIENT
Start: 2022-10-14 | End: 2022-10-15 | Stop reason: HOSPADM

## 2022-10-14 RX ORDER — HYDROCORTISONE 25 MG/G
CREAM TOPICAL 3 TIMES DAILY PRN
Status: DISCONTINUED | OUTPATIENT
Start: 2022-10-14 | End: 2022-10-15 | Stop reason: HOSPADM

## 2022-10-14 RX ORDER — OXYTOCIN/0.9 % SODIUM CHLORIDE 30/500 ML
340 PLASTIC BAG, INJECTION (ML) INTRAVENOUS CONTINUOUS PRN
Status: DISCONTINUED | OUTPATIENT
Start: 2022-10-14 | End: 2022-10-15 | Stop reason: HOSPADM

## 2022-10-14 RX ORDER — BISACODYL 10 MG
10 SUPPOSITORY, RECTAL RECTAL DAILY PRN
Status: DISCONTINUED | OUTPATIENT
Start: 2022-10-14 | End: 2022-10-15 | Stop reason: HOSPADM

## 2022-10-14 RX ORDER — KETOROLAC TROMETHAMINE 30 MG/ML
30 INJECTION, SOLUTION INTRAMUSCULAR; INTRAVENOUS
Status: DISCONTINUED | OUTPATIENT
Start: 2022-10-14 | End: 2022-10-15 | Stop reason: HOSPADM

## 2022-10-14 RX ORDER — DOCUSATE SODIUM 100 MG/1
100 CAPSULE, LIQUID FILLED ORAL DAILY
Status: DISCONTINUED | OUTPATIENT
Start: 2022-10-14 | End: 2022-10-15 | Stop reason: HOSPADM

## 2022-10-14 RX ORDER — METOCLOPRAMIDE HYDROCHLORIDE 5 MG/ML
10 INJECTION INTRAMUSCULAR; INTRAVENOUS EVERY 6 HOURS PRN
Status: DISCONTINUED | OUTPATIENT
Start: 2022-10-14 | End: 2022-10-14 | Stop reason: HOSPADM

## 2022-10-14 RX ORDER — IBUPROFEN 800 MG/1
800 TABLET, FILM COATED ORAL
Status: DISCONTINUED | OUTPATIENT
Start: 2022-10-14 | End: 2022-10-15 | Stop reason: HOSPADM

## 2022-10-14 RX ORDER — ACETAMINOPHEN 325 MG/1
650 TABLET ORAL EVERY 4 HOURS PRN
Status: DISCONTINUED | OUTPATIENT
Start: 2022-10-14 | End: 2022-10-15 | Stop reason: HOSPADM

## 2022-10-14 RX ORDER — NALOXONE HYDROCHLORIDE 0.4 MG/ML
0.2 INJECTION, SOLUTION INTRAMUSCULAR; INTRAVENOUS; SUBCUTANEOUS
Status: DISCONTINUED | OUTPATIENT
Start: 2022-10-14 | End: 2022-10-14 | Stop reason: HOSPADM

## 2022-10-14 RX ORDER — PROCHLORPERAZINE MALEATE 5 MG
10 TABLET ORAL EVERY 6 HOURS PRN
Status: DISCONTINUED | OUTPATIENT
Start: 2022-10-14 | End: 2022-10-14 | Stop reason: HOSPADM

## 2022-10-14 RX ORDER — MODIFIED LANOLIN
OINTMENT (GRAM) TOPICAL
Status: DISCONTINUED | OUTPATIENT
Start: 2022-10-14 | End: 2022-10-15 | Stop reason: HOSPADM

## 2022-10-14 RX ORDER — OXYTOCIN/0.9 % SODIUM CHLORIDE 30/500 ML
100-340 PLASTIC BAG, INJECTION (ML) INTRAVENOUS CONTINUOUS PRN
Status: DISCONTINUED | OUTPATIENT
Start: 2022-10-14 | End: 2022-10-15 | Stop reason: HOSPADM

## 2022-10-14 RX ORDER — IBUPROFEN 800 MG/1
800 TABLET, FILM COATED ORAL EVERY 6 HOURS PRN
Status: DISCONTINUED | OUTPATIENT
Start: 2022-10-14 | End: 2022-10-15 | Stop reason: HOSPADM

## 2022-10-14 RX ORDER — NALOXONE HYDROCHLORIDE 0.4 MG/ML
0.4 INJECTION, SOLUTION INTRAMUSCULAR; INTRAVENOUS; SUBCUTANEOUS
Status: DISCONTINUED | OUTPATIENT
Start: 2022-10-14 | End: 2022-10-14 | Stop reason: HOSPADM

## 2022-10-14 RX ORDER — CARBOPROST TROMETHAMINE 250 UG/ML
250 INJECTION, SOLUTION INTRAMUSCULAR
Status: DISCONTINUED | OUTPATIENT
Start: 2022-10-14 | End: 2022-10-14 | Stop reason: HOSPADM

## 2022-10-14 RX ORDER — ONDANSETRON 4 MG/1
4 TABLET, ORALLY DISINTEGRATING ORAL EVERY 6 HOURS PRN
Status: DISCONTINUED | OUTPATIENT
Start: 2022-10-14 | End: 2022-10-14 | Stop reason: HOSPADM

## 2022-10-14 RX ORDER — CITRIC ACID/SODIUM CITRATE 334-500MG
30 SOLUTION, ORAL ORAL
Status: DISCONTINUED | OUTPATIENT
Start: 2022-10-14 | End: 2022-10-14 | Stop reason: HOSPADM

## 2022-10-14 RX ORDER — OXYTOCIN/0.9 % SODIUM CHLORIDE 30/500 ML
340 PLASTIC BAG, INJECTION (ML) INTRAVENOUS CONTINUOUS PRN
Status: DISCONTINUED | OUTPATIENT
Start: 2022-10-14 | End: 2022-10-14 | Stop reason: HOSPADM

## 2022-10-14 RX ORDER — METOCLOPRAMIDE 5 MG/1
10 TABLET ORAL EVERY 6 HOURS PRN
Status: DISCONTINUED | OUTPATIENT
Start: 2022-10-14 | End: 2022-10-14 | Stop reason: HOSPADM

## 2022-10-14 RX ORDER — TRANEXAMIC ACID 10 MG/ML
1 INJECTION, SOLUTION INTRAVENOUS EVERY 30 MIN PRN
Status: DISCONTINUED | OUTPATIENT
Start: 2022-10-14 | End: 2022-10-15 | Stop reason: HOSPADM

## 2022-10-14 RX ORDER — TRANEXAMIC ACID 10 MG/ML
1 INJECTION, SOLUTION INTRAVENOUS EVERY 30 MIN PRN
Status: DISCONTINUED | OUTPATIENT
Start: 2022-10-14 | End: 2022-10-14 | Stop reason: HOSPADM

## 2022-10-14 RX ORDER — MISOPROSTOL 200 UG/1
400 TABLET ORAL
Status: DISCONTINUED | OUTPATIENT
Start: 2022-10-14 | End: 2022-10-14 | Stop reason: HOSPADM

## 2022-10-14 RX ORDER — CARBOPROST TROMETHAMINE 250 UG/ML
250 INJECTION, SOLUTION INTRAMUSCULAR
Status: DISCONTINUED | OUTPATIENT
Start: 2022-10-14 | End: 2022-10-15 | Stop reason: HOSPADM

## 2022-10-14 RX ORDER — OXYTOCIN 10 [USP'U]/ML
10 INJECTION, SOLUTION INTRAMUSCULAR; INTRAVENOUS
Status: COMPLETED | OUTPATIENT
Start: 2022-10-14 | End: 2022-10-14

## 2022-10-14 RX ORDER — ONDANSETRON 2 MG/ML
4 INJECTION INTRAMUSCULAR; INTRAVENOUS EVERY 6 HOURS PRN
Status: DISCONTINUED | OUTPATIENT
Start: 2022-10-14 | End: 2022-10-14 | Stop reason: HOSPADM

## 2022-10-14 RX ORDER — MISOPROSTOL 200 UG/1
400 TABLET ORAL
Status: DISCONTINUED | OUTPATIENT
Start: 2022-10-14 | End: 2022-10-15 | Stop reason: HOSPADM

## 2022-10-14 RX ORDER — METHYLERGONOVINE MALEATE 0.2 MG/ML
200 INJECTION INTRAVENOUS
Status: DISCONTINUED | OUTPATIENT
Start: 2022-10-14 | End: 2022-10-14 | Stop reason: HOSPADM

## 2022-10-14 RX ORDER — OXYTOCIN 10 [USP'U]/ML
10 INJECTION, SOLUTION INTRAMUSCULAR; INTRAVENOUS
Status: DISCONTINUED | OUTPATIENT
Start: 2022-10-14 | End: 2022-10-15 | Stop reason: HOSPADM

## 2022-10-14 RX ORDER — PROCHLORPERAZINE 25 MG
25 SUPPOSITORY, RECTAL RECTAL EVERY 12 HOURS PRN
Status: DISCONTINUED | OUTPATIENT
Start: 2022-10-14 | End: 2022-10-14 | Stop reason: HOSPADM

## 2022-10-14 RX ORDER — FENTANYL CITRATE 50 UG/ML
50 INJECTION, SOLUTION INTRAMUSCULAR; INTRAVENOUS EVERY 30 MIN PRN
Status: DISCONTINUED | OUTPATIENT
Start: 2022-10-14 | End: 2022-10-14 | Stop reason: HOSPADM

## 2022-10-14 RX ORDER — OXYTOCIN 10 [USP'U]/ML
10 INJECTION, SOLUTION INTRAMUSCULAR; INTRAVENOUS
Status: DISCONTINUED | OUTPATIENT
Start: 2022-10-14 | End: 2022-10-14 | Stop reason: HOSPADM

## 2022-10-14 RX ADMIN — FENTANYL CITRATE 50 MCG: 50 INJECTION, SOLUTION INTRAMUSCULAR; INTRAVENOUS at 05:01

## 2022-10-14 RX ADMIN — FENTANYL CITRATE 50 MCG: 50 INJECTION, SOLUTION INTRAMUSCULAR; INTRAVENOUS at 02:26

## 2022-10-14 RX ADMIN — IBUPROFEN 800 MG: 800 TABLET, FILM COATED ORAL at 08:24

## 2022-10-14 RX ADMIN — OXYTOCIN 10 UNITS: 10 INJECTION, SOLUTION INTRAMUSCULAR; INTRAVENOUS at 06:52

## 2022-10-14 RX ADMIN — IBUPROFEN 800 MG: 800 TABLET, FILM COATED ORAL at 20:44

## 2022-10-14 RX ADMIN — DOCUSATE SODIUM 100 MG: 100 CAPSULE ORAL at 14:55

## 2022-10-14 RX ADMIN — FENTANYL CITRATE 50 MCG: 50 INJECTION, SOLUTION INTRAMUSCULAR; INTRAVENOUS at 03:38

## 2022-10-14 RX ADMIN — IBUPROFEN 800 MG: 800 TABLET, FILM COATED ORAL at 14:56

## 2022-10-14 RX ADMIN — FENTANYL CITRATE 50 MCG: 50 INJECTION, SOLUTION INTRAMUSCULAR; INTRAVENOUS at 05:31

## 2022-10-14 ASSESSMENT — ACTIVITIES OF DAILY LIVING (ADL)
ADLS_ACUITY_SCORE: 18

## 2022-10-14 NOTE — PROGRESS NOTES
Getting some relief from contraction pain. Pain went from an 8 out of 10 down to a 4. Is wanting to rest.  is by her side and supportive

## 2022-10-14 NOTE — PROGRESS NOTES
Pt ambulated to 355. Oriented to PP POC and paperwork. Bottling baby. Requested Ibuprofen for cramping pain. Pt understands minimal English. Using iPad  when necessary. Flow minimal. Fundus firm at U.

## 2022-10-14 NOTE — PROGRESS NOTES
S:  Admission  B:  Kyra is a  @ 37w6d gestation, GBS negative, Hep. B negative, pregnancy uncomplicated. Hx of Previous hemophage with last vaginal delivery. .  A:  Patient admitted to room 333 for labor and delivery  R: set room up for anticipated vaginal delivery

## 2022-10-14 NOTE — PROGRESS NOTES
2 IV's/ saline locks started. Dr Addison here at bedside.  being used. Contractions are stronger and closer together. Patient requesting pain medication. Declines an epidural.

## 2022-10-14 NOTE — TELEPHONE ENCOUNTER
"OB Triage Call      Is patient's OB/Midwife with the formerly [facility] or [facility]? [facility]- Proceed with triage     Reason for call: Patient calling to report vaginal bleeding.    Assessment: , 37 weeks 5 days reports onset of bleeding at .  She reports bleeding with voiding and wiping 5-6X.  She reports bright red \"sticky\" blood \"like a mild menstrual period\". She denies urinary symptoms, recent intercourse, or pelvic exam. She denies weakness, confusion, or contractions.    Plan: Go to L&D    Patient [facility]    Patient's primary OB Provider is Dr Colón.      Per protocol recommendations Patient to be evaluated in L&D. Patient's primary OB is [facility].  Labor and delivery at [facility] ([phone]) notified of patient's pending arrival.  Report given to n/a.      Is patient's delivering hospital on divert? Yes- If patient's hospital of choice is on divert, explain to patient their current hospital of choice is not accepting patients. Review other [facility] locations with patient. Patient's second hospital of choice Delivering Hospital: [facility] ([phone]).  L&D notified of patient's pending arrival. Report given to Sylvie.  Route encounter to primary OB provider as .       37w5d    Estimated Date of Delivery: Oct 29, 2022        OB History    Para Term  AB Living   5 3 2 1 1 2   SAB IAB Ectopic Multiple Live Births   1 0 0 0 2      # Outcome Date GA Lbr Juan/2nd Weight Sex Delivery Anes PTL Lv   5 Current            4 Term 20 38w5d 04:20  00:20 3.204 kg (7 lb 1 oz) F Vag-Spont IV N LEIGHTON      Complications: Hemorrhage, Cord Rupture, Retained complete placenta      Name: Angeli      Apgar1: 8  Apgar5: 8   3  19 26w0d             Birth Comments: Microcephaly-elective termination at 26 weeks with D&C in [place]   2 SAB 18           1 Term 17 38w5d 02:27 / 01:57 3.01 kg (6 lb 10.2 oz) F " Vag-Spont IV, Local N LEIGHTON      Complications: Other Excessive Bleeding, Prolonged PROM (>18 hours)      Name: Mindy      Apgar1: 8  Apgar5: 9      Obstetric Comments   6/26/19: Elective termination at 26 weeks - done in [place] - microcephaly       Lab Results   Component Value Date    GBS Negative 11/30/2020          Edith Nazario RN 10/13/22 11:03 PM  [facility] Nurse Advisor    Reason for Disposition    MILD-MODERATE vaginal bleeding (e.g., small to medium clots; like mild menstrual period) (Exception: Single episode of faint spotting when wiping, or slight spotting after intercourse or pelvic exam)    Additional Information    Negative: Passed out (i.e., lost consciousness, collapsed and was not responding)    Negative: Shock suspected (e.g., cold/pale/clammy skin, too weak to stand, low BP, rapid pulse)    Negative: Difficult to awaken or acting confused (e.g., disoriented, slurred speech)    Negative: SEVERE vaginal bleeding (e.g., continuous red blood from vagina, large blood clots)    Negative: [1] SEVERE abdominal pain (e.g., excruciating) AND [2] constant AND [3] present > 1 hour    Negative: Sounds like a life-threatening emergency to the triager    Negative: [1] Vaginal bleeding AND [2] pregnant < 20 weeks    Negative: Abdominal pain or having contractions    Negative: Leakage of fluid from vagina (or caller thinks she has ruptured her bag of feliciano)    Protocols used: PREGNANCY - VAGINAL BLEEDING GREATER THAN 20 WEEKS [facility]-

## 2022-10-14 NOTE — H&P
October 14, 2022    Kyra Torres  [id number]            OB Admit History & Physical      Ms. Torres  is here in suspected early labor.    She has noticed some bleeding starting about 8 PM tonight.  This was similar in consistency to it.  But more sticky.  It was also less volume than she typically has with her.  She was having about 2 contractions per hour.  She was concerned about the bleeding and wanted to come to the hospital.  She had been planning to deliver at [place], but they are on divert tonight.  She looked at going to [facility], but they are also on divert.  Triage recommended that she go to a hospital with additional resources, but patient indicated she wanted to go here and was not willing to consider going anywhere else.      She has a history of excess vaginal bleeding with her first delivery secondary to a torn artery in the vaginal sulcus.  This responded to a figure-of-eight repair.    She subsequently had a termination for microcephaly.    At her last delivery, she required manual removal of the placenta in the OR.  She then did not respond to uterotonic's.  A Bakri balloon was placed.  When this also failed, she was taken to the OR and of the Chavez suture was placed during exploratory laparotomy.  She required transfusion of a total of 4 units of packed red cells, 2 units FFP and 1 unit of platelets.    During her pregnancy, she was referred to [facility] due to her history of microcephaly.  No concerning findings were found at that time.  She did have a recent ultrasound that showed estimated fetal weight at the 15th percentile and abdominal circumference at the 20th percentile.    Her Glucola screen was positive during pregnancy, but her 3-hour glucose tolerance test did not show gestational diabetes.    Patient's last menstrual period was 01/22/2022.   Her Estimated Date of Delivery: Oct 29, 2022  , making her 37w6d  wks.      Estimated body mass index is 29.63 kg/m  as calculated from the  "following:    Height as of 10/11/22: 1.575 m (5' 2\").    Weight as of 10/11/22: 73.5 kg (162 lb).  Her prenatal course has been complicated by those items noted above.    See prenatal for labs.  Negative GBBS, Rubella Immune, RH Positive    Estimated fetal weight= 3100 gm       She is a 32 year old   Her OB history:   OB History    Para Term  AB Living   5 3 2 1 1 2   SAB IAB Ectopic Multiple Live Births   1 0 0 0 2      # Outcome Date GA Lbr Juan/2nd Weight Sex Delivery Anes PTL Lv   5 Current            4 Term 20 38w5d 04:20  00:20 3.204 kg (7 lb 1 oz) F Vag-Spont IV N LEIGHTON      Complications: Hemorrhage, Cord Rupture, Retained complete placenta      Name: Angeli      Apgar1: 8  Apgar5: 8   3  19 26w0d             Birth Comments: Microcephaly-elective termination at 26 weeks with D&C in [place]   2 SAB 18           1 Term 17 38w5d 02:27 / 01:57 3.01 kg (6 lb 10.2 oz) F Vag-Spont IV, Local N LEIGHTON      Complications: Other Excessive Bleeding, Prolonged PROM (>18 hours)      Name: Mindy      Apgar1: 8  Apgar5: 9      Obstetric Comments   19: Elective termination at 26 weeks - done in [place] - microcephaly            Past Medical History:   Diagnosis Date     NO ACTIVE PROBLEMS           Past Surgical History:   Procedure Laterality Date     D & C      Termination 2019-Delivery and then D&C     DILATION AND CURETTAGE N/A 2020    Procedure: Exam under anesthesia, Manual removal of placenta, uterine curettage, placement of intrauterine tamponade balloon, repair of perineal laceraction.;  Surgeon: Malathi Colón DO;  Location: [facility] OR     LAPAROTOMY EXPLORATORY N/A 2020    Procedure: EXPLORATORY LAPAROTOMY,  placement of b lopez sutures, removal of gloria, repair of marcus uretheral laceration;  Surgeon: Malathi Colón DO;  Location: [facility] OR         No current outpatient medications on file.       Allergies: Patient has no known allergies.      REVIEW " OF SYSTEMS:  NEUROLOGIC:  Negative  EYES:  Negative  ENT:  Negative  GI:  Negative  BREAST:  Negative  :  Negative  GYN:  Negative  CV:  Negative  PULMONARY:  Negative  MUSCULOSKELETAL:  Negative  PSYCH:  Negative        Social History     Socioeconomic History     Marital status:      Spouse name: Not on file     Number of children: 1     Years of education: Not on file     Highest education level: Not on file   Occupational History     Not on file   Tobacco Use     Smoking status: Never     Smokeless tobacco: Never   Vaping Use     Vaping Use: Never used   Substance and Sexual Activity     Alcohol use: No     Drug use: No     Sexual activity: Yes     Partners: Male   Other Topics Concern     Parent/sibling w/ CABG, MI or angioplasty before 65F 55M? Not Asked   Social History Narrative     Not on file     Social Determinants of Health     Financial Resource Strain: Not on file   Food Insecurity: Not on file   Transportation Needs: Not on file   Physical Activity: Not on file   Stress: Not on file   Social Connections: Not on file   Intimate Partner Violence: Not on file   Housing Stability: Not on file      Family History   Problem Relation Age of Onset     Hypertension Mother              Vitals:   , reactive  With contractions every  2-4min    Alert Awake in NAD  HEENT grossly normal  Neck: no lymphadenopathy or thryoidomegaly  Lungs CTAB  Back no spinal or CVAT  Heart RRR, no MRG  ABD gravid, non-tender on exam with  fundus palpable  Pelvic:  scant fluid noted, minimal blood noted  Cervix is 4 cm / 70 % effaced at -1 station  EXT:  No edema or calf tenderness  Neuro:  Grossly normal    Assessment:  IUP at 37w6d  In spontaneous labor at term.  Appears to be fairly early in labor at this point.  History of severe PPH due to uterine atony requiring uterotonics, Bakri balloon and B-lopez suture (placed in exploratory laparotomy).    Plan:  Pain management per pt preference.  AROM if needed.   Anticipate NVD.  Plan aggressive/early use of uterotonics to prevent PPH.     [unfilled]      Spenser Addison MD, MD [facility]  Dept of OB/GYN  October 14, 2022

## 2022-10-14 NOTE — PROGRESS NOTES
S: Delivery  B: spontaneous Labor,  @ 51hzn5aexw gestation, GBS negative .  A: Patient delivered Vaginal at 0655 with Dr. bacon in attendance. Baby delivered and placed on mother's low abdomen for delayed cord clamping where baby was dried and stimulated. After cord clamped and cut, baby was placed skin to skin on mother's chest within 5 minutes following delivery . Apgars 9/9. Placenta was delivered @ 0655 followed by administration of oxytocin. Bonding initiated with mom and baby. Educated mother on importance of exclusive breast feeding, expected feeding readiness cues and encouraged her to observe for feeding cues. Mother informed that breast feeding assistance would be provided. See flowsheet for VS and PP checks. Labor care plan goals met.  R: Expect routine postpartum care. Anticipate first feeding within the hour.

## 2022-10-14 NOTE — PROGRESS NOTES
S: Triage Arrival  B: Kyra is a 32 y.o.  @ 37w 6d who presents with complaint of hx of hemorrhage with prior delivery. Stated she has been having vaginal bleeding since . States it has been like a moderate bright red flow. Has been having some irregular  uterine contractions.   A: EFM applied. FHT's135 with moderate variability, accels present, no decels noted on strip. Contractions 2-5 minutes  Dr Addison at bedside with use of  via I pad. .

## 2022-10-14 NOTE — PROGRESS NOTES
Dr. Addison here to perform AROM, fluid color  clear and mod amount noted. Cervix is now 7 cm. Patient repositioned and FHR stable after procedure. Will continue to observe for progress and recheck cervix in the next 1-2 hours.

## 2022-10-14 NOTE — L&D DELIVERY NOTE
OB Vaginal Delivery Note    Kyra Torres MRN# [id number]   Age: 32 year old YOB: 1990     Patient presented in spontaneous labor at 37 weeks 6 days.  She had approximately 3 hours of bloody show prior to arrival.  She had infrequent contractions at home, but the bloody show made her very nervous as she has a history of postpartum hemorrhage.  Upon arrival, blood appeared to be scant, patient was 4 cm dilated, and is juliet about every 4 minutes.    Please see admit H&P for the history of postpartum hemorrhage and the difficulties that arose at that time.    Patient was allowed to labor spontaneously.  She gradually progressed to 7 cm dilatation at which time, she requested AROM.  Pain was controlled up to this point with IV fentanyl.  She declines an epidural.  Did not want any other medication for pain control either.  Discussed that we should avoid IV fentanyl close to the time of delivery, so patient did not have any further fentanyl after AROM.    After AROM, her labor intensified as she rapidly progressed to complete dilatation in less than 30 minutes.  She easily delivered a healthy female over an intact perineum.    Mother and infant were doing well at the time of this notation.    GA: 37w6d  GP:   Labor Complications:    EBL:   mL  Delivery QBL:    Delivery Type: Vaginal, Spontaneous   ROM to Delivery Time: (Delivered) Minutes: 30  [place] Weight: 2.863 kg (6 lb 5 oz)    1 Minute 5 Minute 10 Minute   Apgar Totals: 9   9        RAMAN VAZQUEZ;LINO OLMEDO;BALDEMAR LEWIS     Delivery Details:  Kyra Torres, a 32 year old  female delivered a viable infant with apgars of 9  and 9 . Patient was fully dilated and pushing after 6  hours 15  minutes in active labor. Delivery was via vaginal, spontaneous  to a sterile field under   anesthesia. Infant delivered in vertex  left  occiput  anterior  position. Anterior and posterior shoulders delivered without difficulty. The cord  was clamped, cut twice and 3 vessels  were noted. Cord blood was obtained in routine fashion with the following disposition: lab .      Cord complications: none   Placenta delivered at 10/14/2022  6:55 AM . Placental disposition was Hospital disposal . Fundal massage performed and fundus found to be firm.     Episiotomy: none    Perineum, vagina, cervix were inspected, and the following lacerations were noted:   Perineal lacerations: 1st                Laceration was very small (approximately 1 cm), not bleeding significantly, and did not require repair.    Excellent hemostasis was noted. Needle count correct. Infant and patient in delivery room in good and stable condition.        Brian Female-Kyra [[id number]]    Labor Event Times    Labor onset date: 10/14/22 Onset time: 12:30 AM   Dilation complete date: 10/14/22 Complete time:  6:45 AM   Start pushing date/time: 10/14/2022 0650      Labor Length    1st Stage (hrs): 6 (min): 15   2nd Stage (hrs): 0 (min): 6   3rd Stage (hrs): 0 (min): 4      Rupture date/time: 10/14/22 0621   Rupture type: Artificial Rupture of Membranes  Fluid color: Clear  Fluid odor: Normal     Augmentation: AROM     Delivery/Placenta Date and Time    Delivery Date: 10/14/22 Delivery Time:  6:51 AM   Placenta Date/Time: 10/14/2022  6:55 AM  Oxytocin given at the time of delivery: after delivery of baby  Delivering clinician: Spenser Addison MD   Other personnel present at delivery:  Provider Role   Spenser Addison MD Eisenschenk, Jessica, RN Registered Nurse   Rivka Guardado RN Registered Nurse         Vaginal Counts     Initial count performed by 2 team members:  Two Team Members   Serina Guardado       Needles Suture Needles Sponges (RETIRED) Instruments   Initial counts 2  5    Added to count       Relief counts       Final counts 2  5          Placed during labor Accounted for at the end of labor   FSE NA    IUPC NA    Cervidil NA               Final  "count performed by 2 team members:  Two Team Members   xavier jaime Rn   mila saha      Final count correct?: Yes     Apgars    Living status: Living   1 Minute 5 Minute 10 Minute 15 Minute 20 Minute   Skin color: 1  1       Heart rate: 2  2       Reflex irritability: 2  2       Muscle tone: 2  2       Respiratory effort: 2  2       Total: 9  9       Apgars assigned by: MILA SAHA     Cord    Vessels: 3 Vessels    Cord Complications: None               Cord Blood Disposition: Lab    Gases Sent?: No    Delayed cord clamping?: Yes    Cord Clamping Delay (seconds):  seconds    Stem cell collection?: No       [place] Resuscitation    Methods: None     [place] Measurements    Weight: 6 lb 5 oz Length: 1' 7.75\"   Head circumference: 13 cm Chest circumference: 13.5 cm      Labor Events and Shoulder Dystocia    Fetal Tracing Prior to Delivery: Category 2  Shoulder dystocia present?: Neg     Delivery (Maternal) (Provider to Complete) (622025)    Episiotomy: None  Perineal lacerations: 1st    Repair suture: None     Blood Loss  Mother: Kyra Torres #[id number]   Start of Mother's Information    Delivery Blood Loss  10/14/22 0030 - 10/14/22 0935    None           End of Mother's Information  Mother: Kyra Torres #[id number]          Delivery - Provider to Complete ([id number])    Delivering clinician: Spenser Addison MD  Attempted Delivery Types (Choose all that apply): Spontaneous Vaginal Delivery  Delivery Type (Choose the 1 that will go to the Birth History): Vaginal, Spontaneous                   Other personnel:  Provider Role   Spenser Addison MD Eisenschenk, Jessica, RN Registered Nurse   Rivka Saha RN Registered Nurse                Placenta    Date/Time: 10/14/2022  6:55 AM  Removal: Spontaneous  Disposition: Hospital disposal           Presentation and Position    Presentation: Vertex    Position: Left Occiput Anterior                 Spenser Addison MD, MD  "

## 2022-10-15 VITALS
HEART RATE: 99 BPM | SYSTOLIC BLOOD PRESSURE: 104 MMHG | OXYGEN SATURATION: 96 % | RESPIRATION RATE: 20 BRPM | TEMPERATURE: 98.5 F | DIASTOLIC BLOOD PRESSURE: 53 MMHG

## 2022-10-15 LAB — HGB BLD-MCNC: 11.7 G/DL (ref 11.7–15.7)

## 2022-10-15 PROCEDURE — 36415 COLL VENOUS BLD VENIPUNCTURE: CPT | Performed by: FAMILY MEDICINE

## 2022-10-15 PROCEDURE — 250N000013 HC RX MED GY IP 250 OP 250 PS 637: Performed by: FAMILY MEDICINE

## 2022-10-15 PROCEDURE — 85018 HEMOGLOBIN: CPT | Performed by: FAMILY MEDICINE

## 2022-10-15 RX ORDER — DOCUSATE SODIUM 100 MG/1
100 CAPSULE, LIQUID FILLED ORAL 2 TIMES DAILY PRN
Qty: 30 CAPSULE | Refills: 0 | Status: SHIPPED | OUTPATIENT
Start: 2022-10-15

## 2022-10-15 RX ORDER — IBUPROFEN 200 MG
600 TABLET ORAL EVERY 6 HOURS PRN
COMMUNITY
Start: 2022-10-15

## 2022-10-15 RX ORDER — ACETAMINOPHEN 325 MG/1
650 TABLET ORAL EVERY 4 HOURS PRN
COMMUNITY
Start: 2022-10-15

## 2022-10-15 RX ADMIN — DOCUSATE SODIUM 100 MG: 100 CAPSULE ORAL at 09:12

## 2022-10-15 RX ADMIN — IBUPROFEN 800 MG: 800 TABLET, FILM COATED ORAL at 03:41

## 2022-10-15 ASSESSMENT — ACTIVITIES OF DAILY LIVING (ADL)
ADLS_ACUITY_SCORE: 18

## 2022-10-15 NOTE — DISCHARGE SUMMARY
[facility] Discharge Summary    Kyra Torres MRN# [id number]   Age: 32 year old YOB: 1990     Date of Admission:  10/14/2022  Date of Discharge::  10/15/2022  Admitting Physician:  Spenser Addison MD  Discharge Physician:  Sonal Carson MD     Home clinic: [facility]          Admission Diagnoses:   Supervision of high risk pregnancy  History of postpartum hemorrhage          Discharge Diagnosis:   Normal spontaneous vaginal delivery  Intrauterine pregnancy at 37 6/7 weeks gestation          Procedures:   Procedure(s): No additional procedures performed              Medications Prior to Admission:     Medications Prior to Admission   Medication Sig Dispense Refill Last Dose     [DISCONTINUED] Prenatal Multivit-Min-Fe-FA (PRE- FORMULA PO)    10/13/2022             Discharge Medications:     Current Discharge Medication List      START taking these medications    Details   acetaminophen (TYLENOL) 325 MG tablet Take 2 tablets (650 mg) by mouth every 4 hours as needed for mild pain or fever (greater than or equal to 38  C /100.4  F (oral) or 38.5  C/ 101.4  F (core).)    Associated Diagnoses:  (normal spontaneous vaginal delivery)      docusate sodium (COLACE) 100 MG capsule Take 1 capsule (100 mg) by mouth 2 times daily as needed for constipation  Qty: 30 capsule, Refills: 0    Associated Diagnoses:  (normal spontaneous vaginal delivery)      ibuprofen (ADVIL/MOTRIN) 200 MG tablet Take 3 tablets (600 mg) by mouth every 6 hours as needed for other (cramping)    Associated Diagnoses:  (normal spontaneous vaginal delivery)         CONTINUE these medications which have CHANGED    Details   Prenatal Multivit-Min-Fe-FA (PRE- FORMULA) TABS Take 1 tablet by mouth daily  Qty: 90 tablet, Refills: 0    Associated Diagnoses:  (normal spontaneous vaginal delivery)                   Consultations:   No consultations were requested during  this admission          Brief History of Labor:   Patient was admitted with spontaneous onset of labor.  She went on to deliver a healthy baby girl without complications.  Please see delivery summary for full details.              Hospital Course:   The patient's hospital course was unremarkable.  On discharge, her pain was well controlled. Vaginal bleeding is similar to peak menstrual flow. She had no evidence of PPH, her EBL was <200 ml. Voiding without difficulty.  Ambulating well and tolerating a normal diet.  No fever.  She is formula feeding her baby.  Infant is stable.  No bowel movement yet.  She was discharged on post-partum day #1 at her request. Not requiring any pain medication.    On the day of discharge her exam is as follows:    Vitals:    10/14/22 1230 10/14/22 2000 10/15/22 0340 10/15/22 0900   BP:  93/58 108/57 104/53   BP Location:   Right arm    Pulse: 96 91 89 99   Resp: 16   20   Temp: 98.8  F (37.1  C) 98.1  F (36.7  C) 98  F (36.7  C) 98.5  F (36.9  C)   TempSrc: Oral Oral Oral Oral   SpO2:  94% 96%       Vitals noted.  Patient alert, oriented, and in no acute distress. CV:  RRR without murmur. Respiratory:  Lungs clear to auscultation bilaterally. Abdomen:  Soft, tender with palpation of the uterine fundus which is firm and below the level of the umbilicus, nondistended with good bowel sounds and no masses or hepatosplenomegaly.  Extremities warm and dry without significant edema.       Post-partum hemoglobin:   Lab Results   Component Value Date    HGB 11.7 10/15/2022             Discharge Instructions and Follow-Up:   Discharge diet: Regular   Discharge activity: No sex for 6 week(s)   Discharge follow-up: Follow up [facility] in 6 weeks   Wound care: na           Discharge Disposition:   Discharged to home      Attestation:  I have reviewed today's vital signs, notes, medications, labs and imaging.    Sonal Carson MD

## 2022-10-15 NOTE — PLAN OF CARE
Goal Outcome Evaluation:    S: Discharge  to home    B: Patient had a Vaginal delivery with no complications. Baby girl Baby's name Rica, bottle in hospital, plans to start breastfeeding at home. Pt speaks Mandarin Chinese, understands some English. Support person's name Johnny.     A: No complaints of pain. Flow wnl. Handles baby confidently.     R:  Discharge instructions reviewed and questions answered.  Belongings gathered and returned to the patient. Agreed to follow up in 6 weeks or sooner with any question or concerns.     Nursing Discharge Checklist:    Pneumovax screened and given, if appropriate: N/A  Influenza vaccine screened and given, if appropriate: N/A  Staples removed (): N/A  Breast milk returned: N/A  Hydrogel pads sent home:N/A  Birth Certificate Done: YES  Public Health Referral Made: N/A

## 2022-10-15 NOTE — DISCHARGE INSTRUCTIONS
[facility] Discharge Instructions     Discharge disposition:  Discharged to home       Diet:  Regular       Activity No sex for 6 week(s)       Follow-up: Follow up with your OB DrCuong For a postpartum visit in 6 weeks       Additional instructions: The [facility] staff is available 24 hours 7 days for questions about you or your baby.  Please don't hesitate to call with any concerns.        Additional Patient Information:  Enjoy beautiful little Rica!    Postpartum Vaginal Delivery Instructions    Activity     Ask family and friends for help when you need it.  Do not place anything in your vagina for 6 weeks.  You are not restricted on other activities, but take it easy for a few weeks to allow your body to recover from delivery.  You are able to do any activities you feel up to that point.  No driving until you have stopped taking your pain medications (usually two weeks after delivery).     Call your health care provider if you have any of these symptoms:     Increased pain, swelling, redness, or fluid around your stiches from an episiotomy or perineal tear.  A fever above 100.4 F (38 C) with or without chills when placing a thermometer under your tongue.  You soak a sanitary pad with blood within 1 hour, or you see blood clots larger than a golf ball.  Bleeding that lasts more than 6 weeks.  Vaginal discharge that smells bad.  Severe pain, cramping or tenderness in your lower belly area.  A need to urinate more frequently (use the toilet more often), more urgently (use the toilet very quickly), or it burns when you urinate.  Nausea and vomiting.  Redness, swelling or pain around a vein in your leg.  Problems breastfeeding or a red or painful area on your breast.  Chest pain and cough or are gasping for air.  Problems coping with sadness, anxiety, or depression.  If you have any concerns about hurting yourself or the baby, call your provider immediately.   You have questions or  concerns after you return home.     Keep your hands clean:  Always wash your hands before touching your perineal area and stitches.  This helps reduce your risk of infection.  If your hands aren't dirty, you may use an alcohol hand-rub to clean your hands. Keep your nails clean and short.

## 2022-10-15 NOTE — PLAN OF CARE
Goal Outcome Evaluation:    22 hours post vaginal delivery over intact perineum. Fundus firm at U, scant to light bleeding. VSS. Pain being managed with PRN Ibuprofen. H/O PPH so saline lock remains in place until after morning lab draw. Bottle feeding baby, encouraged to not feed baby quite as much each time. Would like to discharge later today. Using Ipad for Mandarin  as needed.

## 2022-10-20 NOTE — PROGRESS NOTES
"Kyra Torres  Gender: female  : 1990  [address] [place] [facility] [address]  [phone] (home)   Medical Record: [id number]  Primary Care Provider: Fidelina Castro       [facility]   ?   Discharge Phone Call: Key Words/Key Times     How are you and the baby? good    How are feedings going? good    Voiding & Stooling? good    Any questions or concerns? no    Follow-up appointment? \"Yes on Tuesday we had an appt\"      We want to provide excellent care here at [facility]. Do you have any feedback for us that would help us improve?     Call back COMMENTS:   \"All was good\"      Attempted Calls:   _10/ 1112 callback completed per JAMILAH Cassidy RN________     __________    "

## 2022-11-29 ENCOUNTER — PRENATAL OFFICE VISIT (OUTPATIENT)
Dept: OBGYN | Facility: CLINIC | Age: 32
End: 2022-11-29
Payer: COMMERCIAL

## 2022-11-29 VITALS
WEIGHT: 149 LBS | HEIGHT: 62 IN | RESPIRATION RATE: 14 BRPM | TEMPERATURE: 97.6 F | HEART RATE: 98 BPM | BODY MASS INDEX: 27.42 KG/M2 | SYSTOLIC BLOOD PRESSURE: 110 MMHG | DIASTOLIC BLOOD PRESSURE: 75 MMHG

## 2022-11-29 DIAGNOSIS — Z23 NEED FOR PROPHYLACTIC VACCINATION AND INOCULATION AGAINST INFLUENZA: Primary | ICD-10-CM

## 2022-11-29 PROCEDURE — 90471 IMMUNIZATION ADMIN: CPT | Performed by: OBSTETRICS & GYNECOLOGY

## 2022-11-29 PROCEDURE — 99207 PR POST PARTUM EXAM: CPT | Performed by: OBSTETRICS & GYNECOLOGY

## 2022-11-29 PROCEDURE — 90686 IIV4 VACC NO PRSV 0.5 ML IM: CPT | Performed by: OBSTETRICS & GYNECOLOGY

## 2022-11-29 ASSESSMENT — ANXIETY QUESTIONNAIRES
7. FEELING AFRAID AS IF SOMETHING AWFUL MIGHT HAPPEN: NOT AT ALL
2. NOT BEING ABLE TO STOP OR CONTROL WORRYING: NOT AT ALL
3. WORRYING TOO MUCH ABOUT DIFFERENT THINGS: NOT AT ALL
GAD7 TOTAL SCORE: 0
6. BECOMING EASILY ANNOYED OR IRRITABLE: NOT AT ALL
1. FEELING NERVOUS, ANXIOUS, OR ON EDGE: NOT AT ALL
GAD7 TOTAL SCORE: 0
5. BEING SO RESTLESS THAT IT IS HARD TO SIT STILL: NOT AT ALL

## 2022-11-29 ASSESSMENT — PATIENT HEALTH QUESTIONNAIRE - PHQ9
5. POOR APPETITE OR OVEREATING: NOT AT ALL
SUM OF ALL RESPONSES TO PHQ QUESTIONS 1-9: 0

## 2022-11-29 NOTE — PROGRESS NOTES
"[facility]    Post Partum Office Visit    CC: Post partum visit    HPI: Kyra Torres is a 32 year old  who presents for a 6 week post-partum visit.  Patient s/p vaginal delivery 6 weeks ago.  No complications.  Bleeding has stopped.  Does not want birth control.  Encounter performed with [facility]  service.     Information    Feeding Method: bottle    Maternal Information  Postpartum Depression: YES, No  Resumed [place]:  YES, No  Last Pap Smear: normal  Birth Control Method:none    ROS:  General/Constitutional:  Denies chills or fever  Respiratory: Denies shortness of breath, cough, wheezing   Cardiovascular: Denies chest pain, exertional pain, irregular heartbeat  Gastrointestinal:  Denies abdominal pain, constipation, nausea, or vomiting  Genitourinary: Denies hematuria, difficulty urinating, frequency, or dysuria   Skin: Denies dry skin, itching, rash, new skin lesions  Musculoskeletal: Denies aching muscles or joints, swelling in joints, back pain, shoulder pain, or painful feet, no peripheral edema  Psychiatric: Denies post partum depression     Physical Exam:   Vitals:    22 1102   BP: 110/75   BP Location: Right arm   Patient Position: Chair   Cuff Size: Adult Regular   Pulse: 98   Resp: 14   Temp: 97.6  F (36.4  C)   TempSrc: Tympanic   Weight: 67.6 kg (149 lb)   Height: 1.575 m (5' 2\")      Estimated body mass index is 27.25 kg/m  as calculated from the following:    Height as of this encounter: 1.575 m (5' 2\").    Weight as of this encounter: 67.6 kg (149 lb).    General appearance: well-hydrated, A&O x 3, no apparent distress  Lungs: Equal expansion bilaterally, no accessory muscle use  Heart: No heaves or thrills. No peripheral varicosities  Abdomen: Soft, non-tender, non-distended. No rebound, rigidity, or guarding.  Extremities: neg edema, no calf tenderness  Neuro: CN II-XII grossly intact  Genitourinary:  External genitalia: no erythema, no " lesions.   Urethral meatus appropriate location without lesions or prolapse  Urethra: No masses, tenderness, or scarring  Bladder no fullness, masses, or tenderness.  Anus and Perineum: Unremarkable, no visible lesions  Vagina: Normal, healthy pink mucosa without any lesions. Physiologic vaginal discharge.    Cervix: normal appearance, no cervical motion tenderness.   Uterus: involuted, normal size, shape and consistency.   Adnexa: no masses or tenderness bilaterally.      PHQ-9 score:    PHQ 11/29/2022   PHQ-9 Total Score 0   Q9: Thoughts of better off dead/self-harm past 2 weeks Not at all               Assessment and Plan:     Encounter Diagnosis   Name Primary?     Need for prophylactic vaccination and inoculation against influenza Yes       Appropriate post partum recovery.    Declines contraception and is OK if she gets pregnant soon.

## 2022-11-29 NOTE — NURSING NOTE
"Initial /75 (BP Location: Right arm, Patient Position: Chair, Cuff Size: Adult Regular)   Pulse 98   Temp 97.6  F (36.4  C) (Tympanic)   Resp 14   Ht 1.575 m (5' 2\")   Wt 67.6 kg (149 lb)   LMP 01/22/2022   Breastfeeding No   BMI 27.25 kg/m   Estimated body mass index is 27.25 kg/m  as calculated from the following:    Height as of this encounter: 1.575 m (5' 2\").    Weight as of this encounter: 67.6 kg (149 lb). .    Ifrah Zuniga, [facility]    "

## 2023-01-03 ENCOUNTER — APPOINTMENT (OUTPATIENT)
Dept: OPTOMETRY | Facility: CLINIC | Age: 33
End: 2023-01-03
Payer: COMMERCIAL

## 2023-01-03 ENCOUNTER — OFFICE VISIT (OUTPATIENT)
Dept: OPTOMETRY | Facility: CLINIC | Age: 33
End: 2023-01-03
Payer: COMMERCIAL

## 2023-01-03 DIAGNOSIS — H04.123 DRY EYES: ICD-10-CM

## 2023-01-03 DIAGNOSIS — H52.13 MYOPIA OF BOTH EYES: ICD-10-CM

## 2023-01-03 DIAGNOSIS — Z01.00 ROUTINE EYE EXAM: Primary | ICD-10-CM

## 2023-01-03 PROCEDURE — V2100 LENS SPHER SINGLE PLANO 4.00: HCPCS | Mod: LT | Performed by: OPTOMETRIST

## 2023-01-03 PROCEDURE — V2020 VISION SVCS FRAMES PURCHASES: HCPCS | Performed by: OPTOMETRIST

## 2023-01-03 PROCEDURE — 92015 DETERMINE REFRACTIVE STATE: CPT | Performed by: OPTOMETRIST

## 2023-01-03 PROCEDURE — 92004 COMPRE OPH EXAM NEW PT 1/>: CPT | Performed by: OPTOMETRIST

## 2023-01-03 ASSESSMENT — REFRACTION_MANIFEST
OD_SPHERE: -4.25
OS_AXIS: 139
OS_SPHERE: -4.75
OS_CYLINDER: +0.25
OS_SPHERE: -4.00
OS_CYLINDER: SPHERE
OD_CYLINDER: SPHERE
OD_SPHERE: -4.00

## 2023-01-03 ASSESSMENT — REFRACTION_WEARINGRX
OD_CYLINDER: SPHERE
SPECS_TYPE: SVL
OS_SPHERE: -3.25
OD_SPHERE: -3.00
OS_CYLINDER: SPHERE

## 2023-01-03 ASSESSMENT — CONF VISUAL FIELD
OS_INFERIOR_TEMPORAL_RESTRICTION: 0
OD_SUPERIOR_NASAL_RESTRICTION: 0
OS_SUPERIOR_TEMPORAL_RESTRICTION: 0
OD_NORMAL: 1
OD_INFERIOR_TEMPORAL_RESTRICTION: 0
OS_NORMAL: 1
OS_INFERIOR_NASAL_RESTRICTION: 0
OS_SUPERIOR_NASAL_RESTRICTION: 0
METHOD: COUNTING FINGERS
OD_SUPERIOR_TEMPORAL_RESTRICTION: 0
OD_INFERIOR_NASAL_RESTRICTION: 0

## 2023-01-03 ASSESSMENT — KERATOMETRY
OD_AXISANGLE2_DEGREES: 174
OS_K2POWER_DIOPTERS: 43.50
OS_K1POWER_DIOPTERS: 42.75
OS_AXISANGLE2_DEGREES: 22
OD_K2POWER_DIOPTERS: 43.50
OD_K1POWER_DIOPTERS: 43.00

## 2023-01-03 ASSESSMENT — VISUAL ACUITY
OD_CC: 20/40
OD_CC+: -2
OD_CC: 20/20
METHOD: SNELLEN - LINEAR
OS_PH_CC: 20/30-2
OD_SC: 20/200
CORRECTION_TYPE: GLASSES
OS_SC: 20/200
OS_CC+: -2
OS_CC: 20/40
OS_CC: 20/20
OD_PH_CC: 20/30-1

## 2023-01-03 ASSESSMENT — CUP TO DISC RATIO
OD_RATIO: 0.4
OS_RATIO: 0.4

## 2023-01-03 ASSESSMENT — TONOMETRY
IOP_METHOD: APPLANATION
OS_IOP_MMHG: 16
OD_IOP_MMHG: 16

## 2023-01-03 ASSESSMENT — SLIT LAMP EXAM - LIDS
COMMENTS: NORMAL
COMMENTS: NORMAL

## 2023-01-03 NOTE — PATIENT INSTRUCTIONS
Fill glasses prescription  Allow 2 weeks to adapt to change in glasses  Return in 1 year for eye exam    Carol Mccullough O.D.  [facility]  [address] [place] [address] [phone]

## 2023-01-03 NOTE — PROGRESS NOTES
Chief Complaint   Patient presents with     COMPREHENSIVE EYE EXAM       Using  Service for assistance.    Last Eye Exam: 2018  Dilated Previously: Yes    Wanted contact lenses last time at [facility] -  said eyes are too dry     What are you currently using to see?  Glasses, using an older pair. The more recent ones  broke       Distance Vision Acuity: Noticed gradual change in both eyes, but also using older glasses     Near Vision Acuity: Satisfied with vision while reading and using computer unaided    Eye Comfort: good and dry  Do you use eye drops? : No  Occupation or Hobbies: [employer]           Medical, surgical and family histories reviewed and updated 1/3/2023.       OBJECTIVE: See Ophthalmology exam    ASSESSMENT:    ICD-10-CM    1. Routine eye exam  Z01.00 EYE EXAM (SIMPLE-NONBILLABLE)     REFRACTION      2. Myopia of both eyes  H52.13 EYE EXAM (SIMPLE-NONBILLABLE)     REFRACTION      3. Dry eyes  H04.123 EYE EXAM (SIMPLE-NONBILLABLE)     REFRACTION          PLAN:     Patient Instructions   Fill glasses prescription  Allow 2 weeks to adapt to change in glasses  Return in 1 year for eye exam    Carol Mccullough O.D.  [facility]  [address] [place], [place] [id number] [phone]       I have personally performed a face to face diagnostic evaluation on this patient. I have reviewed the ACP note and agree with the history, exam and plan of care, except as noted.

## 2023-01-03 NOTE — LETTER
1/3/2023         RE: Kyra Torres  [address] [place] [facility] [id number]        Dear Colleague,    Thank you for referring your patient, Kyra Torres, to the [facility]. Please see a copy of my visit note below.    Chief Complaint   Patient presents with     COMPREHENSIVE EYE EXAM       Using  Service for assistance.    Last Eye Exam: 2018  Dilated Previously: Yes    Wanted contact lenses last time at [facility] - dr said eyes are too dry     What are you currently using to see?  Glasses, using an older pair. The more recent ones  broke       Distance Vision Acuity: Noticed gradual change in both eyes, but also using older glasses     Near Vision Acuity: Satisfied with vision while reading and using computer unaided    Eye Comfort: good and dry  Do you use eye drops? : No  Occupation or Hobbies: [employer]           Medical, surgical and family histories reviewed and updated 1/3/2023.       OBJECTIVE: See Ophthalmology exam    ASSESSMENT:    ICD-10-CM    1. Routine eye exam  Z01.00 EYE EXAM (SIMPLE-NONBILLABLE)     REFRACTION      2. Myopia of both eyes  H52.13 EYE EXAM (SIMPLE-NONBILLABLE)     REFRACTION      3. Dry eyes  H04.123 EYE EXAM (SIMPLE-NONBILLABLE)     REFRACTION          PLAN:     Patient Instructions   Fill glasses prescription  Allow 2 weeks to adapt to change in glasses  Return in 1 year for eye exam    Carol Mccullough O.D.  [facility]  [address] [place], [place] [id number] [phone]          Again, thank you for allowing me to participate in the care of your patient.        Sincerely,        Carol Mccullough, OD

## 2023-01-10 ENCOUNTER — APPOINTMENT (OUTPATIENT)
Dept: OPTOMETRY | Facility: CLINIC | Age: 33
End: 2023-01-10
Payer: COMMERCIAL

## 2023-01-10 PROCEDURE — 92340 FIT SPECTACLES MONOFOCAL: CPT | Performed by: OPTOMETRIST

## 2023-02-21 ENCOUNTER — MEDICAL CORRESPONDENCE (OUTPATIENT)
Dept: HEALTH INFORMATION MANAGEMENT | Facility: CLINIC | Age: 33
End: 2023-02-21

## 2023-04-18 ENCOUNTER — MEDICAL CORRESPONDENCE (OUTPATIENT)
Dept: HEALTH INFORMATION MANAGEMENT | Facility: CLINIC | Age: 33
End: 2023-04-18
Payer: COMMERCIAL

## 2025-05-07 NOTE — PROGRESS NOTES
CC: Here for routine prenatal visit @ 27w3d   HPI:  Feeling well; had level 2 sonogram and f/u which were both normal; flu shot and TDAP given today; OGT today as well  Discussed s/s of PTL via .  Next growth scan: 32 weeks    PE: /66 (BP Location: Right arm, Patient Position: Chair, Cuff Size: Adult Regular)   Pulse 101   Temp 96.9  F (36.1  C) (Tympanic)   Resp 18   Wt 72.1 kg (159 lb)   LMP 03/20/2020 (LMP Unknown)   BMI 29.08 kg/m     See OB flowsheet      A:  1. Encounter for supervision of other normal pregnancy in third trimester      2. Need for prophylactic vaccination and inoculation against influenza    - INFLUENZA VACCINE IM > 6 MONTHS VALENT IIV4 [97155]  - Vaccine Administration, Initial [55163]      Routine prenatal care  TDAP, flu vaccine  OCT, CBC today  RTC 3 weeks.      Karishma Curry M.D.     
normal/cranial nerves II-XII intact/sensation intact

## (undated) DEVICE — SPONGE LAP 18X18" 1515

## (undated) DEVICE — BLANKET BAIR HUGGER UPPER BODY 42268

## (undated) DEVICE — GLOVE PROTEXIS W/NEU-THERA 7.5  2D73TE75

## (undated) DEVICE — CATH BALLOON BAKRI POST PARTUM UTERINE 24FRX5CM G24237

## (undated) DEVICE — STOCKING SLEEVE COMPRESSION CALF MED

## (undated) DEVICE — GOWN IMPERVIOUS SPECIALTY XLG/XLONG 32474

## (undated) DEVICE — SOL WATER IRRIG 1000ML BOTTLE 07139-09

## (undated) DEVICE — SU VICRYL 3-0 SH 27" UND J416H

## (undated) DEVICE — GLOVE PROTEXIS W/NEU-THERA 6.5  2D73TE65

## (undated) DEVICE — GLOVE PROTEXIS BLUE W/NEU-THERA 7.5  2D73EB75

## (undated) DEVICE — PAD PERI INDIV WRAP 11" 2022

## (undated) DEVICE — SU PLAIN 3-0 CT 27" 852H

## (undated) DEVICE — BLADE CLIPPER 4406

## (undated) DEVICE — SU VICRYL 2-0 CT-1 36" UND J945H

## (undated) DEVICE — TUBING SUCTION MEDI-VAC 1/4"X20' N620A

## (undated) DEVICE — SU NDL MAYO 1824-4

## (undated) DEVICE — Device

## (undated) DEVICE — GOWN LG DISP 9515

## (undated) DEVICE — SUCTION TIP POOLE K770

## (undated) DEVICE — TUBING VACUUM COLLECTION 6FT 23116

## (undated) DEVICE — TUBING SUCTION 12"X1/4" N612

## (undated) DEVICE — SOL NACL 0.9% IRRIG 1000ML BOTTLE 07138-09

## (undated) DEVICE — GLOVE PROTEXIS POWDER FREE 7.5 ORTHOPEDIC 2D73ET75

## (undated) DEVICE — BLADE KNIFE SURG 15 371115

## (undated) DEVICE — SU VICRYL 4-0 FS-2 27" J422-H

## (undated) DEVICE — SUCTION TIP YANKAUER STR K87

## (undated) DEVICE — BLADE KNIFE SURG 10 371110

## (undated) DEVICE — SU VICRYL 3-0 CT-1 27" UND J258H

## (undated) DEVICE — SU VICRYL 0 CT-1 36" J946H

## (undated) DEVICE — SU VICRYL 0 CTX 36" UND J978H

## (undated) DEVICE — LUBRICATING JELLY 4.25OZ

## (undated) DEVICE — PACK LAPAROSCOPY/PELVISCOPY STD

## (undated) DEVICE — STPL SKIN 35W 6.9MM  PXW35

## (undated) DEVICE — PACK LAPAROTOMY CUSTOM LAKES

## (undated) DEVICE — GLOVE PROTEXIS W/NEU-THERA 7.0  2D73TE70

## (undated) DEVICE — GLOVE PROTEXIS BLUE W/NEU-THERA 7.0  2D73EB70

## (undated) DEVICE — SU CHROMIC 0 BP-1 27" 47T

## (undated) RX ORDER — PROPOFOL 10 MG/ML
INJECTION, EMULSION INTRAVENOUS
Status: DISPENSED
Start: 2020-12-16

## (undated) RX ORDER — FENTANYL CITRATE 50 UG/ML
INJECTION, SOLUTION INTRAMUSCULAR; INTRAVENOUS
Status: DISPENSED
Start: 2020-12-16

## (undated) RX ORDER — CEFAZOLIN SODIUM 1 G/3ML
INJECTION, POWDER, FOR SOLUTION INTRAMUSCULAR; INTRAVENOUS
Status: DISPENSED
Start: 2020-12-16

## (undated) RX ORDER — ONDANSETRON 2 MG/ML
INJECTION INTRAMUSCULAR; INTRAVENOUS
Status: DISPENSED
Start: 2020-12-16

## (undated) RX ORDER — LIDOCAINE HYDROCHLORIDE 10 MG/ML
INJECTION, SOLUTION EPIDURAL; INFILTRATION; INTRACAUDAL; PERINEURAL
Status: DISPENSED
Start: 2020-12-16

## (undated) RX ORDER — DEXAMETHASONE SODIUM PHOSPHATE 4 MG/ML
INJECTION, SOLUTION INTRA-ARTICULAR; INTRALESIONAL; INTRAMUSCULAR; INTRAVENOUS; SOFT TISSUE
Status: DISPENSED
Start: 2020-12-16

## (undated) RX ORDER — OXYTOCIN/0.9 % SODIUM CHLORIDE 30/500 ML
PLASTIC BAG, INJECTION (ML) INTRAVENOUS
Status: DISPENSED
Start: 2020-12-16

## (undated) RX ORDER — MISOPROSTOL 200 UG/1
TABLET ORAL
Status: DISPENSED
Start: 2020-12-16

## (undated) RX ORDER — FENTANYL CITRATE-0.9 % NACL/PF 10 MCG/ML
PLASTIC BAG, INJECTION (ML) INTRAVENOUS
Status: DISPENSED
Start: 2020-12-16